# Patient Record
Sex: FEMALE | Race: WHITE | NOT HISPANIC OR LATINO | URBAN - METROPOLITAN AREA
[De-identification: names, ages, dates, MRNs, and addresses within clinical notes are randomized per-mention and may not be internally consistent; named-entity substitution may affect disease eponyms.]

---

## 2017-01-27 ENCOUNTER — OUTPATIENT (OUTPATIENT)
Dept: OUTPATIENT SERVICES | Facility: HOSPITAL | Age: 77
LOS: 1 days | Discharge: HOME | End: 2017-01-27

## 2017-03-06 ENCOUNTER — APPOINTMENT (OUTPATIENT)
Dept: HEMATOLOGY ONCOLOGY | Facility: CLINIC | Age: 77
End: 2017-03-06

## 2017-03-06 ENCOUNTER — OUTPATIENT (OUTPATIENT)
Dept: OUTPATIENT SERVICES | Facility: HOSPITAL | Age: 77
LOS: 1 days | Discharge: HOME | End: 2017-03-06

## 2017-03-06 VITALS
WEIGHT: 138 LBS | RESPIRATION RATE: 12 BRPM | HEART RATE: 59 BPM | TEMPERATURE: 98.3 F | SYSTOLIC BLOOD PRESSURE: 139 MMHG | DIASTOLIC BLOOD PRESSURE: 64 MMHG

## 2017-03-06 DIAGNOSIS — E04.2 NONTOXIC MULTINODULAR GOITER: ICD-10-CM

## 2017-03-06 DIAGNOSIS — Z87.891 PERSONAL HISTORY OF NICOTINE DEPENDENCE: ICD-10-CM

## 2017-03-06 DIAGNOSIS — Z87.39 PERSONAL HISTORY OF OTHER DISEASES OF THE MUSCULOSKELETAL SYSTEM AND CONNECTIVE TISSUE: ICD-10-CM

## 2017-03-06 LAB
BASOPHILS # BLD: 0.04 TH/MM3
BASOPHILS NFR BLD: 0.8 %
EOSINOPHIL # BLD: 0.17 TH/MM3
EOSINOPHIL NFR BLD: 3.6 %
ERYTHROCYTE [DISTWIDTH] IN BLOOD BY AUTOMATED COUNT: 13.4 %
GRANULOCYTES # BLD: 2.62 TH/MM3
GRANULOCYTES NFR BLD: 54.8 %
HCT VFR BLD AUTO: 34.8 %
HGB BLD-MCNC: 12 G/DL
IMM GRANULOCYTES # BLD: 0.04 TH/MM3
IMM GRANULOCYTES NFR BLD: 0.8 %
LYMPHOCYTES # BLD: 1.42 TH/MM3
LYMPHOCYTES NFR BLD: 29.7 %
MCH RBC QN AUTO: 31.6 PG
MCHC RBC AUTO-ENTMCNC: 34.5 G/DL
MCV RBC AUTO: 91.6 FL
MONOCYTES # BLD: 0.49 TH/MM3
MONOCYTES NFR BLD: 10.3 %
PLATELET # BLD: 195 TH/MM3
PMV BLD AUTO: 9.7 FL
RBC # BLD AUTO: 3.8 MIL/MM3
WBC # BLD: 4.78 TH/MM3

## 2017-06-27 DIAGNOSIS — D64.9 ANEMIA, UNSPECIFIED: ICD-10-CM

## 2017-06-27 DIAGNOSIS — D72.819 DECREASED WHITE BLOOD CELL COUNT, UNSPECIFIED: ICD-10-CM

## 2017-06-28 DIAGNOSIS — D48.5 NEOPLASM OF UNCERTAIN BEHAVIOR OF SKIN: ICD-10-CM

## 2017-10-03 ENCOUNTER — OUTPATIENT (OUTPATIENT)
Dept: OUTPATIENT SERVICES | Facility: HOSPITAL | Age: 77
LOS: 1 days | Discharge: HOME | End: 2017-10-03

## 2017-10-03 DIAGNOSIS — J84.10 PULMONARY FIBROSIS, UNSPECIFIED: ICD-10-CM

## 2017-10-03 DIAGNOSIS — R06.00 DYSPNEA, UNSPECIFIED: ICD-10-CM

## 2017-10-03 DIAGNOSIS — R51 HEADACHE: ICD-10-CM

## 2017-10-03 DIAGNOSIS — J98.4 OTHER DISORDERS OF LUNG: ICD-10-CM

## 2017-10-03 DIAGNOSIS — I48.91 UNSPECIFIED ATRIAL FIBRILLATION: ICD-10-CM

## 2017-11-28 ENCOUNTER — OUTPATIENT (OUTPATIENT)
Dept: OUTPATIENT SERVICES | Facility: HOSPITAL | Age: 77
LOS: 1 days | Discharge: HOME | End: 2017-11-28

## 2017-11-28 DIAGNOSIS — R51 HEADACHE: ICD-10-CM

## 2017-11-28 DIAGNOSIS — I48.91 UNSPECIFIED ATRIAL FIBRILLATION: ICD-10-CM

## 2017-11-30 DIAGNOSIS — Z87.310 PERSONAL HISTORY OF (HEALED) OSTEOPOROSIS FRACTURE: ICD-10-CM

## 2017-11-30 DIAGNOSIS — Z13.820 ENCOUNTER FOR SCREENING FOR OSTEOPOROSIS: ICD-10-CM

## 2017-11-30 DIAGNOSIS — M89.9 DISORDER OF BONE, UNSPECIFIED: ICD-10-CM

## 2017-11-30 DIAGNOSIS — Z82.62 FAMILY HISTORY OF OSTEOPOROSIS: ICD-10-CM

## 2017-11-30 DIAGNOSIS — Z78.0 ASYMPTOMATIC MENOPAUSAL STATE: ICD-10-CM

## 2018-03-12 ENCOUNTER — OUTPATIENT (OUTPATIENT)
Dept: OUTPATIENT SERVICES | Facility: HOSPITAL | Age: 78
LOS: 1 days | Discharge: HOME | End: 2018-03-12

## 2018-03-12 ENCOUNTER — APPOINTMENT (OUTPATIENT)
Dept: HEMATOLOGY ONCOLOGY | Facility: CLINIC | Age: 78
End: 2018-03-12

## 2018-03-12 ENCOUNTER — LABORATORY RESULT (OUTPATIENT)
Age: 78
End: 2018-03-12

## 2018-03-12 VITALS
SYSTOLIC BLOOD PRESSURE: 136 MMHG | WEIGHT: 130 LBS | HEART RATE: 67 BPM | DIASTOLIC BLOOD PRESSURE: 62 MMHG | HEIGHT: 62 IN | BODY MASS INDEX: 23.92 KG/M2 | RESPIRATION RATE: 14 BRPM | TEMPERATURE: 98.3 F

## 2018-03-12 LAB
HCT VFR BLD CALC: 36.2 %
HGB BLD-MCNC: 12.2 G/DL
MCHC RBC-ENTMCNC: 31 PG
MCHC RBC-ENTMCNC: 33.7 G/DL
MCV RBC AUTO: 92.1 FL
PLATELET # BLD AUTO: 196 K/UL
PMV BLD: 10.2 FL
RBC # BLD: 3.93 M/UL
RBC # FLD: 13.2 %
WBC # FLD AUTO: 5.01 K/UL

## 2018-03-12 RX ORDER — RIVAROXABAN 15 MG/1
15 TABLET, FILM COATED ORAL
Refills: 0 | Status: DISCONTINUED | COMMUNITY
End: 2018-03-12

## 2018-03-15 DIAGNOSIS — D72.819 DECREASED WHITE BLOOD CELL COUNT, UNSPECIFIED: ICD-10-CM

## 2018-10-10 ENCOUNTER — OUTPATIENT (OUTPATIENT)
Dept: OUTPATIENT SERVICES | Facility: HOSPITAL | Age: 78
LOS: 1 days | Discharge: HOME | End: 2018-10-10

## 2018-10-10 DIAGNOSIS — R91.8 OTHER NONSPECIFIC ABNORMAL FINDING OF LUNG FIELD: ICD-10-CM

## 2019-01-17 ENCOUNTER — TRANSCRIPTION ENCOUNTER (OUTPATIENT)
Age: 79
End: 2019-01-17

## 2019-01-22 ENCOUNTER — TRANSCRIPTION ENCOUNTER (OUTPATIENT)
Age: 79
End: 2019-01-22

## 2019-01-23 ENCOUNTER — EMERGENCY (EMERGENCY)
Facility: HOSPITAL | Age: 79
LOS: 0 days | Discharge: HOME | End: 2019-01-23
Attending: EMERGENCY MEDICINE

## 2019-01-23 VITALS
OXYGEN SATURATION: 98 % | SYSTOLIC BLOOD PRESSURE: 135 MMHG | RESPIRATION RATE: 18 BRPM | HEART RATE: 80 BPM | TEMPERATURE: 99 F | DIASTOLIC BLOOD PRESSURE: 65 MMHG

## 2019-01-23 VITALS
RESPIRATION RATE: 18 BRPM | DIASTOLIC BLOOD PRESSURE: 77 MMHG | HEART RATE: 80 BPM | OXYGEN SATURATION: 98 % | TEMPERATURE: 96 F | SYSTOLIC BLOOD PRESSURE: 163 MMHG

## 2019-01-23 DIAGNOSIS — Z02.9 ENCOUNTER FOR ADMINISTRATIVE EXAMINATIONS, UNSPECIFIED: ICD-10-CM

## 2019-01-23 LAB
ALBUMIN SERPL ELPH-MCNC: 3.8 G/DL — SIGNIFICANT CHANGE UP (ref 3.5–5.2)
ALP SERPL-CCNC: 81 U/L — SIGNIFICANT CHANGE UP (ref 30–115)
ALT FLD-CCNC: 18 U/L — SIGNIFICANT CHANGE UP (ref 0–41)
APPEARANCE UR: CLEAR — SIGNIFICANT CHANGE UP
AST SERPL-CCNC: 30 U/L — SIGNIFICANT CHANGE UP (ref 0–41)
BASOPHILS # BLD AUTO: 0.05 K/UL — SIGNIFICANT CHANGE UP (ref 0–0.2)
BASOPHILS NFR BLD AUTO: 1 % — SIGNIFICANT CHANGE UP (ref 0–1)
BILIRUB SERPL-MCNC: 0.3 MG/DL — SIGNIFICANT CHANGE UP (ref 0.2–1.2)
BILIRUB UR-MCNC: NEGATIVE — SIGNIFICANT CHANGE UP
BUN SERPL-MCNC: 7 MG/DL — LOW (ref 10–20)
CALCIUM SERPL-MCNC: 9 MG/DL — SIGNIFICANT CHANGE UP (ref 8.5–10.1)
CHLORIDE SERPL-SCNC: 93 MMOL/L — LOW (ref 98–110)
CO2 SERPL-SCNC: 25 MMOL/L — SIGNIFICANT CHANGE UP (ref 17–32)
COLOR SPEC: YELLOW — SIGNIFICANT CHANGE UP
CREAT SERPL-MCNC: 0.5 MG/DL — LOW (ref 0.7–1.5)
DIFF PNL FLD: ABNORMAL
EOSINOPHIL # BLD AUTO: 0.12 K/UL — SIGNIFICANT CHANGE UP (ref 0–0.7)
EOSINOPHIL NFR BLD AUTO: 2.3 % — SIGNIFICANT CHANGE UP (ref 0–8)
GLUCOSE SERPL-MCNC: 94 MG/DL — SIGNIFICANT CHANGE UP (ref 70–99)
GLUCOSE UR QL: NEGATIVE MG/DL — SIGNIFICANT CHANGE UP
HCT VFR BLD CALC: 33.7 % — LOW (ref 37–47)
HGB BLD-MCNC: 11.7 G/DL — LOW (ref 12–16)
IMM GRANULOCYTES NFR BLD AUTO: 0.6 % — HIGH (ref 0.1–0.3)
KETONES UR-MCNC: 15
LEUKOCYTE ESTERASE UR-ACNC: NEGATIVE — SIGNIFICANT CHANGE UP
LYMPHOCYTES # BLD AUTO: 1.25 K/UL — SIGNIFICANT CHANGE UP (ref 1.2–3.4)
LYMPHOCYTES # BLD AUTO: 24.3 % — SIGNIFICANT CHANGE UP (ref 20.5–51.1)
MCHC RBC-ENTMCNC: 31.2 PG — HIGH (ref 27–31)
MCHC RBC-ENTMCNC: 34.7 G/DL — SIGNIFICANT CHANGE UP (ref 32–37)
MCV RBC AUTO: 89.9 FL — SIGNIFICANT CHANGE UP (ref 81–99)
MONOCYTES # BLD AUTO: 0.48 K/UL — SIGNIFICANT CHANGE UP (ref 0.1–0.6)
MONOCYTES NFR BLD AUTO: 9.3 % — SIGNIFICANT CHANGE UP (ref 1.7–9.3)
NEUTROPHILS # BLD AUTO: 3.22 K/UL — SIGNIFICANT CHANGE UP (ref 1.4–6.5)
NEUTROPHILS NFR BLD AUTO: 62.5 % — SIGNIFICANT CHANGE UP (ref 42.2–75.2)
NITRITE UR-MCNC: NEGATIVE — SIGNIFICANT CHANGE UP
NRBC # BLD: 0 /100 WBCS — SIGNIFICANT CHANGE UP (ref 0–0)
PH UR: 7 — SIGNIFICANT CHANGE UP (ref 5–8)
PLATELET # BLD AUTO: 318 K/UL — SIGNIFICANT CHANGE UP (ref 130–400)
POTASSIUM SERPL-MCNC: 5.2 MMOL/L — HIGH (ref 3.5–5)
POTASSIUM SERPL-SCNC: 5.2 MMOL/L — HIGH (ref 3.5–5)
PROT SERPL-MCNC: 7 G/DL — SIGNIFICANT CHANGE UP (ref 6–8)
PROT UR-MCNC: NEGATIVE MG/DL — SIGNIFICANT CHANGE UP
RBC # BLD: 3.75 M/UL — LOW (ref 4.2–5.4)
RBC # FLD: 12.9 % — SIGNIFICANT CHANGE UP (ref 11.5–14.5)
RBC CASTS # UR COMP ASSIST: ABNORMAL /HPF
SODIUM SERPL-SCNC: 134 MMOL/L — LOW (ref 135–146)
SP GR SPEC: 1.01 — SIGNIFICANT CHANGE UP (ref 1.01–1.03)
UROBILINOGEN FLD QL: 0.2 MG/DL — SIGNIFICANT CHANGE UP (ref 0.2–0.2)
WBC # BLD: 5.15 K/UL — SIGNIFICANT CHANGE UP (ref 4.8–10.8)
WBC # FLD AUTO: 5.15 K/UL — SIGNIFICANT CHANGE UP (ref 4.8–10.8)

## 2019-01-23 RX ORDER — METOCLOPRAMIDE HCL 10 MG
10 TABLET ORAL ONCE
Qty: 0 | Refills: 0 | Status: COMPLETED | OUTPATIENT
Start: 2019-01-23 | End: 2019-01-23

## 2019-01-23 RX ORDER — ACETAMINOPHEN WITH CODEINE 300MG-30MG
975 TABLET ORAL ONCE
Qty: 0 | Refills: 0 | Status: DISCONTINUED | OUTPATIENT
Start: 2019-01-23 | End: 2019-01-23

## 2019-01-23 NOTE — ED PROVIDER NOTE - NS ED ROS FT
Review of Systems    Constitutional: (+) fever  Eyes/ENT: (-) change in vision, (-) sore throat, (-) ear pain  Cardiovascular: (-) chest pain, (-) palpitation   Respiratory: (-) cough, (-) shortness of breath  Gastrointestinal: (-) abdominal pain (-) vomiting  Musculoskeletal: (-) neck pain, (-) back pain, (-) joint pain  Integumentary: (-) rash, (-) edema  Neurological: (-) altered mental status  Heme/Lymph: (-) easy bruising (-) easy bleeding

## 2019-01-23 NOTE — ED ADULT NURSE NOTE - OBJECTIVE STATEMENT
PT C/O OF HEAD FULLNESS, COUGH AND TMAX 101. PT A&OX4. PT REUSES PAIN MEDS AND NAUSEA MEDS AT THIS TIME. PT IN NO ACUTE DISTRESS.

## 2019-01-23 NOTE — ED PROVIDER NOTE - OBJECTIVE STATEMENT
79 yo f w a h/o afib, htn p/w headache for 17 d in duration that was proceeded by URI with cough, runny nose and stuffiness, pt was evaluated by PMD and proscribed doxycycline for 10 d for rhinosinusitis currently on 6th d of therapy. Pt reports that headache is mild loclised to the frontotemporal region and non radiating; relieved with Tylenol 975 mg, no positional, with no aggravating factors. Additionally pt is c/o ongoing subjective tactile fevers. The headache is not associated with vision changes, focal deficits, ams, loc, numbness, cough, sob or cp.

## 2019-01-23 NOTE — ED PROVIDER NOTE - PHYSICAL EXAMINATION
Physical Exam    Vital Signs: I have reviewed the initial vital signs.  Constitutional: well-nourished, appears stated age, no acute distress  Eyes: PERRLA, EOM intact, RAPD absent, conjunctiva clear and symmetrical lids.  ENT: neck supple with no adenopathy, moist MM, no rhinorrhea, +ttp over the frontal sinuses b/ L>R. no ant adenopathy or tonsillar erythema or exudates.  Cardiovascular: +S1/S2, no murmurs, regular rate, regular rhythm, well-perfused extremities  Respiratory: unlabored respiratory effort, clear to auscultation bilaterally, speaks in full sentences  Gastrointestinal: soft, non-tender abdomen, no pulsatile mass  Neurologic: awake, alert, cranial nerves II-XII grossly intact, extremities’ motor and sensory functions grossly intact

## 2019-01-23 NOTE — ED PROVIDER NOTE - ATTENDING CONTRIBUTION TO CARE
79 yo f c/o right frontal ha pain "not a headache" per pt.  going on for over  weeks, assoc with fever, congestion.  dx with sinusitis outpt urgent clinicl and given nasal spray and abx. has several days left of the 10 day course. was told today her K was high in the outpt blood work she had. she is denying any cp, sob, paolps, dizzy, n, v.  no uti sx. mild cough.  pt in nad, tender frontal sunus. otherwise nml ent. neck sup, no mening. no brud, kern. ctab, rrr, ab soft, nt ,nd . no lad. no rash. non focal.   imaaging, labs, reassess.

## 2019-01-23 NOTE — ED PROVIDER NOTE - PROGRESS NOTE DETAILS
Pt reports feels much better ready for discharge. Neurologic: awake, alert, cranial nerves II-XII grossly intact, extremities’ motor and sensory functions grossly intact Patient to be discharged from ED. Any available test results were discussed with patient and/or family. Verbal instructions given, including instructions to return to ED immediately for any new, worsening, or concerning symptoms. Patient endorsed understanding. Written discharge instructions additionally given, including follow-up plan.

## 2019-01-23 NOTE — ED PROVIDER NOTE - NSFOLLOWUPINSTRUCTIONS_ED_ALL_ED_FT
Viral Respiratory Infection    A viral respiratory infection is an illness that affects parts of the body used for breathing, like the lungs, nose, and throat. It is caused by a germ called a virus. Symptoms can include runny nose, coughing, sneezing, fatigue, body aches, sore throat, fever, or headache. Over the counter medicine can be used to manage the symptoms but the infection itself goes away on its own.     SEEK IMMEDIATE MEDICAL CARE IF YOU HAVE THE FOLLOWING SYMPTOMS: shortness of breath, chest pain, fever over 10 days, lightheadedness/dizziness.      FOLLOW UP WITH PMD

## 2019-01-25 ENCOUNTER — INPATIENT (INPATIENT)
Facility: HOSPITAL | Age: 79
LOS: 4 days | Discharge: HOME | End: 2019-01-30
Attending: INTERNAL MEDICINE | Admitting: INTERNAL MEDICINE
Payer: MEDICARE

## 2019-01-25 VITALS
DIASTOLIC BLOOD PRESSURE: 68 MMHG | HEIGHT: 62 IN | TEMPERATURE: 99 F | SYSTOLIC BLOOD PRESSURE: 143 MMHG | WEIGHT: 134.92 LBS | RESPIRATION RATE: 18 BRPM | OXYGEN SATURATION: 98 % | HEART RATE: 84 BPM

## 2019-01-25 DIAGNOSIS — R51 HEADACHE: ICD-10-CM

## 2019-01-25 DIAGNOSIS — I48.91 UNSPECIFIED ATRIAL FIBRILLATION: ICD-10-CM

## 2019-01-25 DIAGNOSIS — I10 ESSENTIAL (PRIMARY) HYPERTENSION: ICD-10-CM

## 2019-01-25 DIAGNOSIS — R91.8 OTHER NONSPECIFIC ABNORMAL FINDING OF LUNG FIELD: ICD-10-CM

## 2019-01-25 LAB
ALBUMIN SERPL ELPH-MCNC: 3.8 G/DL — SIGNIFICANT CHANGE UP (ref 3.5–5.2)
ALP SERPL-CCNC: 78 U/L — SIGNIFICANT CHANGE UP (ref 30–115)
ALT FLD-CCNC: 18 U/L — SIGNIFICANT CHANGE UP (ref 0–41)
ANION GAP SERPL CALC-SCNC: 11 MMOL/L — SIGNIFICANT CHANGE UP (ref 7–14)
APPEARANCE UR: CLEAR — SIGNIFICANT CHANGE UP
APTT BLD: 30.5 SEC — SIGNIFICANT CHANGE UP (ref 27–39.2)
AST SERPL-CCNC: 32 U/L — SIGNIFICANT CHANGE UP (ref 0–41)
BACTERIA # UR AUTO: ABNORMAL
BASOPHILS # BLD AUTO: 0.04 K/UL — SIGNIFICANT CHANGE UP (ref 0–0.2)
BASOPHILS NFR BLD AUTO: 0.8 % — SIGNIFICANT CHANGE UP (ref 0–1)
BILIRUB SERPL-MCNC: 0.2 MG/DL — SIGNIFICANT CHANGE UP (ref 0.2–1.2)
BILIRUB UR-MCNC: NEGATIVE — SIGNIFICANT CHANGE UP
BUN SERPL-MCNC: 14 MG/DL — SIGNIFICANT CHANGE UP (ref 10–20)
CALCIUM SERPL-MCNC: 9.4 MG/DL — SIGNIFICANT CHANGE UP (ref 8.5–10.1)
CHLORIDE SERPL-SCNC: 93 MMOL/L — LOW (ref 98–110)
CO2 SERPL-SCNC: 29 MMOL/L — SIGNIFICANT CHANGE UP (ref 17–32)
COD CRY URNS QL: NEGATIVE — SIGNIFICANT CHANGE UP
COLOR SPEC: YELLOW — SIGNIFICANT CHANGE UP
CREAT SERPL-MCNC: 0.6 MG/DL — LOW (ref 0.7–1.5)
DIFF PNL FLD: ABNORMAL
EOSINOPHIL # BLD AUTO: 0.09 K/UL — SIGNIFICANT CHANGE UP (ref 0–0.7)
EOSINOPHIL NFR BLD AUTO: 1.8 % — SIGNIFICANT CHANGE UP (ref 0–8)
EPI CELLS # UR: ABNORMAL /HPF
ERYTHROCYTE [SEDIMENTATION RATE] IN BLOOD: 110 MM/HR — HIGH (ref 0–20)
GLUCOSE SERPL-MCNC: 102 MG/DL — HIGH (ref 70–99)
GLUCOSE UR QL: NEGATIVE MG/DL — SIGNIFICANT CHANGE UP
GRAN CASTS # UR COMP ASSIST: NEGATIVE — SIGNIFICANT CHANGE UP
HCT VFR BLD CALC: 33.6 % — LOW (ref 37–47)
HGB BLD-MCNC: 11.3 G/DL — LOW (ref 12–16)
HYALINE CASTS # UR AUTO: NEGATIVE — SIGNIFICANT CHANGE UP
IMM GRANULOCYTES NFR BLD AUTO: 0.4 % — HIGH (ref 0.1–0.3)
INR BLD: 1.3 RATIO — SIGNIFICANT CHANGE UP (ref 0.65–1.3)
KETONES UR-MCNC: NEGATIVE — SIGNIFICANT CHANGE UP
LACTATE SERPL-SCNC: 0.8 MMOL/L — SIGNIFICANT CHANGE UP (ref 0.5–2.2)
LEUKOCYTE ESTERASE UR-ACNC: NEGATIVE — SIGNIFICANT CHANGE UP
LYMPHOCYTES # BLD AUTO: 1.11 K/UL — LOW (ref 1.2–3.4)
LYMPHOCYTES # BLD AUTO: 22.4 % — SIGNIFICANT CHANGE UP (ref 20.5–51.1)
MCHC RBC-ENTMCNC: 30.1 PG — SIGNIFICANT CHANGE UP (ref 27–31)
MCHC RBC-ENTMCNC: 33.6 G/DL — SIGNIFICANT CHANGE UP (ref 32–37)
MCV RBC AUTO: 89.6 FL — SIGNIFICANT CHANGE UP (ref 81–99)
MONOCYTES # BLD AUTO: 0.59 K/UL — SIGNIFICANT CHANGE UP (ref 0.1–0.6)
MONOCYTES NFR BLD AUTO: 11.9 % — HIGH (ref 1.7–9.3)
NEUTROPHILS # BLD AUTO: 3.11 K/UL — SIGNIFICANT CHANGE UP (ref 1.4–6.5)
NEUTROPHILS NFR BLD AUTO: 62.7 % — SIGNIFICANT CHANGE UP (ref 42.2–75.2)
NITRITE UR-MCNC: NEGATIVE — SIGNIFICANT CHANGE UP
NRBC # BLD: 0 /100 WBCS — SIGNIFICANT CHANGE UP (ref 0–0)
PH UR: 7 — SIGNIFICANT CHANGE UP (ref 5–8)
PLATELET # BLD AUTO: 298 K/UL — SIGNIFICANT CHANGE UP (ref 130–400)
POTASSIUM SERPL-MCNC: 4.7 MMOL/L — SIGNIFICANT CHANGE UP (ref 3.5–5)
POTASSIUM SERPL-SCNC: 4.7 MMOL/L — SIGNIFICANT CHANGE UP (ref 3.5–5)
PROT SERPL-MCNC: 7.4 G/DL — SIGNIFICANT CHANGE UP (ref 6–8)
PROT UR-MCNC: NEGATIVE MG/DL — SIGNIFICANT CHANGE UP
PROTHROM AB SERPL-ACNC: 14 SEC — HIGH (ref 9.95–12.87)
RBC # BLD: 3.75 M/UL — LOW (ref 4.2–5.4)
RBC # FLD: 12.6 % — SIGNIFICANT CHANGE UP (ref 11.5–14.5)
RBC CASTS # UR COMP ASSIST: ABNORMAL /HPF
SODIUM SERPL-SCNC: 133 MMOL/L — LOW (ref 135–146)
SP GR SPEC: 1.01 — SIGNIFICANT CHANGE UP (ref 1.01–1.03)
TRI-PHOS CRY UR QL COMP ASSIST: NEGATIVE — SIGNIFICANT CHANGE UP
URATE CRY FLD QL MICRO: NEGATIVE — SIGNIFICANT CHANGE UP
UROBILINOGEN FLD QL: 0.2 MG/DL — SIGNIFICANT CHANGE UP (ref 0.2–0.2)
WBC # BLD: 4.96 K/UL — SIGNIFICANT CHANGE UP (ref 4.8–10.8)
WBC # FLD AUTO: 4.96 K/UL — SIGNIFICANT CHANGE UP (ref 4.8–10.8)
WBC UR QL: SIGNIFICANT CHANGE UP /HPF

## 2019-01-25 PROCEDURE — 99222 1ST HOSP IP/OBS MODERATE 55: CPT

## 2019-01-25 RX ORDER — ACETAMINOPHEN 500 MG
650 TABLET ORAL EVERY 6 HOURS
Qty: 0 | Refills: 0 | Status: DISCONTINUED | OUTPATIENT
Start: 2019-01-25 | End: 2019-01-29

## 2019-01-25 RX ORDER — SOTALOL HCL 120 MG
80 TABLET ORAL
Qty: 0 | Refills: 0 | Status: DISCONTINUED | OUTPATIENT
Start: 2019-01-25 | End: 2019-01-29

## 2019-01-25 RX ORDER — CHLORHEXIDINE GLUCONATE 213 G/1000ML
1 SOLUTION TOPICAL
Qty: 0 | Refills: 0 | Status: DISCONTINUED | OUTPATIENT
Start: 2019-01-25 | End: 2019-01-29

## 2019-01-25 RX ORDER — SODIUM CHLORIDE 9 MG/ML
1900 INJECTION, SOLUTION INTRAVENOUS ONCE
Qty: 0 | Refills: 0 | Status: COMPLETED | OUTPATIENT
Start: 2019-01-25 | End: 2019-01-25

## 2019-01-25 RX ADMIN — SODIUM CHLORIDE 1900 MILLILITER(S): 9 INJECTION, SOLUTION INTRAVENOUS at 16:01

## 2019-01-25 RX ADMIN — Medication 60 MILLIGRAM(S): at 21:16

## 2019-01-25 NOTE — H&P ADULT - NSHPLABSRESULTS_GEN_ALL_CORE
< from: Xray Chest 2 Views PA/Lat (19 @ 15:52) >      EXAM:  XR CHEST PA LAT 2V            PROCEDURE DATE:  2019              < end of copied text >    < from: Xray Chest 2 Views PA/Lat (19 @ 15:52) >      Impression:      Right upper lobe opacities, stable.. Right upper lobe nodular opacity   better seen on the current examination.       Findings were reported to Milan Everett on 2019 at 4:05 PM with   readback.          < end of copied text >                          11.3   4.96  )-----------( 298      ( 2019 16:00 )             33.6         133<L>  |  93<L>  |  14  ----------------------------<  102<H>  4.7   |  29  |  0.6<L>    Ca    9.4      2019 16:00    TPro  7.4  /  Alb  3.8  /  TBili  0.2  /  DBili  x   /  AST  32  /  ALT  18  /  AlkPhos  78            Urinalysis Basic - ( 2019 16:13 )    Color: Yellow / Appearance: Clear / S.010 / pH: x  Gluc: x / Ketone: Negative  / Bili: Negative / Urobili: 0.2 mg/dL   Blood: x / Protein: Negative mg/dL / Nitrite: Negative   Leuk Esterase: Negative / RBC: 6-10 /HPF / WBC 3-5 /HPF   Sq Epi: x / Non Sq Epi: Occasional /HPF / Bacteria: Few      PT/INR - ( 2019 16:00 )   PT: 14.00 sec;   INR: 1.30 ratio         PTT - ( 2019 16:00 )  PTT:30.5 sec  Lactate Trend   @ 16:00 Lactate:0.8         CAPILLARY BLOOD GLUCOSE < from: Xray Chest 2 Views PA/Lat (19 @ 15:52) >      EXAM:  XR CHEST PA LAT 2V            PROCEDURE DATE:  2019              < end of copied text >    < from: Xray Chest 2 Views PA/Lat (19 @ 15:52) >      Impression:      Right upper lobe opacities, stable.. Right upper lobe nodular opacity   better seen on the current examination.       Findings were reported to Milan Everett on 2019 at 4:05 PM with   readback.          < end of copied text >                          11.3   4.96  )-----------( 298      ( 2019 16:00 )             33.6         133<L>  |  93<L>  |  14  ----------------------------<  102<H>  4.7   |  29  |  0.6<L>    Ca    9.4      2019 16:00    TPro  7.4  /  Alb  3.8  /  TBili  0.2  /  DBili  x   /  AST  32  /  ALT  18  /  AlkPhos  78            Urinalysis Basic - ( 2019 16:13 )    Color: Yellow / Appearance: Clear / S.010 / pH: x  Gluc: x / Ketone: Negative  / Bili: Negative / Urobili: 0.2 mg/dL   Blood: x / Protein: Negative mg/dL / Nitrite: Negative   Leuk Esterase: Negative / RBC: 6-10 /HPF / WBC 3-5 /HPF   Sq Epi: x / Non Sq Epi: Occasional /HPF / Bacteria: Few      PT/INR - ( 2019 16:00 )   PT: 14.00 sec;   INR: 1.30 ratio         PTT - ( 2019 16:00 )  PTT:30.5 sec  Lactate Trend   @ 16:00 Lactate:0.8         CAPILLARY BLOOD GLUCOSE    ESR - 110

## 2019-01-25 NOTE — ED ADULT NURSE NOTE - NSIMPLEMENTINTERV_GEN_ALL_ED
Implemented All Universal Safety Interventions:  [place] to call system. Call bell, personal items and telephone within reach. Instruct patient to call for assistance. Room bathroom lighting operational. Non-slip footwear when patient is off stretcher. Physically safe environment: no spills, clutter or unnecessary equipment. Stretcher in lowest position, wheels locked, appropriate side rails in place.

## 2019-01-25 NOTE — ED ADULT TRIAGE NOTE - CHIEF COMPLAINT QUOTE
pt states she has had a fever for 19 days and is presently being treated for a sinus infection with antibiotics, has been seen in the ED and was called back because ofchest xray results

## 2019-01-25 NOTE — CONSULT NOTE ADULT - PROBLEM SELECTOR RECOMMENDATION 9
f/u with neurology  will f/u patient on the floor to r/o temporal arteritis f/u with neurology  will f/u patient on the floor to r/o temporal arteritis   no acute surgical intervention  case d/w attemding: will try to schedule case at [facility] on Monday or Tuesday  will keep you posted f/u with neurology  will f/u patient on the floor to r/o temporal arteritis     case d/w attemding: will try to schedule case at [facility] on Monday or Tuesday  will keep you posted

## 2019-01-25 NOTE — INPATIENT CERTIFICATION FOR MEDICARE PATIENTS - RISKS OF ADVERSE EVENTS
Concern for worsening infectious process/Concern for delay in diagnosis and treatment/Concern for neurologic deterioration

## 2019-01-25 NOTE — ED PROVIDER NOTE - NS ED ROS FT
Review of Systems    Constitutional: (+) fever  Eyes/ENT: (-) change in vision, (-) sore throat, (-) ear pain  Cardiovascular: (-) chest pain, (-) palpitation   Respiratory: (-) shortness of breath  Gastrointestinal: (-) abdominal pain (-) vomiting  Musculoskeletal: (-) neck pain, (-) back pain, (-) joint pain  Integumentary: (-) rash, (-) edema  Neurological: (-) altered mental status

## 2019-01-25 NOTE — ED PROVIDER NOTE - PROGRESS NOTE DETAILS
case d/w dr. mejía.  dx testing d/w him.  he requests we addd on ESR and he will perform quantiferon test on Monday in the office.  he will facilitate the patient's f/u with dr. clement. Pt discuessed with Dr Pretty Neurology recommends admission for temporal artery biopsy Case discussed with Dr Stone aware of R upper lobe opacity R/o TB and r/o temporal arteritis case d/w dr. mejía.  dx testing d/w him.  he requests we addd on ESR Case discussed with Dr Stone aware of R upper lobe opacity- will need pulmonary consult. Admitted for suspected temporal arteritis per neurology

## 2019-01-25 NOTE — ED PROVIDER NOTE - ATTENDING CONTRIBUTION TO CARE
pt with persisting low grade fever.  no cough.  + right sided facial pain.  sx present for over 1 week.  little or no change with po abx.  + sinus drip.  VS noted.  ttp over temporal artery, but also over frontal and maxillary sinus.  neck supple.  chest clear.  dx testing reviewed from this and previous visit.  opacity seen on CXR is subtle and is not c/w significant bacterial infection.  WBC, VS support notion this is not c/w sepsis.  diff dx d/w pt.  in my opinion, no acute medical or surgical emergency exists at this time and In my opinion, out patient treatment and follow up is appropriate. await ESR. Copies of all diagnostic studies were given to patient to aid in follow up.

## 2019-01-25 NOTE — CONSULT NOTE ADULT - ATTENDING COMMENTS
Left temporal headache with . Neurology requesting TA biopsy. Will plan for 01/29/19. Hold all anticoagulants.

## 2019-01-25 NOTE — ED PROVIDER NOTE - OBJECTIVE STATEMENT
79 yo f pmhx sig for afib and htn, currently being trx for sinusitis was seen in the ED 2 d ago for cough and left sided headache, on the CXR it was found pt had an opacity in the R upper lobe. Additionally pt was seen by PMD told to follow up in the ED to r/o temporal arteritis ESR test. Currently pt is c/o ongoing episodic fevers with T max of 100.1. Additionally pt is reporting a episodic L sided headache that is non radiating not associated with n/v, photophobia; relieved with Tylenol 975, no aggravating factors. Denies vision changes, LOC, CP, cough, SOB, night sweats and weight loss.    I have reviewed available current nursing and previous documentation of past medical, surgical, family, and/or social history.

## 2019-01-25 NOTE — ED PROVIDER NOTE - MEDICAL DECISION MAKING DETAILS
ESR elevated. Dr. Pretty from neurology recommends admission and starting patient on prednisone for possible giant cell arteritis. Patient will also need pulm consult in house for upper lobe opacity. Dr. Stone aware of the above and assumed the care of the patient.

## 2019-01-25 NOTE — H&P ADULT - HISTORY OF PRESENT ILLNESS
[age]yo female, recently started on oral doxycycline for sinusitis but subsequently found to have RUL opacity on CXR is referred to the ER due to episodic fevers and left sided headache in addition to above. Patient tells me she gets night sweats along with stiff neck present only in hte morning. Denies weight loss but she also has a non-productive cough 77yo female, recently started on oral doxycycline for sinusitis but subsequently found to have RUL opacity on CXR is referred to the ER due to episodic fevers and left sided headache in addition to above. Patient tells me she gets night sweats along with stiff neck present only in hte morning. Denies weight loss but she also has a non-productive cough. Fevers are controlled with Tylenol 77yo female, recently started on oral doxycycline for sinusitis but subsequently found to have RUL opacity on CXR is referred to the ER due to episodic fevers up to 101.1 for 19 days,  and left sided headache in addition to above. Patient tells me she gets night sweats along with stiff neck present only in the morning. Denies weight loss but she also has a non-productive cough. Fevers are controlled with Tylenol

## 2019-01-25 NOTE — ED PROVIDER NOTE - PHYSICAL EXAMINATION
Physical Exam    Vital Signs: I have reviewed the initial vital signs.  Constitutional: well-nourished, appears stated age, no acute distress  Eyes: PERRLA, and symmetrical lids.  ENT: Neck supple with no adenopathy, moist MM. +TTP over the fronto L temporal region and temporal artery.   Cardiovascular: regular rate, regular rhythm, well-perfused extremities  Respiratory: unlabored respiratory effort, clear to auscultation bilaterally  Gastrointestinal: soft, non-tender abdomen  Musculoskeletal: supple neck, no lower extremity edema, no neck stiffness  Integumentary: warm, dry, no rash  Neurologic: awake, alert, cranial nerves II-XII grossly intact, extremities’ motor and sensory functions grossly intact  Psychiatric: A&Ox3

## 2019-01-25 NOTE — H&P ADULT - PROBLEM SELECTOR PLAN 1
Concern for TA in view of elevated ESR (though she also has fevers). Continue prednisone started in the ER with consults to neurology and vascular surgery (WILL HOLD XARELTO PENDING ABOVE INPUT IN CASE BIOPSY IS NEEDED)

## 2019-01-25 NOTE — CONSULT NOTE ADULT - SUBJECTIVE AND OBJECTIVE BOX
· asked to evaluate This patient is a 78y Female complaining of medical evaluation.	  · HPI Objective Statement: 77 yo f pmhx sig for afib and htn, currently being trx for sinusitis was seen in the ED 2 d ago for cough and left sided headache, on the CXR it was found pt had an opacity in the R upper lobe. Additionally pt was seen by PMD told to follow up in the ED to r/o temporal arteritis ESR test. Currently pt is c/o ongoing episodic fevers with T max of 100.1. Additionally pt is reporting a episodic L sided headache that is non radiating not associated with n/v, photophobia; relieved with Tylenol 975, no aggravating factors. Denies vision changes, LOC, CP, cough, SOB, night sweats and weight loss.    This is a 78y patient admitted for  neurologic temporal pain on left side   called to evaluate pt     Pmhx: afib, htn Psurghx Allergy: amoxicillin (Unknown) aspirin (Other (Severe)) cephalexin (Unknown) Cipro (Unknown) naproxen (Unknown) penicillin V potassium (Unknown) sulfonamides (Unknown)  Meds: chlorhexidine 4% Liquid 1 Application(s) Topical <User Schedule>  SH:    Vital Signs Last 24 Hrs T(C): 37.4 (25 Jan 2019 20:07), Max: 37.4 (25 Jan 2019 20:07) T(F): 99.4 (25 Jan 2019 20:07), Max: 99.4 (25 Jan 2019 20:07) HR: 84 (25 Jan 2019 15:16) (84 - 84) BP: 143/68 (25 Jan 2019 15:16) (143/68 - 143/68) BP(mean): -- RR: 18 (25 Jan 2019 20:07) (18 - 18) SpO2: 98% (25 Jan 2019 15:16) (98% - 98%)  REVIEW OF SYSTEMS:  CONSTITUTIONAL: No weakness, fevers or chills EYES/ENT: No visual changes;  No vertigo or throat pain  NECK: No pain or stiffness RESPIRATORY: No cough, wheezing, hemoptysis; No shortness of breath CARDIOVASCULAR: No chest pain or palpitations GASTROINTESTINAL: No abdominal or epigastric pain. No nausea, vomiting, or hematemesis; No diarrhea or constipation. No melena or hematochezia. GENITOURINARY: No dysuria, frequency or hematuria NEUROLOGICAL: No numbness or weakness but left temporal pain non radiating x2 weeks  SKIN: No itching, rashes  PE: GEN: pt in no acute distress CV: [id number] Lungs: + air entry bilat abd: soft no rebound tendernesss  no guarding ext: no calf tenderness neuro: alert and oriented no focal deficit                         11.3  4.96  )-----------( 298      ( 25 Jan 2019 16:00 )            33.6    A/P : r/o temoral arteritis temporal artery BX

## 2019-01-25 NOTE — CHART NOTE - NSCHARTNOTEFT_GEN_A_CORE
I spoke to ER attending regarding patient who has 19 days of headache with temporal tenderness and ESR = 110.  I recommended starting patient on steroids (would recommend prednisone 60 mg/day plus GI prophylaxis) and consulting  vascular surgery for temporal artery biopsy.  She would need to be done through heparin window probably given her need for  anticoagulation. I spoke to ER attending regarding patient who has 19 days of headache with temporal tenderness and ESR = 110.  I recommended starting patient on steroids (would recommend prednisone 60 mg/day plus GI prophylaxis) and consulting  vascular surgery for temporal artery biopsy.  She would need to be done through heparin window probably given her need for  anticoagulation.  The biopsy should be schedule preferably within one week of the steroids. I spoke to ER attending regarding patient who has 19 days of headache with temporal tenderness and ESR = 110.  I recommended starting patient on steroids (would recommend prednisone 60 mg/day plus GI prophylaxis) and consulting  vascular surgery for temporal artery biopsy.  She would need to be done through heparin window probably given her need for  anticoagulation.  The biopsy should be schedule preferably within one week of the steroids.  Bilateral temporal artery biopsies with  reasonably long piece of artery on each side (at least 20 mm) since the yield is close to 3 x's greater than specimens < 20 mm.  Bilateral biopsy increases the yield as well since giant cell arteritis is a patchy disease.

## 2019-01-26 LAB
ALBUMIN SERPL ELPH-MCNC: 3.4 G/DL — LOW (ref 3.5–5.2)
ALP SERPL-CCNC: 71 U/L — SIGNIFICANT CHANGE UP (ref 30–115)
ALT FLD-CCNC: 14 U/L — SIGNIFICANT CHANGE UP (ref 0–41)
ANION GAP SERPL CALC-SCNC: 8 MMOL/L — SIGNIFICANT CHANGE UP (ref 7–14)
AST SERPL-CCNC: 13 U/L — SIGNIFICANT CHANGE UP (ref 0–41)
BILIRUB SERPL-MCNC: <0.2 MG/DL — SIGNIFICANT CHANGE UP (ref 0.2–1.2)
BUN SERPL-MCNC: 12 MG/DL — SIGNIFICANT CHANGE UP (ref 10–20)
CALCIUM SERPL-MCNC: 8.8 MG/DL — SIGNIFICANT CHANGE UP (ref 8.5–10.1)
CHLORIDE SERPL-SCNC: 98 MMOL/L — SIGNIFICANT CHANGE UP (ref 98–110)
CO2 SERPL-SCNC: 32 MMOL/L — SIGNIFICANT CHANGE UP (ref 17–32)
CREAT SERPL-MCNC: <0.5 MG/DL — LOW (ref 0.7–1.5)
CULTURE RESULTS: NO GROWTH — SIGNIFICANT CHANGE UP
GLUCOSE SERPL-MCNC: 132 MG/DL — HIGH (ref 70–99)
HCT VFR BLD CALC: 31.2 % — LOW (ref 37–47)
HGB BLD-MCNC: 10.4 G/DL — LOW (ref 12–16)
MCHC RBC-ENTMCNC: 30.1 PG — SIGNIFICANT CHANGE UP (ref 27–31)
MCHC RBC-ENTMCNC: 33.3 G/DL — SIGNIFICANT CHANGE UP (ref 32–37)
MCV RBC AUTO: 90.4 FL — SIGNIFICANT CHANGE UP (ref 81–99)
NRBC # BLD: 0 /100 WBCS — SIGNIFICANT CHANGE UP (ref 0–0)
PLATELET # BLD AUTO: 285 K/UL — SIGNIFICANT CHANGE UP (ref 130–400)
POTASSIUM SERPL-MCNC: 3.8 MMOL/L — SIGNIFICANT CHANGE UP (ref 3.5–5)
POTASSIUM SERPL-SCNC: 3.8 MMOL/L — SIGNIFICANT CHANGE UP (ref 3.5–5)
PROT SERPL-MCNC: 6.1 G/DL — SIGNIFICANT CHANGE UP (ref 6–8)
RBC # BLD: 3.45 M/UL — LOW (ref 4.2–5.4)
RBC # FLD: 12.9 % — SIGNIFICANT CHANGE UP (ref 11.5–14.5)
SODIUM SERPL-SCNC: 138 MMOL/L — SIGNIFICANT CHANGE UP (ref 135–146)
SPECIMEN SOURCE: SIGNIFICANT CHANGE UP
WBC # BLD: 4.33 K/UL — LOW (ref 4.8–10.8)
WBC # FLD AUTO: 4.33 K/UL — LOW (ref 4.8–10.8)

## 2019-01-26 RX ORDER — SODIUM CHLORIDE 9 MG/ML
3 INJECTION INTRAMUSCULAR; INTRAVENOUS; SUBCUTANEOUS DAILY
Qty: 0 | Refills: 0 | Status: DISCONTINUED | OUTPATIENT
Start: 2019-01-26 | End: 2019-01-29

## 2019-01-26 RX ORDER — HEPARIN SODIUM 5000 [USP'U]/ML
5000 INJECTION INTRAVENOUS; SUBCUTANEOUS EVERY 8 HOURS
Qty: 0 | Refills: 0 | Status: COMPLETED | OUTPATIENT
Start: 2019-01-26 | End: 2019-01-27

## 2019-01-26 RX ADMIN — HEPARIN SODIUM 5000 UNIT(S): 5000 INJECTION INTRAVENOUS; SUBCUTANEOUS at 21:02

## 2019-01-26 RX ADMIN — Medication 100 MILLIGRAM(S): at 09:30

## 2019-01-26 RX ADMIN — Medication 80 MILLIGRAM(S): at 09:30

## 2019-01-26 RX ADMIN — Medication 100 MILLIGRAM(S): at 21:02

## 2019-01-26 RX ADMIN — Medication 80 MILLIGRAM(S): at 21:03

## 2019-01-26 NOTE — CONSULT NOTE ADULT - SUBJECTIVE AND OBJECTIVE BOX
Neurology Consult    Patient is a 78y old  Female who presents with a chief complaint of left temporal pain x 2 weeks (2019 21:18)      HPI:  77yo female, recently started on oral doxycycline for sinusitis but subsequently found to have RUL opacity on CXR is referred to the ER due to episodic fevers up to 101.1 for 19 days,  and left sided headache in addition to above. Patient tells me she gets night sweats along with stiff neck present only in the morning. Denies weight loss but she also has a non-productive cough. Fevers are controlled with Tylenol (2019 20:47)    ***  Patient reports 19 days of left sided headache and temporal pain and  low grade fevers and night sweats.  She presented to ER last night and ESR was > 100.  Neurologic consult requested to assess for temporal arteritis.  She denies any symptoms on right side.  She reports being given steroids last night and that she feels   significantly better today and she doesn't want to take the steroids any more.  She denies any vision changes.  She denies any significant prior headache history.  She is currently in isolation room until quantiferon gold test back negative for TB.  She reports feeling dizzy for a few days prior to developing the head pain.    PAST MEDICAL & SURGICAL HISTORY:  HTN (hypertension)  Atrial fibrillation      FAMILY HISTORY:      Social History: (-) x 3    Allergies    amoxicillin (Unknown)  aspirin (Other (Severe))  cephalexin (Unknown)  Cipro (Unknown)  naproxen (Unknown)  penicillin V potassium (Unknown)  sulfonamides (Unknown)    Intolerances        MEDICATIONS  (STANDING):  chlorhexidine 4% Liquid 1 Application(s) Topical <User Schedule>  doxycycline hyclate Capsule 100 milliGRAM(s) Oral every 12 hours  predniSONE   Tablet 60 milliGRAM(s) Oral daily  sotalol 80 milliGRAM(s) Oral two times a day    MEDICATIONS  (PRN):  acetaminophen   Tablet .. 650 milliGRAM(s) Oral every 6 hours PRN Temp greater or equal to 38C (100.4F)      Review of systems:    Constitutional: No fever, weight loss or fatigue    Eyes: No eye pain or discharge  ENMT:  No difficulty hearing; No sinus or throat pain  Neck: No pain or stiffness  Respiratory: No cough, wheezing, chills or hemoptysis  Cardiovascular: No chest pain, palpitations, shortness of breath, dyspnea on exertion  Gastrointestinal: No abdominal pain, nausea, vomiting or hematemesis; No diarrhea or constipation.   Genitourinary: No dysuria, frequency, hematuria or incontinence  Neurological: As per HPI  Skin: No rashes or lesions   Endocrine: No heat or cold intolerance; No hair loss  Musculoskeletal: No joint pain or swelling  Psychiatric: No depression, anxiety, mood swings  Heme/Lymph: No easy bruising or bleeding gums    Vital Signs Last 24 Hrs  T(C): 35.8 (2019 05:30), Max: 37.4 (2019 20:07)  T(F): 96.5 (2019 05:30), Max: 99.4 (2019 20:07)  HR: 62 (2019 05:30) (62 - 84)  BP: 139/61 (2019 05:30) (139/61 - 169/70)  BP(mean): --  RR: 16 (2019 05:30) (16 - 18)  SpO2: 98% (2019 15:16) (98% - 98%)    Neurologic Examination:  General:  Appearance is consistent with chronologic age.   General: The patient is oriented to person, place, time and date.  Recent and remote memory intact.  Fund of knowledge is intact and normal.  Language with normal repetition, comprehension and naming.  Nondysarthric.    Cranial nerves: intact VA, VFF.  EOMI w/o nystagmus, skew or reported double vision.  PERRL.  No ptosis/weakness of eyelid closure.  Facial sensation is normal with normal bite.  No facial asymmetry.  Hearing grossly intact b/l.  Palate elevates midline.  Tongue midline.  + TTP mildly over left temporal artery.  right temporal artery is not tender.  Full ROM of neck.  Motor examination:   Normal tone, bulk and range of motion.  No tenderness, twitching, tremors or involuntary movements.  Formal Muscle Strength Testing: (MRC grade R/L) 5/5 UE; 5/5 LE.  No observable drift.  Sensory examination:   Intact to light touch and pain in all extremities.  Cerebellum:   FTN/HKS intact with normal CHARLES in all limbs.  No dysmetria or dysdiadokinesia.  Gait narrow based and normal.  Some tenderness and sensitivity in left face V1,2 distributions but more than that - extends back over nearly entire left scalp.  No sensory loss.    Labs:   CBC Full  -  ( 2019 07:54 )  WBC Count : 4.33 K/uL  Hemoglobin : 10.4 g/dL  Hematocrit : 31.2 %  Platelet Count - Automated : 285 K/uL  Mean Cell Volume : 90.4 fL  Mean Cell Hemoglobin : 30.1 pg  Mean Cell Hemoglobin Concentration : 33.3 g/dL  Auto Neutrophil # : x  Auto Lymphocyte # : x  Auto Monocyte # : x  Auto Eosinophil # : x  Auto Basophil # : x  Auto Neutrophil % : x  Auto Lymphocyte % : x  Auto Monocyte % : x  Auto Eosinophil % : x  Auto Basophil % : x        138  |  98  |  12  ----------------------------<  132<H>  3.8   |  32  |  <0.5<L>    Ca    8.8      2019 07:54    TPro  6.1  /  Alb  3.4<L>  /  TBili  <0.2  /  DBili  x   /  AST  13  /  ALT  14  /  AlkPhos  71      LIVER FUNCTIONS - ( 2019 07:54 )  Alb: 3.4 g/dL / Pro: 6.1 g/dL / ALK PHOS: 71 U/L / ALT: 14 U/L / AST: 13 U/L / GGT: x           PT/INR - ( 2019 16:00 )   PT: 14.00 sec;   INR: 1.30 ratio         PTT - ( 2019 16:00 )  PTT:30.5 sec  Urinalysis Basic - ( 2019 16:13 )    Color: Yellow / Appearance: Clear / S.010 / pH: x  Gluc: x / Ketone: Negative  / Bili: Negative / Urobili: 0.2 mg/dL   Blood: x / Protein: Negative mg/dL / Nitrite: Negative   Leuk Esterase: Negative / RBC: 6-10 /HPF / WBC 3-5 /HPF   Sq Epi: x / Non Sq Epi: Occasional /HPF / Bacteria: Few      < from: CT Head No Cont (19 @ 18:43) >    No CT evidence of acute intracranial pathology.    < end of copied text >      Assessment:  This is a 78y Female with h/o 19 days of left face and head sensitivity now with elevated ESR and temporal tenderness.  Considerations include temporal arteritis and she was experiencing other symptoms  which could be present in setting of Giant Cell Arteritis such as low grade fevers, sweats, general malaise.    Plan:   1.  Quantiferon gold to r/o TB.  2.  Steroids if above negative at 60 mg/day with GI prophylaxis.  3.  Vascular consult for bilateral temporal artery biopsies >= 20 mm segments to increase yield.  This should be performed  within a week of steroids beginning.    19 @ 13:24 Neurology Consult    Patient is a 78y old  Female who presents with a chief complaint of left temporal pain x 2 weeks (2019 21:18)      HPI:  79yo female, recently started on oral doxycycline for sinusitis but subsequently found to have RUL opacity on CXR is referred to the ER due to episodic fevers up to 101.1 for 19 days,  and left sided headache in addition to above. Patient tells me she gets night sweats along with stiff neck present only in the morning. Denies weight loss but she also has a non-productive cough. Fevers are controlled with Tylenol (2019 20:47)    ***  Patient reports 19 days of left sided headache and temporal pain and  low grade fevers and night sweats.  She presented to ER last night and ESR was > 100.  Neurologic consult requested to assess for temporal arteritis.  She denies any symptoms on right side.  She reports being given steroids last night and that she feels   significantly better today and she doesn't want to take the steroids any more.  She denies any vision changes.  She denies any significant prior headache history.  She is currently in isolation room until quantiferon gold test back negative for TB.  She reports feeling dizzy for a few days prior to developing the head pain.    PAST MEDICAL & SURGICAL HISTORY:  HTN (hypertension)  Atrial fibrillation      FAMILY HISTORY:      Social History: (-) x 3    Allergies    amoxicillin (Unknown)  aspirin (Other (Severe))  cephalexin (Unknown)  Cipro (Unknown)  naproxen (Unknown)  penicillin V potassium (Unknown)  sulfonamides (Unknown)    Intolerances        MEDICATIONS  (STANDING):  chlorhexidine 4% Liquid 1 Application(s) Topical <User Schedule>  doxycycline hyclate Capsule 100 milliGRAM(s) Oral every 12 hours  predniSONE   Tablet 60 milliGRAM(s) Oral daily  sotalol 80 milliGRAM(s) Oral two times a day    MEDICATIONS  (PRN):  acetaminophen   Tablet .. 650 milliGRAM(s) Oral every 6 hours PRN Temp greater or equal to 38C (100.4F)      Review of systems:    Constitutional: No fever, weight loss or fatigue    Eyes: No eye pain or discharge  ENMT:  No difficulty hearing; No sinus or throat pain  Neck: No pain or stiffness  Respiratory: No cough, wheezing, chills or hemoptysis  Cardiovascular: No chest pain, palpitations, shortness of breath, dyspnea on exertion  Gastrointestinal: No abdominal pain, nausea, vomiting or hematemesis; No diarrhea or constipation.   Genitourinary: No dysuria, frequency, hematuria or incontinence  Neurological: As per HPI  Skin: No rashes or lesions   Endocrine: No heat or cold intolerance; No hair loss  Musculoskeletal: No joint pain or swelling  Psychiatric: No depression, anxiety, mood swings  Heme/Lymph: No easy bruising or bleeding gums    Vital Signs Last 24 Hrs  T(C): 35.8 (2019 05:30), Max: 37.4 (2019 20:07)  T(F): 96.5 (2019 05:30), Max: 99.4 (2019 20:07)  HR: 62 (2019 05:30) (62 - 84)  BP: 139/61 (2019 05:30) (139/61 - 169/70)  BP(mean): --  RR: 16 (2019 05:30) (16 - 18)  SpO2: 98% (2019 15:16) (98% - 98%)    Neurologic Examination:  General:  Appearance is consistent with chronologic age.   General: The patient is oriented to person, place, time and date.  Recent and remote memory intact.  Fund of knowledge is intact and normal.  Language with normal repetition, comprehension and naming.  Nondysarthric.    Cranial nerves: intact VA, VFF.  EOMI w/o nystagmus, skew or reported double vision.  PERRL.  No ptosis/weakness of eyelid closure.  Facial sensation is normal with normal bite.  No facial asymmetry.  Hearing grossly intact b/l.  Palate elevates midline.  Tongue midline.  + TTP mildly over left temporal artery.  right temporal artery is not tender.  Full ROM of neck.  Motor examination:   Normal tone, bulk and range of motion.  No tenderness, twitching, tremors or involuntary movements.  Formal Muscle Strength Testing: (MRC grade R/L) 5/5 UE; 5/5 LE.  No observable drift.  Sensory examination:   Intact to light touch and pain in all extremities.  Cerebellum:   FTN/HKS intact with normal CHARLES in all limbs.  No dysmetria or dysdiadokinesia.  Gait narrow based and normal.  Some tenderness and sensitivity in left face V1,2 distributions but more than that - extends back over nearly entire left scalp.  No sensory loss.    Labs:   CBC Full  -  ( 2019 07:54 )  WBC Count : 4.33 K/uL  Hemoglobin : 10.4 g/dL  Hematocrit : 31.2 %  Platelet Count - Automated : 285 K/uL  Mean Cell Volume : 90.4 fL  Mean Cell Hemoglobin : 30.1 pg  Mean Cell Hemoglobin Concentration : 33.3 g/dL  Auto Neutrophil # : x  Auto Lymphocyte # : x  Auto Monocyte # : x  Auto Eosinophil # : x  Auto Basophil # : x  Auto Neutrophil % : x  Auto Lymphocyte % : x  Auto Monocyte % : x  Auto Eosinophil % : x  Auto Basophil % : x        138  |  98  |  12  ----------------------------<  132<H>  3.8   |  32  |  <0.5<L>    Ca    8.8      2019 07:54    TPro  6.1  /  Alb  3.4<L>  /  TBili  <0.2  /  DBili  x   /  AST  13  /  ALT  14  /  AlkPhos  71      LIVER FUNCTIONS - ( 2019 07:54 )  Alb: 3.4 g/dL / Pro: 6.1 g/dL / ALK PHOS: 71 U/L / ALT: 14 U/L / AST: 13 U/L / GGT: x           PT/INR - ( 2019 16:00 )   PT: 14.00 sec;   INR: 1.30 ratio         PTT - ( 2019 16:00 )  PTT:30.5 sec  Urinalysis Basic - ( 2019 16:13 )    Color: Yellow / Appearance: Clear / S.010 / pH: x  Gluc: x / Ketone: Negative  / Bili: Negative / Urobili: 0.2 mg/dL   Blood: x / Protein: Negative mg/dL / Nitrite: Negative   Leuk Esterase: Negative / RBC: 6-10 /HPF / WBC 3-5 /HPF   Sq Epi: x / Non Sq Epi: Occasional /HPF / Bacteria: Few      < from: CT Head No Cont (19 @ 18:43) >    No CT evidence of acute intracranial pathology.    < end of copied text >      Assessment:  This is a 78y Female with h/o 19 days of left face and head sensitivity now with elevated ESR and temporal tenderness.  Considerations include temporal arteritis and she was experiencing other symptoms  which could be present in setting of Giant Cell Arteritis such as low grade fevers, sweats, general malaise.    Plan:   1.  Quantiferon gold to r/o TB.  2.  Steroids if above negative at 60 mg/day with GI prophylaxis.  3.  Vascular consult for bilateral temporal artery biopsies >= 20 mm segments to increase yield.  This should be performed  within a week of steroids beginning.  4.  I discussed with patient about the condition of temporal arteritis and how it can lead to permanent blindness if left untreated.  I explained that steroids were integral in first line of therapy for this and she was expressing fear about being   on the steroids.  I explained how biopsy should be arranged within a week of starting the steroids to have best chance of detecting it.  She verbalized understanding the condition that she may have can cause permanent blindness if not  treated.      19 @ 13:24

## 2019-01-26 NOTE — CONSULT NOTE ADULT - ASSESSMENT
· asked to evaluate This patient is a 78y Female complaining of medical evaluation.	  · HPI Objective Statement: 79 yo f pmhx sig for afib and htn, currently being trx for sinusitis was seen in the ED 2 d ago for cough and left sided headache, on the CXR it was found pt had an opacity in the R upper lobe. Additionally pt was seen by PMD told to follow up in the ED to r/o temporal arteritis ESR test. Currently pt is c/o ongoing episodic fevers with T max of 100.1. Additionally pt is reporting a episodic L sided headache that is non radiating not associated with n/v, photophobia; relieved with Tylenol 975, no aggravating factors. Denies vision changes, LOC, CP, cough, SOB, night sweats and weight loss.   note pt on xarelto
IMPRESSION:  Although I think MTB low in differential diagnosis would still rule out given the scenario of calcified nodule, night sweats and fevers  Also has pain left temporal area which responded to steroids. R/O temporal erteritis    RECOMMENDATIONS  Quantiferon assay  Induced sputa for AFB  ESR. If elevate consider a temporal artery biopsy  No ABx

## 2019-01-26 NOTE — PROGRESS NOTE ADULT - SUBJECTIVE AND OBJECTIVE BOX
Chief Complaint:  Patient is a 78y old  Female who presents with a chief complaint of left temporal pain x 2 weeks (2019 21:18)      Interval Events:     Allergies:  amoxicillin (Unknown)  aspirin (Other (Severe))  cephalexin (Unknown)  Cipro (Unknown)  naproxen (Unknown)  penicillin V potassium (Unknown)  sulfonamides (Unknown)      Home Medications:    Hospital Medications:  acetaminophen   Tablet .. 650 milliGRAM(s) Oral every 6 hours PRN  chlorhexidine 4% Liquid 1 Application(s) Topical <User Schedule>  doxycycline hyclate Capsule 100 milliGRAM(s) Oral every 12 hours  predniSONE   Tablet 60 milliGRAM(s) Oral daily  sotalol 80 milliGRAM(s) Oral two times a day      PMHX/PSHX:  HTN (hypertension)  Atrial fibrillation      Family history:      ROS:   As mentioned below      PHYSICAL EXAM:   Vital Signs:  Vital Signs Last 24 Hrs  T(C): 35.8 (2019 05:30), Max: 37.4 (2019 20:07)  T(F): 96.5 (2019 05:30), Max: 99.4 (2019 20:07)  HR: 62 (2019 05:30) (62 - 84)  BP: 139/61 (2019 05:30) (139/61 - 169/70)  BP(mean): --  RR: 16 (2019 05:30) (16 - 18)  SpO2: 98% (2019 15:16) (98% - 98%)  Daily Height in cm: 157.48 (2019 22:29)    Daily     GENERAL:  NAD  HEENT:  NC/AT,  No Thyromegaly  CHEST:  CTA B/L  HEART:  S1, S2- No M, R, G  ABDOMEN:  Soft, NT, ND  EXTEREMITIES:  no cyanosis,clubbing or edema  SKIN:  NAD  NEURO:  Grossly Nr.    LABS:                        10.4   4.33  )-----------( 285      ( 2019 07:54 )             31.2         138  |  98  |  12  ----------------------------<  132<H>  3.8   |  32  |  <0.5<L>    Ca    8.8      2019 07:54    TPro  6.1  /  Alb  3.4<L>  /  TBili  <0.2  /  DBili  x   /  AST  13  /  ALT  14  /  AlkPhos  71      LIVER FUNCTIONS - ( 2019 07:54 )  Alb: 3.4 g/dL / Pro: 6.1 g/dL / ALK PHOS: 71 U/L / ALT: 14 U/L / AST: 13 U/L / GGT: x           PT/INR - ( 2019 16:00 )   PT: 14.00 sec;   INR: 1.30 ratio         PTT - ( 2019 16:00 )  PTT:30.5 sec  Urinalysis Basic - ( 2019 16:13 )    Color: Yellow / Appearance: Clear / S.010 / pH: x  Gluc: x / Ketone: Negative  / Bili: Negative / Urobili: 0.2 mg/dL   Blood: x / Protein: Negative mg/dL / Nitrite: Negative   Leuk Esterase: Negative / RBC: 6-10 /HPF / WBC 3-5 /HPF   Sq Epi: x / Non Sq Epi: Occasional /HPF / Bacteria: Few          Imaging:

## 2019-01-26 NOTE — CHART NOTE - NSCHARTNOTEFT_GEN_A_CORE
Case d/w Dr. Ansari ([facility]. fellow) earlier today and he asked if neurology evaluated patient and if neurology wants bilateral temporal biopsy this admission or if can be done as an outpatient because Dr. Jones is out of town so he would have to arrange for one of the other vascular surgeons to come to [facility] to perform the temporal biopsies at earliest on Tuesday or Wednesday (Jan. 29 or 30th).       Neurology noted reviewed from today and they recommend:   (Vascular consult for bilateral temporal artery biopsies >= 20 mm segments to increase yield.  This should be performed within a week of steroids beginning).    Patient was started on Prednisone on 1/25/2019 so will need to have the bilateral temporal biopsies this admission.      I returned call to Vascular at [facility] (spectra [phone]) and spoke to Dr. Delgado and informed him of Neurology recommendation of bilateral temporal biopsies within a week of starting steroids on Jan. 25, 2019.  He states he will discuss recommendations with vascular attendings.     Will follow.

## 2019-01-26 NOTE — CONSULT NOTE ADULT - SUBJECTIVE AND OBJECTIVE BOX
ISMAELGRACIELA  78y, Female  Allergy: amoxicillin (Unknown)  aspirin (Other (Severe))  cephalexin (Unknown)  Cipro (Unknown)  naproxen (Unknown)  penicillin V potassium (Unknown)  sulfonamides (Unknown)      HPI:  79yo female, recently started on oral doxycycline for sinusitis but subsequently found to have RUL opacity on CXR is referred to the ER due to episodic fevers up to 101.1 for 19 days,  and left sided headache in addition to above. Patient tells me she gets night sweats along with stiff neck present only in the morning. Denies weight loss but she also has a non-productive cough. Fevers are controlled with Tylenol (2019 20:47)    FAMILY HISTORY:    PAST MEDICAL & SURGICAL HISTORY:  HTN (hypertension)  Atrial fibrillation        VITALS:  T(F): 96.5, Max: 99.4 (19 @ 20:07)  HR: 62  BP: 139/61  RR: 16Vital Signs Last 24 Hrs  T(C): 35.8 (2019 05:30), Max: 37.4 (2019 20:07)  T(F): 96.5 (2019 05:30), Max: 99.4 (2019 20:07)  HR: 62 (2019 05:30) (62 - 84)  BP: 139/61 (2019 05:30) (139/61 - 169/70)  BP(mean): --  RR: 16 (2019 05:30) (16 - 18)  SpO2: 98% (2019 15:16) (98% - 98%)    TESTS & MEASUREMENTS:                        10.4   4.33  )-----------( 285      ( 2019 07:54 )             31.2         138  |  98  |  12  ----------------------------<  132<H>  3.8   |  32  |  <0.5<L>    Ca    8.8      2019 07:54    TPro  6.1  /  Alb  3.4<L>  /  TBili  <0.2  /  DBili  x   /  AST  13  /  ALT  14  /  AlkPhos  71      LIVER FUNCTIONS - ( 2019 07:54 )  Alb: 3.4 g/dL / Pro: 6.1 g/dL / ALK PHOS: 71 U/L / ALT: 14 U/L / AST: 13 U/L / GGT: x             Urinalysis Basic - ( 2019 16:13 )    Color: Yellow / Appearance: Clear / S.010 / pH: x  Gluc: x / Ketone: Negative  / Bili: Negative / Urobili: 0.2 mg/dL   Blood: x / Protein: Negative mg/dL / Nitrite: Negative   Leuk Esterase: Negative / RBC: 6-10 /HPF / WBC 3-5 /HPF   Sq Epi: x / Non Sq Epi: Occasional /HPF / Bacteria: Few          RADIOLOGY & ADDITIONAL TESTS:    ANTIBIOTICS:  doxycycline hyclate Capsule 100 milliGRAM(s) Oral every 12 hours

## 2019-01-26 NOTE — PROGRESS NOTE ADULT - ASSESSMENT
79yo female, recently started on oral doxycycline for sinusitis but subsequently found to have RUL opacity on CXR is referred to the ER due to episodic fevers up to 101.1 for 19 days,  and left sided headache in addition to above.    Problem/Plan - 1:  ·  Problem: Headache.  Plan: Concern for TA in view of elevated ESR Vascular eval- Temoral artery biopsy?     Problem/Plan - 2:  ·  Problem: Hypertension, unspecified type.  Plan: SOTALOL.      Problem/Plan - 3:  ·  Problem: Atrial fibrillation, unspecified type.  Plan: Sotalol, as noted above holding Xarelto pending decision on need for biopsy.      Problem/Plan - 4:  ·  Problem: Opacity of lung on imaging study.  Plan: Quantiferon assay as per infectious disease  Induced sputa for AFB    DVT, GI Prophylaxis    Pager No.  [phone]

## 2019-01-27 RX ORDER — HEPARIN SODIUM 5000 [USP'U]/ML
5000 INJECTION INTRAVENOUS; SUBCUTANEOUS EVERY 8 HOURS
Qty: 0 | Refills: 0 | Status: DISCONTINUED | OUTPATIENT
Start: 2019-01-27 | End: 2019-01-28

## 2019-01-27 RX ADMIN — Medication 80 MILLIGRAM(S): at 21:12

## 2019-01-27 RX ADMIN — Medication 650 MILLIGRAM(S): at 14:37

## 2019-01-27 RX ADMIN — Medication 80 MILLIGRAM(S): at 09:39

## 2019-01-27 RX ADMIN — HEPARIN SODIUM 5000 UNIT(S): 5000 INJECTION INTRAVENOUS; SUBCUTANEOUS at 05:53

## 2019-01-27 RX ADMIN — HEPARIN SODIUM 5000 UNIT(S): 5000 INJECTION INTRAVENOUS; SUBCUTANEOUS at 13:50

## 2019-01-27 RX ADMIN — HEPARIN SODIUM 5000 UNIT(S): 5000 INJECTION INTRAVENOUS; SUBCUTANEOUS at 21:55

## 2019-01-27 RX ADMIN — CHLORHEXIDINE GLUCONATE 1 APPLICATION(S): 213 SOLUTION TOPICAL at 05:32

## 2019-01-27 RX ADMIN — Medication 100 MILLIGRAM(S): at 09:39

## 2019-01-27 RX ADMIN — Medication 650 MILLIGRAM(S): at 14:38

## 2019-01-27 NOTE — CHART NOTE - NSCHARTNOTEFT_GEN_A_CORE
Re-called for Vascular consult today.  Vascular consult was completed on 1/25/19 and follow up note completed yesterday with further vascular recommendations.     Please see below Vascular recommendations:     Case d/w Dr. Ansari ([facility]. fellow) earlier today and he asked if neurology evaluated patient and if neurology wants bilateral temporal biopsy this admission or if can be done as an outpatient because Dr. Jones is out of town so he would have to arrange [place] one [place] the [place] at earliest on Tuesday or Wednesday (Jan. 29 or 30th).       Neurology noted reviewed from today and they recommend:   (Vascular consult for bilateral temporal artery biopsies >= 20 mm segments to increase yield.  This should be performed within a week of steroids beginning).    Patient was started on Prednisone on 1/25/2019 so will need to have the bilateral temporal biopsies this admission.      I spoke to Dr. Delgado (vascular resident) and also spoke with Dr. Ansari and informed him of Neurology recommendation of bilateral temporal biopsies within a week of starting steroids on Jan. 25, 2019.  He states he will discuss recommendations with vascular attendings.       Will follow. Re-called for Vascular consult today.  Vascular consult was completed on 1/25/19 and follow up note completed yesterday with further vascular recommendations.     Please see below Vascular recommendations:     Case d/w Dr. Ansari ([facility]. fellow) earlier today and he asked if neurology evaluated patient and if neurology wants bilateral temporal biopsy this admission or if can be done as an outpatient because Dr. Jones is out of town so he would have to arrange for one of the other vascular [place] the [place] biopsies at earliest on Tuesday or Wednesday (Jan. 29 or 30th).       Neurology noted reviewed from today and they recommend:   (Vascular consult for bilateral temporal artery biopsies >= 20 mm segments to increase yield.  This should be performed within a week of steroids beginning).    Patient was started on Prednisone on 1/25/2019 so will need to have the bilateral temporal biopsies this admission.      I spoke to Dr. Delgado (vascular resident) and also spoke with Dr. Ansari and informed him of Neurology recommendation of bilateral temporal biopsies within a week of starting steroids on Jan. 25, 2019.  He states he will discuss recommendations with vascular attendings.       Will follow.        Attending Attestation:    Will perform bilateral temporal artery biopsy on 01/29/19. D/w house staff

## 2019-01-27 NOTE — PROGRESS NOTE ADULT - SUBJECTIVE AND OBJECTIVE BOX
Chief Complaint:  Patient is a 78y old  Female who presents with a chief complaint of ? temporal arteritis (2019 13:24)      Interval Events:     Allergies:  amoxicillin (Unknown)  aspirin (Other (Severe))  cephalexin (Unknown)  Cipro (Unknown)  naproxen (Unknown)  penicillin V potassium (Unknown)  sulfonamides (Unknown)      Home Medications:    Hospital Medications:  acetaminophen   Tablet .. 650 milliGRAM(s) Oral every 6 hours PRN  chlorhexidine 4% Liquid 1 Application(s) Topical <User Schedule>  doxycycline hyclate Capsule 100 milliGRAM(s) Oral every 12 hours  heparin  Injectable 5000 Unit(s) SubCutaneous every 8 hours  predniSONE   Tablet 60 milliGRAM(s) Oral daily  sodium chloride 3%  Inhalation 3 milliLiter(s) Inhalation daily  sotalol 80 milliGRAM(s) Oral two times a day      PMHX/PSHX:  HTN (hypertension)  Atrial fibrillation      Family history:      ROS:   As mentioned below      PHYSICAL EXAM:   Vital Signs:  Vital Signs Last 24 Hrs  T(C): 36.3 (2019 05:38), Max: 36.7 (2019 20:48)  T(F): 97.3 (2019 05:38), Max: 98 (2019 20:48)  HR: 66 (2019 05:38) (63 - 80)  BP: 131/59 (2019 05:38) (121/56 - 131/59)  BP(mean): --  RR: 16 (2019 05:38) (16 - 17)  SpO2: --  Daily     Daily     GENERAL:  NAD  HEENT:  NC/AT,  No Thyromegaly  CHEST:  CTA B/L  HEART:  S1, S2- No M, R, G  ABDOMEN:  Soft, NT, ND  EXTEREMITIES:  no cyanosis,clubbing or edema  SKIN:  NAD  NEURO:  Grossly Nr.    LABS:                        10.4   4.33  )-----------( 285      ( 2019 07:54 )             31.2         138  |  98  |  12  ----------------------------<  132<H>  3.8   |  32  |  <0.5<L>    Ca    8.8      2019 07:54    TPro  6.1  /  Alb  3.4<L>  /  TBili  <0.2  /  DBili  x   /  AST  13  /  ALT  14  /  AlkPhos  71      LIVER FUNCTIONS - ( 2019 07:54 )  Alb: 3.4 g/dL / Pro: 6.1 g/dL / ALK PHOS: 71 U/L / ALT: 14 U/L / AST: 13 U/L / GGT: x           PT/INR - ( 2019 16:00 )   PT: 14.00 sec;   INR: 1.30 ratio         PTT - ( 2019 16:00 )  PTT:30.5 sec  Urinalysis Basic - ( 2019 16:13 )    Color: Yellow / Appearance: Clear / S.010 / pH: x  Gluc: x / Ketone: Negative  / Bili: Negative / Urobili: 0.2 mg/dL   Blood: x / Protein: Negative mg/dL / Nitrite: Negative   Leuk Esterase: Negative / RBC: 6-10 /HPF / WBC 3-5 /HPF   Sq Epi: x / Non Sq Epi: Occasional /HPF / Bacteria: Few          Imaging:

## 2019-01-27 NOTE — PROGRESS NOTE ADULT - ASSESSMENT
77yo female, recently started on oral doxycycline for sinusitis but subsequently found to have RUL opacity on CXR is referred to the ER due to episodic fevers up to 101.1 for 19 days,  and left sided headache in addition to above.    Problem/Plan - 1:  ·  Problem: Headache.  Plan: Concern for TA in view of elevated ESR Vascular eval- Temoral artery biopsy?   Reviewed Neurology recommendations and Input- called vascular eval for Temopral ar biopsy- Xarelto to be held - heparin Sq- Also will hold Prednsione till ro TB   Problem/Plan - 2:  ·  Problem: Hypertension, unspecified type.  Plan: SOTALOL.    Problem/Plan - 3:  ·  Problem: Atrial fibrillation, unspecified type.  Plan: Sotalol, as noted above holding Xarelto pending decision on need for biopsy.    Problem/Plan - 4:  ·  Problem: Opacity of lung on imaging study.  Plan: Quantiferon assay as per infectious disease -Previopus CT SCans showed same calcified granuloma  Induced sputa for AFB  DVT, GI Prophylaxis  Dispo: Acute- Patient co again of severe headache today- Call ailyn,- Awaiting Rajendra Arce Prednisone  Pager No.  [phone]

## 2019-01-27 NOTE — CONSULT NOTE ADULT - SUBJECTIVE AND OBJECTIVE BOX
GRACIELA GUEVARA  78y  Female  Patient seen and examined. Chart reviewed and events noted.  HPI:  77yo female, recently started on oral doxycycline for sinusitis but subsequently found to have RUL opacity on CXR is referred to the ER due to episodic fevers up to 101.1 for 19 days,  and left sided headache in addition to above. Patient tells me she gets night sweats along with stiff neck present only in the morning. Denies weight loss but she also has a non-productive cough. Fevers are controlled with Tylenol (2019 20:47)    Important info:  This is OLD FINDING goes back 5 years. See CT CHEST 10/2018.  chronic peribronchial wall thickening and areas of mucoid impaction bilaterally, most prominent in the right upper   lobe. Tree-in-bud nodularity and areas of scarring/bronchiectasis are overall unchanged. Chronic calcified granulomas at the right upper lobe.     The patient has fever and left sided temporal artery pain and tenderness. Has had low grade fever 99 for 19 days but once started on Minocycline her temps barry to 101. This is not suggestive of MTB. NO history of TB or exposure.     PAST MEDICAL & SURGICAL HISTORY:  HTN (hypertension)  Atrial fibrillation    Allergies  amoxicillin (Unknown)  aspirin (Other (Severe))  cephalexin (Unknown)  Cipro (Unknown)  naproxen (Unknown)  penicillin V potassium (Unknown)  sulfonamides (Unknown)    FAMILY HISTORY: N/C    SOCIAL HISTORY  Smoking History: non smoker  Alcohol: denied  Drugs: denied  Occupation: retired  MEDICATIONS  (STANDING):  chlorhexidine 4% Liquid 1 Application(s) Topical <User Schedule>  doxycycline hyclate Capsule 100 milliGRAM(s) Oral every 12 hours  heparin  Injectable 5000 Unit(s) SubCutaneous every 8 hours  sodium chloride 3%  Inhalation 3 milliLiter(s) Inhalation daily  sotalol 80 milliGRAM(s) Oral two times a day    MEDICATIONS  (PRN):  acetaminophen   Tablet .. 650 milliGRAM(s) Oral every 6 hours PRN Temp greater or equal to 38C (100.4F)    REVIEW OF SYSTEMS:  CONSTITUTIONAL: low grde fevers no chills.   HEENT: No visual disturbance or sore throat. Headache with temporal tenderness & pain.  NECK: No pain or stiffness  RESPIRATORY: No SOB/NORWOOD or wheeze. Dry trickle cough.  CARDIOVASCULAR: No chest pain or palpitations  GASTROINTESTINAL:  No nausea, vomiting, or diarrhea. No abdominal pain.  GENITOURINARY: No dysuria, frequency or hematuria  NEUROLOGICAL: No numbness or headache  EXTREMITIES: No edema  No calf pain or tenderness    Vital Signs Last 24 Hrs  T(F): 97.3 (2019 05:38), Max: 98 (2019 20:48)  HR: 66 (2019 05:38) (63 - 80)  BP: 131/59 (2019 05:38) (121/56 - 131/59)  RR: 16 (2019 05:38) (16 - 17)  SpO2: 97% on RA    PHYSICAL EXAM:  Constitutional: A A O x 3 NAD Freely speaking.  HEAD: PERRLA, No JVD, Atraumatic, Normocephalic. Tenderness over temporal artery   Neck: No neck pain  Respiratory: Clear bilaterally   Cardiovascular: regular rate and rhythm  Gastrointestinal: Soft NT +BS  Extremities: No E/C/C No calf pain or tenderness. Movement in all four extremities  Skin: No lesions    LABS:                        10.4   4.33  )-----------( 285      ( 2019 07:54 )             31.2         138  |  98  |  12  ----------------------------<  132<H>  3.8   |  32  |  <0.5<L>    Ca    8.8      2019 07:54  TPro  6.1  /  Alb  3.4<L>  /  TBili  <0.2  /  DBili  x   /  AST  13  /  ALT  14  /  AlkPhos  71    PT/INR - ( 2019 16:00 )   PT: 14.00 sec;   INR: 1.30 ratio    PTT - ( 2019 16:00 )  PTT:30.5 sec  Urinalysis Basic - ( 2019 16:13 )  Color: Yellow / Appearance: Clear / S.010 / pH: x  Gluc: x / Ketone: Negative  / Bili: Negative / Urobili: 0.2 mg/dL   Blood: x / Protein: Negative mg/dL / Nitrite: Negative   Leuk Esterase: Negative / RBC: 6-10 /HPF / WBC 3-5 /HPF   Sq Epi: x / Non Sq Epi: Occasional /HPF / Bacteria: Few    Sed rate: 110    TB Gold - ?    RADIOLOGY:  Chest X-Ray: RUL chronic changes    Chest CT:  PROCEDURE DATE:  10/10/2018      Comparison: CT chest most recent dated 10/3/2017 and most remotely dated 2015    Findings:     Tubes/Lines: None.    Lungs, Pleura, and Airways: Central tracheobronchial tree is patent.   Re-demonstrated is persistent chronic peribronchial wall thickening and   areas of mucoid impaction bilaterally, most prominent in the right upper   lobe. Tree-in-bud nodularity and areas of scarring/bronchiectasis are   overall unchanged. Chronic calcified granulomas at the right upper lobe.   No new suspicious masses or nodules.    Mediastinum/Lymph Nodes: No enlarged axillary, hilar or axillary   lymphadenopathy. Subcentimeter left thyroid lobe calcified nodule.    Heart/Great Vessels: Heart is normal in size. No pericardial effusion.   The great vessels are normal in caliber. Atherosclerotic calcifications   of the thoracic aorta and coronary arteries.    Abdomen: Limited evaluation of the upper abdomen is unremarkable.    Bones and soft tissues: Left breast calcifications, stable. No acute   osseous abnormalities.    IMPRESSION:  Since October 3, 2017;     1. Overall stable examination with areas of peribronchial thickening,   mucoid impaction and tree-in-bud nodularity. Findings consistent with   small airway disease, inflammatory or infectious (including RAMON)   etiology.     2. Chronic scarring and bronchiectasis, as above, unchanged.     3. No new suspicious masses or nodules.      CHRISTOPHER LUCIANO M.D., RESIDENT RADIOLOGIST  This document has been electronically signed.  ELLIOT LANDAU M.D., ATTENDING RADIOLOGIST  This document has been electronically signed. Oct 11 2018 10:23AM    CT Head: negative     ASSESSMENT:  Headache - likely temporal arteritis   Cough - nasal drip and sinusitis   Chronic scarring and bronchiectasis  Chronic calcified granulomas at the right upper lobe  Chronic peribronchial wall thickening and areas of mucoid impaction bilaterally, most prominent in the right upper lobe.   UNLIKELY MTB      PLAN:  Needs a biopsy ASAP  TB Gold test preferred and hold sputum AFB as patient has non productive cough  Nasal saline wash  Flonase nasal spray  GI / DVT prophylaxis   Out patient follow up  THANK YOU

## 2019-01-28 LAB
ALLERGY+IMMUNOLOGY DIAG STUDY NOTE: SIGNIFICANT CHANGE UP
ANION GAP SERPL CALC-SCNC: 12 MMOL/L — SIGNIFICANT CHANGE UP (ref 7–14)
APTT BLD: 31.2 SEC — SIGNIFICANT CHANGE UP (ref 27–39.2)
BUN SERPL-MCNC: 10 MG/DL — SIGNIFICANT CHANGE UP (ref 10–20)
CALCIUM SERPL-MCNC: 8.8 MG/DL — SIGNIFICANT CHANGE UP (ref 8.5–10.1)
CHLORIDE SERPL-SCNC: 93 MMOL/L — LOW (ref 98–110)
CO2 SERPL-SCNC: 28 MMOL/L — SIGNIFICANT CHANGE UP (ref 17–32)
CREAT SERPL-MCNC: 0.6 MG/DL — LOW (ref 0.7–1.5)
GLUCOSE SERPL-MCNC: 110 MG/DL — HIGH (ref 70–99)
HCT VFR BLD CALC: 33 % — LOW (ref 37–47)
HGB BLD-MCNC: 10.8 G/DL — LOW (ref 12–16)
INR BLD: 1.13 RATIO — SIGNIFICANT CHANGE UP (ref 0.65–1.3)
MCHC RBC-ENTMCNC: 29.8 PG — SIGNIFICANT CHANGE UP (ref 27–31)
MCHC RBC-ENTMCNC: 32.7 G/DL — SIGNIFICANT CHANGE UP (ref 32–37)
MCV RBC AUTO: 91.2 FL — SIGNIFICANT CHANGE UP (ref 81–99)
NRBC # BLD: 0 /100 WBCS — SIGNIFICANT CHANGE UP (ref 0–0)
PLATELET # BLD AUTO: 310 K/UL — SIGNIFICANT CHANGE UP (ref 130–400)
POTASSIUM SERPL-MCNC: 4.3 MMOL/L — SIGNIFICANT CHANGE UP (ref 3.5–5)
POTASSIUM SERPL-SCNC: 4.3 MMOL/L — SIGNIFICANT CHANGE UP (ref 3.5–5)
PROTHROM AB SERPL-ACNC: 12.2 SEC — SIGNIFICANT CHANGE UP (ref 9.95–12.87)
RBC # BLD: 3.62 M/UL — LOW (ref 4.2–5.4)
RBC # FLD: 13.1 % — SIGNIFICANT CHANGE UP (ref 11.5–14.5)
SODIUM SERPL-SCNC: 133 MMOL/L — LOW (ref 135–146)
TYPE + AB SCN PNL BLD: SIGNIFICANT CHANGE UP
WBC # BLD: 5.81 K/UL — SIGNIFICANT CHANGE UP (ref 4.8–10.8)
WBC # FLD AUTO: 5.81 K/UL — SIGNIFICANT CHANGE UP (ref 4.8–10.8)

## 2019-01-28 PROCEDURE — 99232 SBSQ HOSP IP/OBS MODERATE 35: CPT

## 2019-01-28 RX ORDER — HEPARIN SODIUM 5000 [USP'U]/ML
5000 INJECTION INTRAVENOUS; SUBCUTANEOUS ONCE
Qty: 0 | Refills: 0 | Status: COMPLETED | OUTPATIENT
Start: 2019-01-28 | End: 2019-01-28

## 2019-01-28 RX ADMIN — HEPARIN SODIUM 5000 UNIT(S): 5000 INJECTION INTRAVENOUS; SUBCUTANEOUS at 13:02

## 2019-01-28 RX ADMIN — Medication 80 MILLIGRAM(S): at 21:26

## 2019-01-28 RX ADMIN — Medication 60 MILLIGRAM(S): at 13:01

## 2019-01-28 RX ADMIN — Medication 80 MILLIGRAM(S): at 09:29

## 2019-01-28 RX ADMIN — HEPARIN SODIUM 5000 UNIT(S): 5000 INJECTION INTRAVENOUS; SUBCUTANEOUS at 05:25

## 2019-01-28 RX ADMIN — CHLORHEXIDINE GLUCONATE 1 APPLICATION(S): 213 SOLUTION TOPICAL at 05:36

## 2019-01-28 NOTE — PROGRESS NOTE ADULT - SUBJECTIVE AND OBJECTIVE BOX
S; Pt still with left sided head and eye pain. Denies visual disturbance  O; Vital Signs Last 24 Hrs  T(C): 36.8 (28 Jan 2019 09:27), Max: 38.3 (27 Jan 2019 13:53)  T(F): 98.2 (28 Jan 2019 09:27), Max: 101 (27 Jan 2019 13:53)  HR: 82 (28 Jan 2019 09:27) (72 - 82)  BP: 132/62 (28 Jan 2019 09:27) (115/72 - 137/63)  BP(mean): --  RR: 18 (28 Jan 2019 05:41) (17 - 18)  SpO2: 99% (28 Jan 2019 09:27) (94% - 99%)      Culture - Urine (collected 25 Jan 2019 16:13)  Source: .Urine Clean Catch (Midstream)  Final Report (26 Jan 2019 21:49):    No growth    Culture - Blood (collected 25 Jan 2019 15:25)  Source: .Blood Blood-Peripheral  Preliminary Report (27 Jan 2019 17:00):    No growth to date.    Culture - Blood (collected 25 Jan 2019 15:25)  Source: .Blood Blood-Peripheral  Preliminary Report (27 Jan 2019 17:00):    No growth to date. S; Pt still with left sided head and eye pain. Denies visual disturbance  O; Vital Signs Last 24 Hrs  T(C): 36.8 (28 Jan 2019 09:27), Max: 38.3 (27 Jan 2019 13:53)  T(F): 98.2 (28 Jan 2019 09:27), Max: 101 (27 Jan 2019 13:53)  HR: 82 (28 Jan 2019 09:27) (72 - 82)  BP: 132/62 (28 Jan 2019 09:27) (115/72 - 137/63)  BP(mean): --  RR: 18 (28 Jan 2019 05:41) (17 - 18)  SpO2: 99% (28 Jan 2019 09:27) (94% - 99%)      Labs:  PENDING TODAY      Culture - Urine (collected 25 Jan 2019 16:13)  Source: .Urine Clean Catch (Midstream)  Final Report (26 Jan 2019 21:49):    No growth    Culture - Blood (collected 25 Jan 2019 15:25)  Source: .Blood Blood-Peripheral  Preliminary Report (27 Jan 2019 17:00):    No growth to date.    Culture - Blood (collected 25 Jan 2019 15:25)  Source: .Blood Blood-Peripheral  Preliminary Report (27 Jan 2019 17:00):    No growth to date.    Sedimentation Rate, Erythrocyte (01.25.19 @ 16:13)    Sedimentation Rate, Erythrocyte: 110 mm/Hr

## 2019-01-28 NOTE — PROGRESS NOTE ADULT - SUBJECTIVE AND OBJECTIVE BOX
Patient is a 78y old  Female who presents with a chief complaint of ? temporal arteritis (26 Jan 2019 13:24)      INTERVAL HPI/OVERNIGHT EVENTS:    MEDICATIONS  (STANDING):  chlorhexidine 4% Liquid 1 Application(s) Topical <User Schedule>  predniSONE   Tablet 60 milliGRAM(s) Oral daily  sodium chloride 3%  Inhalation 3 milliLiter(s) Inhalation daily  sotalol 80 milliGRAM(s) Oral two times a day    MEDICATIONS  (PRN):  acetaminophen   Tablet .. 650 milliGRAM(s) Oral every 6 hours PRN Temp greater or equal to 38C (100.4F)      Allergies    amoxicillin (Unknown)  aspirin (Other (Severe))  cephalexin (Unknown)  Cipro (Unknown)  naproxen (Unknown)  penicillin V potassium (Unknown)  sulfonamides (Unknown)    Intolerances        REVIEW OF SYSTEMS:  CONSTITUTIONAL: No fever, weight loss, or fatigue  EYES: No eye pain, visual disturbances, or discharge  ENMT:  No difficulty hearing, tinnitus, vertigo; No sinus or throat pain  NECK: No pain or stiffness  BREASTS: No pain, masses, or nipple discharge  RESPIRATORY: No cough, wheezing, chills or hemoptysis; No shortness of breath  CARDIOVASCULAR: No chest pain, palpitations, dizziness, or leg swelling  GASTROINTESTINAL: No abdominal or epigastric pain. No nausea, vomiting, or hematemesis; No diarrhea or constipation. No melena or hematochezia.  GENITOURINARY: No dysuria, frequency, hematuria, or incontinence  NEUROLOGICAL: No headaches, memory loss, loss of strength, numbness, or tremors  SKIN: No itching, burning, rashes, or lesions   LYMPH NODES: No enlarged glands  ENDOCRINE: No heat or cold intolerance; No hair loss  MUSCULOSKELETAL: No joint pain or swelling; No muscle, back, or extremity pain  PSYCHIATRIC: No depression, anxiety, mood swings, or difficulty sleeping  HEME/LYMPH: No easy bruising, or bleeding gums  ALLERGY AND IMMUNOLOGIC: No hives or eczema    Vital Signs Last 24 Hrs  T(C): 36.9 (28 Jan 2019 13:38), Max: 37.1 (28 Jan 2019 05:41)  T(F): 98.5 (28 Jan 2019 13:38), Max: 98.8 (28 Jan 2019 05:41)  HR: 74 (28 Jan 2019 13:38) (74 - 82)  BP: 158/71 (28 Jan 2019 13:38) (132/62 - 158/71)  BP(mean): --  RR: 18 (28 Jan 2019 05:41) (18 - 18)  SpO2: 99% (28 Jan 2019 09:27) (94% - 99%)    PHYSICAL EXAM:  GENERAL: NAD, well-groomed, well-developed  HEAD:  Atraumatic, Normocephalic  EYES: EOMI, PERRLA, conjunctiva and sclera clear  ENMT: No tonsillar erythema, exudates, or enlargement; Moist mucous membranes, Good dentition, No lesions  NECK: Supple, No JVD, Normal thyroid  NERVOUS SYSTEM:  Alert & Oriented X3, Good concentration; Motor Strength 5/5 B/L upper and lower extremities; DTRs 2+ intact and symmetric  CHEST/LUNG: Clear to percussion bilaterally; No rales, rhonchi, wheezing, or rubs  HEART: Regular rate and rhythm; No murmurs, rubs, or gallops  ABDOMEN: Soft, Nontender, Nondistended; Bowel sounds present  EXTREMITIES:  2+ Peripheral Pulses, No clubbing, cyanosis, or edema  LYMPH: No lymphadenopathy noted  SKIN: No rashes or lesions        LABS:                        10.8   5.81  )-----------( 310      ( 28 Jan 2019 15:42 )             33.0         01-28    133<L>  |  93<L>  |  10  ----------------------------<  110<H>  4.3   |  28  |  0.6<L>    Ca    8.8      28 Jan 2019 15:42      PT/INR - ( 28 Jan 2019 15:42 )   PT: 12.20 sec;   INR: 1.13 ratio    PTT - ( 28 Jan 2019 15:42 )  PTT:31.2 sec            RADIOLOGY & ADDITIONAL TESTS: Patient is seen and examined at the bed side, is afebrile. She is doing little better, but still has headache.         REVIEW OF SYSTEMS: All other review systems are negative        Vital Signs Last 24 Hrs  T(C): 36.9 (28 Jan 2019 13:38), Max: 37.1 (28 Jan 2019 05:41)  T(F): 98.5 (28 Jan 2019 13:38), Max: 98.8 (28 Jan 2019 05:41)  HR: 74 (28 Jan 2019 13:38) (74 - 82)  BP: 158/71 (28 Jan 2019 13:38) (132/62 - 158/71)  BP(mean): --  RR: 18 (28 Jan 2019 05:41) (18 - 18)  SpO2: 99% (28 Jan 2019 09:27) (94% - 99%)        PHYSICAL EXAM:  GENERAL: Not in distress  CHEST/LUNG: Air entry  bilaterally  HEART: s1 and s2 present   ABDOMEN: Nontender, Nondistended  EXTREMITIES: No pedal edema  CNS: Awake, alert  and oriented X 3        MEDICATIONS  (STANDING):  chlorhexidine 4% Liquid 1 Application(s) Topical <User Schedule>  predniSONE   Tablet 60 milliGRAM(s) Oral daily  sodium chloride 3%  Inhalation 3 milliLiter(s) Inhalation daily  sotalol 80 milliGRAM(s) Oral two times a day    MEDICATIONS  (PRN):  acetaminophen   Tablet .. 650 milliGRAM(s) Oral every 6 hours PRN Temp greater or equal to 38C (100.4F)      Allergies  amoxicillin (Unknown)  aspirin (Other (Severe))  cephalexin (Unknown)  Cipro (Unknown)  naproxen (Unknown)  penicillin V potassium (Unknown)  sulfonamides (Unknown)            LABS:                        10.8   5.81  )-----------( 310      ( 28 Jan 2019 15:42 )             33.0         01-28    133<L>  |  93<L>  |  10  ----------------------------<  110<H>  4.3   |  28  |  0.6<L>    Ca    8.8      28 Jan 2019 15:42      PT/INR - ( 28 Jan 2019 15:42 )   PT: 12.20 sec;   INR: 1.13 ratio    PTT - ( 28 Jan 2019 15:42 )  PTT:31.2 sec        RADIOLOGY & ADDITIONAL TESTS:    < from: Xray Chest 2 Views PA/Lat (01.25.19 @ 15:52) >  Right upper lobe opacities, stable.. Right upper lobe nodular opacity   better seen on the current examination.       Findings were reported to Milan Everett on 1/25/2019 at 4:05 PM with   readback.      < end of copied text >      < from: Xray Chest 2 Views PA/Lat (01.23.19 @ 20:38) >    Right upper lobe opacities      < end of copied text >

## 2019-01-28 NOTE — PROGRESS NOTE ADULT - ASSESSMENT
IMPRESSION:  Although I think MTB low in differential diagnosis would still rule out given the scenario of calcified nodule, night sweats and fevers  Also has pain left temporal area which responded to steroids. R/O temporal erteritis    RECOMMENDATIONS  1.  Follow up Quantiferon assay  2. Obtain Induced sputum  for AFB stain and culture X 3  3. Consider a temporal artery biopsy since ESR is elevate   4. Monitor oFF antibiotics IMPRESSION:  # MTB less likely but  in differential diagnosis, hence would like to rule out in the setting  of calcified nodule, night sweats and fevers  # R/O temporal Arteritis     would recommend:     1.  Follow up Quantiferon test   2. Obtain Induced sputum  for AFB stain and culture X 3  3. Consider a temporal artery biopsy since ESR is elevate   4. Monitor oFF antibiotics      d/w Patient in  a great details    -will follow up

## 2019-01-28 NOTE — PROGRESS NOTE ADULT - ASSESSMENT
79yo female, recently started on oral doxycycline for sinusitis but subsequently found to have RUL opacity on CXR is referred to the ER due to episodic fevers up to 101.1 for 19 days,  and left sided headache in addition to above.    Problem/Plan - 1:  ·  Problem: Headache.  Plan: Concern for TA in view of elevated ESR Vascular eval- Temoral artery biopsy?   Reviewed Neurology recommendations and Input- called vascular eval for Temopral ar biopsy- Xarelto to be held - heparin Sq- Also will hold Prednsione till ro TB   Problem/Plan - 2:  ·  Problem: Hypertension, unspecified type.  Plan: SOTALOL.    Problem/Plan - 3:  ·  Problem: Atrial fibrillation, unspecified type.  Plan: Sotalol, as noted above holding Xarelto pending decision on need for biopsy.    Problem/Plan - 4:  ·  Problem: Opacity of lung on imaging study.  Plan: Quantiferon assay as per infectious disease -Previopus CT SCans showed same calcified granuloma  Induced sputa for AFB  DVT, GI Prophylaxis  Dispo: Acute- Patient co again of severe headache today- Call ailyn,- Awaiting Rajendra Arce Prednisone  Pager No.  [phone] 77yo female, recently started on oral doxycycline for sinusitis but subsequently found to have RUL opacity on CXR is referred to the ER due to episodic fevers up to 101.1 for 19 days, and left sided headache in addition to above.    Problem/Plan - 1:  ·  Problem: Headache.  Plan: Concern for TA in view of elevated ESR Vascular eval- Temoral artery biopsy scheduled for tomorrow 7:30am  -start prednisone today   Problem/Plan - 2:  ·  Problem: Hypertension, unspecified type.  Plan: SOTALOL.    Problem/Plan - 3:  ·  Problem: Atrial fibrillation, unspecified type.  Plan: Sotalol, as noted above holding Xarelto pending decision on need for biopsy.    Problem/Plan - 4:  ·  Problem: Opacity of lung on imaging study.  Plan: Quantiferon assay as per infectious disease Previous CT scan showed same calcified granuloma as per Dr. Durga kaur  DVT, GI Prophylaxis

## 2019-01-28 NOTE — PROGRESS NOTE ADULT - SUBJECTIVE AND OBJECTIVE BOX
Chief Complaint:  Patient is a 78y old  Female who presents with a chief complaint of fever and headache for 19 days (2019 13:24)      Interval Events:   Pt still complain of left sided headache    Allergies:  amoxicillin (Unknown)  aspirin (Other (Severe))  cephalexin (Unknown)  Cipro (Unknown)  naproxen (Unknown)  penicillin V potassium (Unknown)  sulfonamides (Unknown)    MEDICATIONS  (STANDING):  chlorhexidine 4% Liquid 1 Application(s) Topical <User Schedule>  heparin  Injectable 5000 Unit(s) SubCutaneous once  predniSONE   Tablet 60 milliGRAM(s) Oral daily  sodium chloride 3%  Inhalation 3 milliLiter(s) Inhalation daily  sotalol 80 milliGRAM(s) Oral two times a day    MEDICATIONS  (PRN):  acetaminophen   Tablet .. 650 milliGRAM(s) Oral every 6 hours PRN Temp greater or equal to 38C (100.4F)      PMHX/PSHX:    HTN (hypertension)  Atrial fibrillation    ROS:   As mentioned below      PHYSICAL EXAM:   Vital Signs Last 24 Hrs  T(C): 36.8 (2019 09:27), Max: 38.3 (2019 13:53)  T(F): 98.2 (2019 09:27), Max: 101 (2019 13:53)  HR: 82 (2019 09:27) (72 - 82)  BP: 132/62 (2019 09:27) (115/72 - 137/63)  BP(mean): --  RR: 18 (2019 05:41) (17 - 18)  SpO2: 99% (2019 09:27) (94% - 99%) on room air    GENERAL:  NAD  HEENT:  NC/AT,  No Thyromegaly  CHEST:  CTA B/L  HEART:  S1, S2- No M, R, G  ABDOMEN:  Soft, NT, ND  EXTEREMITIES:  no cyanosis,clubbing or edema  SKIN:  NAD  NEURO:  Grossly Nr.    LABS:                        10.4   4.33  )-----------( 285      ( 2019 07:54 )             31.2         138  |  98  |  12  ----------------------------<  132<H>  3.8   |  32  |  <0.5<L>    Ca    8.8      2019 07:54    TPro  6.1  /  Alb  3.4<L>  /  TBili  <0.2  /  DBili  x   /  AST  13  /  ALT  14  /  AlkPhos  71      LIVER FUNCTIONS - ( 2019 07:54 )  Alb: 3.4 g/dL / Pro: 6.1 g/dL / ALK PHOS: 71 U/L / ALT: 14 U/L / AST: 13 U/L / GGT: x           PT/INR - ( 2019 16:00 )   PT: 14.00 sec;   INR: 1.30 ratio         PTT - ( 2019 16:00 )  PTT:30.5 sec  Urinalysis Basic - ( 2019 16:13 )    Color: Yellow / Appearance: Clear / S.010 / pH: x  Gluc: x / Ketone: Negative  / Bili: Negative / Urobili: 0.2 mg/dL   Blood: x / Protein: Negative mg/dL / Nitrite: Negative   Leuk Esterase: Negative / RBC: 6-10 /HPF / WBC 3-5 /HPF   Sq Epi: x / Non Sq Epi: Occasional /HPF / Bacteria: Few          Imaging: Chief Complaint:  Patient is a 78y old  Female who presents with a chief complaint of fever and headache for 19 days (2019 13:24)      Interval Events:   Pt still complain of left sided headache    Allergies:  amoxicillin (Unknown)  aspirin (Other (Severe))  cephalexin (Unknown)  Cipro (Unknown)  naproxen (Unknown)  penicillin V potassium (Unknown)  sulfonamides (Unknown)    MEDICATIONS  (STANDING):  chlorhexidine 4% Liquid 1 Application(s) Topical <User Schedule>  heparin  Injectable 5000 Unit(s) SubCutaneous once  predniSONE   Tablet 60 milliGRAM(s) Oral daily  sodium chloride 3%  Inhalation 3 milliLiter(s) Inhalation daily  sotalol 80 milliGRAM(s) Oral two times a day    MEDICATIONS  (PRN):  acetaminophen   Tablet .. 650 milliGRAM(s) Oral every 6 hours PRN Temp greater or equal to 38C (100.4F)      PMHX/PSHX:    HTN (hypertension)  Atrial fibrillation    ROS:   As mentioned below      PHYSICAL EXAM:   Vital Signs Last 24 Hrs  T(C): 36.8 (2019 09:27), Max: 38.3 (2019 13:53)  T(F): 98.2 (2019 09:27), Max: 101 (2019 13:53)  HR: 82 (2019 09:27) (72 - 82)  BP: 132/62 (2019 09:27) (115/72 - 137/63)  BP(mean): --  RR: 18 (2019 05:41) (17 - 18)  SpO2: 99% (2019 09:27) (94% - 99%) on room air    GENERAL:  NAD  HEENT:  NC/AT,  No Thyromegaly  CHEST:  CTA B/L  HEART:  S1, S2- No M, R, G  ABDOMEN:  Soft, NT, ND  EXTEREMITIES:  no cyanosis,clubbing or edema  SKIN:  NAD  NEURO:  Grossly Nr.    LABS: NO NEW LABS                        10.4   4.33  )-----------( 285      ( 2019 07:54 )             31.2         138  |  98  |  12  ----------------------------<  132<H>  3.8   |  32  |  <0.5<L>    Ca    8.8      2019 07:54    TPro  6.1  /  Alb  3.4<L>  /  TBili  <0.2  /  DBili  x   /  AST  13  /  ALT  14  /  AlkPhos  71      LIVER FUNCTIONS - ( 2019 07:54 )  Alb: 3.4 g/dL / Pro: 6.1 g/dL / ALK PHOS: 71 U/L / ALT: 14 U/L / AST: 13 U/L / GGT: x           PT/INR - ( 2019 16:00 )   PT: 14.00 sec;   INR: 1.30 ratio         PTT - ( 2019 16:00 )  PTT:30.5 sec  Urinalysis Basic - ( 2019 16:13 )    Color: Yellow / Appearance: Clear / S.010 / pH: x  Gluc: x / Ketone: Negative  / Bili: Negative / Urobili: 0.2 mg/dL   Blood: x / Protein: Negative mg/dL / Nitrite: Negative   Leuk Esterase: Negative / RBC: 6-10 /HPF / WBC 3-5 /HPF   Sq Epi: x / Non Sq Epi: Occasional /HPF / Bacteria: Few

## 2019-01-29 ENCOUNTER — APPOINTMENT (OUTPATIENT)
Dept: VASCULAR SURGERY | Facility: HOSPITAL | Age: 79
End: 2019-01-29
Payer: MEDICARE

## 2019-01-29 ENCOUNTER — RESULT REVIEW (OUTPATIENT)
Age: 79
End: 2019-01-29

## 2019-01-29 DIAGNOSIS — R91.1 SOLITARY PULMONARY NODULE: ICD-10-CM

## 2019-01-29 LAB
GAMMA INTERFERON BACKGROUND BLD IA-ACNC: 0.02 IU/ML — SIGNIFICANT CHANGE UP
M TB IFN-G BLD-IMP: NEGATIVE — SIGNIFICANT CHANGE UP
M TB IFN-G CD4+ BCKGRND COR BLD-ACNC: 0 IU/ML — SIGNIFICANT CHANGE UP
M TB IFN-G CD4+CD8+ BCKGRND COR BLD-ACNC: 0 IU/ML — SIGNIFICANT CHANGE UP
QUANT TB PLUS MITOGEN MINUS NIL: 0.6 IU/ML — SIGNIFICANT CHANGE UP

## 2019-01-29 PROCEDURE — 37609 LIGATION/BX TEMPORAL ARTERY: CPT | Mod: LT

## 2019-01-29 RX ORDER — CHLORHEXIDINE GLUCONATE 213 G/1000ML
1 SOLUTION TOPICAL
Qty: 0 | Refills: 0 | Status: DISCONTINUED | OUTPATIENT
Start: 2019-01-29 | End: 2019-01-30

## 2019-01-29 RX ORDER — SOTALOL HCL 120 MG
80 TABLET ORAL EVERY 12 HOURS
Qty: 0 | Refills: 0 | Status: DISCONTINUED | OUTPATIENT
Start: 2019-01-30 | End: 2019-01-30

## 2019-01-29 RX ORDER — SOTALOL HCL 120 MG
80 TABLET ORAL ONCE
Qty: 0 | Refills: 0 | Status: COMPLETED | OUTPATIENT
Start: 2019-01-29 | End: 2019-01-29

## 2019-01-29 RX ORDER — SOTALOL HCL 120 MG
80 TABLET ORAL
Qty: 0 | Refills: 0 | Status: DISCONTINUED | OUTPATIENT
Start: 2019-01-29 | End: 2019-01-29

## 2019-01-29 RX ORDER — ACETAMINOPHEN 500 MG
650 TABLET ORAL EVERY 6 HOURS
Qty: 0 | Refills: 0 | Status: DISCONTINUED | OUTPATIENT
Start: 2019-01-29 | End: 2019-01-30

## 2019-01-29 RX ORDER — SODIUM CHLORIDE 9 MG/ML
1000 INJECTION, SOLUTION INTRAVENOUS
Qty: 0 | Refills: 0 | Status: DISCONTINUED | OUTPATIENT
Start: 2019-01-29 | End: 2019-01-29

## 2019-01-29 RX ORDER — SODIUM CHLORIDE 9 MG/ML
3 INJECTION INTRAMUSCULAR; INTRAVENOUS; SUBCUTANEOUS DAILY
Qty: 0 | Refills: 0 | Status: DISCONTINUED | OUTPATIENT
Start: 2019-01-29 | End: 2019-01-29

## 2019-01-29 RX ORDER — HEPARIN SODIUM 5000 [USP'U]/ML
5000 INJECTION INTRAVENOUS; SUBCUTANEOUS EVERY 8 HOURS
Qty: 0 | Refills: 0 | Status: DISCONTINUED | OUTPATIENT
Start: 2019-01-29 | End: 2019-01-29

## 2019-01-29 RX ORDER — HEPARIN SODIUM 5000 [USP'U]/ML
5000 INJECTION INTRAVENOUS; SUBCUTANEOUS EVERY 8 HOURS
Qty: 0 | Refills: 0 | Status: DISCONTINUED | OUTPATIENT
Start: 2019-01-30 | End: 2019-01-30

## 2019-01-29 RX ADMIN — Medication 650 MILLIGRAM(S): at 22:00

## 2019-01-29 RX ADMIN — Medication 80 MILLIGRAM(S): at 13:07

## 2019-01-29 RX ADMIN — Medication 650 MILLIGRAM(S): at 21:42

## 2019-01-29 RX ADMIN — Medication 60 MILLIGRAM(S): at 05:23

## 2019-01-29 NOTE — PROGRESS NOTE ADULT - SUBJECTIVE AND OBJECTIVE BOX
Since patient is very anxious about shaving the hair in temple region and only symptomatic on left side, with temple tenderness, We will perform left temporal artery biopsy only. D/w patient who is in agreement.

## 2019-01-29 NOTE — BRIEF OPERATIVE NOTE - PROCEDURE
<<-----Click on this checkbox to enter Procedure Biopsy of left temporal artery  01/29/2019    Active  AVIVIANO

## 2019-01-29 NOTE — CHART NOTE - NSCHARTNOTEFT_GEN_A_CORE
PACU ANESTHESIA ADMISSION NOTE      Procedure:   Post op diagnosis:  Temporal arteritis      ____  Intubated  TV:______       Rate: ______      FiO2: ______    _x___  Patent Airway    _x___  Full return of protective reflexes    _x___  Full recovery from anesthesia / back to baseline status    Vitals:  BP:           155/77      HR:  73  RR:  20  Temp:  97.7  O2Sat:  100    Mental Status:  _x___ Awake   _____ Alert   _____ Drowsy   _____ Sedated    Nausea/Vomiting:  _x___  NO       ______Yes,   See Post - Op Orders         Pain Scale (0-10):  __0___    Treatment: _x___ None    ____ See Post - Op/PCA Orders    Post - Operative Fluids:   __x__ Oral   ____ See Post - Op Orders    Plan: Discharge:   _x___Home       _____Floor     _____Critical Care    _____  Other:_________________    Comments:  Intraoperative course uneventful.  No anesthesia issues or complications noted.  Discharge when criteria met.

## 2019-01-29 NOTE — PROGRESS NOTE ADULT - SUBJECTIVE AND OBJECTIVE BOX
Chief Complaint:  Patient is a 78y old Female who presents with a chief complaint of fever and headache for 19 days (2019 13:24)      Interval Events:   s/p TA biopsy this morning  Feels well    Allergies:  amoxicillin (Unknown)  aspirin (Other (Severe))  cephalexin (Unknown)  Cipro (Unknown)  naproxen (Unknown)  penicillin V potassium (Unknown)  sulfonamides (Unknown)      MEDICATIONS  (STANDING):  chlorhexidine 4% Liquid 1 Application(s) Topical <User Schedule>  predniSONE   Tablet 60 milliGRAM(s) Oral daily  sotalol 80 milliGRAM(s) Oral two times a day    MEDICATIONS  (PRN):  acetaminophen   Tablet .. 650 milliGRAM(s) Oral every 6 hours PRN Temp greater or equal to 38C (100.4F)        PMHX/PSHX:    HTN (hypertension)  Atrial fibrillation    ROS:   As mentioned below      PHYSICAL EXAM:   Vital Signs Last 24 Hrs  T(C): 35.9 (2019 10:00), Max: 36.9 (2019 13:38)  T(F): 96.6 (2019 10:00), Max: 98.5 (2019 13:38)  HR: 59 (2019 10:00) (58 - 76)  BP: 136/70 (2019 10:00) (136/70 - 158/71)  BP(mean): --  RR: 15 (2019 09:46) (12 - 20)  SpO2: 100% (2019 09:46) (100% - 100%) on room air    GENERAL:  NAD  HEENT:  NC/AT,  No Thyromegaly  CHEST:  CTA B/L  HEART:  S1, S2- No M, R, G  ABDOMEN:  Soft, NT, ND  EXTEREMITIES:  no cyanosis,clubbing or edema  SKIN:  NAD, dressing to Left temple  NEURO:  Grossly Nr.    LABS: NO NEW LABS                        10.4   4.33  )-----------( 285      ( 2019 07:54 )             31.2         138  |  98  |  12  ----------------------------<  132<H>  3.8   |  32  |  <0.5<L>    Ca    8.8      2019 07:54    TPro  6.1  /  Alb  3.4<L>  /  TBili  <0.2  /  DBili  x   /  AST  13  /  ALT  14  /  AlkPhos  71      LIVER FUNCTIONS - ( 2019 07:54 )  Alb: 3.4 g/dL / Pro: 6.1 g/dL / ALK PHOS: 71 U/L / ALT: 14 U/L / AST: 13 U/L / GGT: x           PT/INR - ( 2019 16:00 )   PT: 14.00 sec;   INR: 1.30 ratio         PTT - ( 2019 16:00 )  PTT:30.5 sec  Urinalysis Basic - ( 2019 16:13 )    Color: Yellow / Appearance: Clear / S.010 / pH: x  Gluc: x / Ketone: Negative  / Bili: Negative / Urobili: 0.2 mg/dL   Blood: x / Protein: Negative mg/dL / Nitrite: Negative   Leuk Esterase: Negative / RBC: 6-10 /HPF / WBC 3-5 /HPF   Sq Epi: x / Non Sq Epi: Occasional /HPF / Bacteria: Few

## 2019-01-29 NOTE — PROGRESS NOTE ADULT - PROBLEM SELECTOR PLAN 1
See Pulmonary notes  NOT NEW    NO abx AND NO other work up     if Quantiferon is positive - then INH & Vit B 6 for 9 months  DC isolation     recall if needed

## 2019-01-29 NOTE — CHART NOTE - NSCHARTNOTEFT_GEN_A_CORE
Case D/W Dr. Jones: pt s/p left temporal artery biopsy today.     - may restart xarelto in 2 days (on 1/31/19)   - may restart prophylactic SQ heparin tomorrow 1/30/19   - may remove dressing and take shower/wash hair in 2 days (1/31/19)   - follow up in office in 2 weeks

## 2019-01-29 NOTE — PROGRESS NOTE ADULT - SUBJECTIVE AND OBJECTIVE BOX
infectious diseases progress note:  GRACIELA GUEVARA is a 78yFemale patient    HEADACHE    Opacity of lung on imaging study  Atrial fibrillation, unspecified type  Hypertension, unspecified type  Headache      ROS:  not relevant   Allergies    amoxicillin (Unknown)  aspirin (Other (Severe))  cephalexin (Unknown)  Cipro (Unknown)  naproxen (Unknown)  penicillin V potassium (Unknown)  sulfonamides (Unknown)    Intolerances        ANTIBIOTICS/RELEVANT:  antimicrobials    immunologic:    OTHER:  acetaminophen   Tablet .. 650 milliGRAM(s) Oral every 6 hours PRN  chlorhexidine 4% Liquid 1 Application(s) Topical <User Schedule>  predniSONE   Tablet 60 milliGRAM(s) Oral daily  sodium chloride 3%  Inhalation 3 milliLiter(s) Inhalation daily  sotalol 80 milliGRAM(s) Oral two times a day      Objective:  T(F): 97.5 (01-29-19 @ 06:34), Max: 98.5 (01-28-19 @ 13:38)  HR: 68 (01-29-19 @ 06:34) (68 - 82)  BP: 142/78 (01-29-19 @ 06:34) (132/62 - 158/71)  RR: 18 (01-29-19 @ 06:34) (16 - 18)  SpO2: 99% (01-28-19 @ 09:27) (99% - 99%)    PHYSICAL EXAM:  Constitutional:Well-developed, well nourished  Eyes:LIZA, EOMI  Ear/Nose/Throat: no oral lesion, no sinus tenderness on percussion	  Neck:no JVD, no lymphadenopathy, supple  Respiratory: CTA linda  Cardiovascular: S1S2 RRR, no murmurs  Gastrointestinal:soft, (+) BS, no HSM  Extremities:no phlebitis        LABS:                        10.8   5.81  )-----------( 310      ( 28 Jan 2019 15:42 )             33.0     01-28    133<L>  |  93<L>  |  10  ----------------------------<  110<H>  4.3   |  28  |  0.6<L>    Ca    8.8      28 Jan 2019 15:42      PT/INR - ( 28 Jan 2019 15:42 )   PT: 12.20 sec;   INR: 1.13 ratio         PTT - ( 28 Jan 2019 15:42 )  PTT:31.2 sec        MICROBIOLOGY:    Culture - Urine (collected 01-25-19 @ 16:13)  Source: .Urine Clean Catch (Midstream)  Final Report (01-26-19 @ 21:49):    No growth    Culture - Blood (collected 01-25-19 @ 15:25)  Source: .Blood Blood-Peripheral  Preliminary Report (01-27-19 @ 17:00):    No growth to date.    Culture - Blood (collected 01-25-19 @ 15:25)  Source: .Blood Blood-Peripheral  Preliminary Report (01-27-19 @ 17:00):    No growth to date.        Culture Results:   No growth (01-25 @ 16:13)  Culture Results:   No growth to date. (01-25 @ 15:25)  Culture Results:   No growth to date. (01-25 @ 15:25)        RADIOLOGY & ADDITIONAL STUDIES:

## 2019-01-29 NOTE — PROGRESS NOTE ADULT - ASSESSMENT
77yo female, recently started on oral doxycycline for sinusitis but subsequently found to have RUL opacity on CXR is referred to the ER due to episodic fevers up to 101.1 for 19 days, and left sided headache in addition to above.    Problem/Plan - 1:  ·  Problem: Headache.  Plan: Concern for TA in view of elevated ESR Vascular eval- Temoral artery biopsy done this morning-started prednisone yesterday   Problem/Plan - 2:  ·  Problem: Hypertension, unspecified type.  Plan: SOTALOL.    Problem/Plan - 3:  ·  Problem: Atrial fibrillation, unspecified type.  Plan: Sotalol, as noted above holding Xarelto for two days   Problem/Plan - 4:  ·  Problem: Opacity of lung on imaging study.  Plan: Quantiferon assay as per infectious disease Previous CT scan showed same calcified granuloma as per Dr. Durga kaur.  Appreciated ID input.  Await quantiferon result.  DVT, GI Prophylaxis

## 2019-01-30 ENCOUNTER — TRANSCRIPTION ENCOUNTER (OUTPATIENT)
Age: 79
End: 2019-01-30

## 2019-01-30 ENCOUNTER — INBOUND DOCUMENT (OUTPATIENT)
Age: 79
End: 2019-01-30

## 2019-01-30 VITALS
HEART RATE: 70 BPM | TEMPERATURE: 96 F | DIASTOLIC BLOOD PRESSURE: 67 MMHG | SYSTOLIC BLOOD PRESSURE: 152 MMHG | RESPIRATION RATE: 16 BRPM

## 2019-01-30 RX ORDER — RIVAROXABAN 15 MG-20MG
1 KIT ORAL
Qty: 0 | Refills: 0 | COMMUNITY

## 2019-01-30 RX ORDER — PANTOPRAZOLE SODIUM 20 MG/1
1 TABLET, DELAYED RELEASE ORAL
Qty: 30 | Refills: 0
Start: 2019-01-30

## 2019-01-30 RX ORDER — ACETAMINOPHEN 500 MG
2 TABLET ORAL
Qty: 0 | Refills: 0 | DISCHARGE
Start: 2019-01-30

## 2019-01-30 RX ADMIN — CHLORHEXIDINE GLUCONATE 1 APPLICATION(S): 213 SOLUTION TOPICAL at 05:32

## 2019-01-30 RX ADMIN — Medication 80 MILLIGRAM(S): at 05:33

## 2019-01-30 RX ADMIN — Medication 60 MILLIGRAM(S): at 05:32

## 2019-01-30 NOTE — DISCHARGE NOTE ADULT - PROVIDER TOKENS
TOKEN:'[id number]',TOKEN:'[id number]:'[id number]',FREE:[LAST:[opthalmology],PHONE:[(   )    -],FAX:[(   )    -]]

## 2019-01-30 NOTE — DISCHARGE NOTE ADULT - CARE PLAN
Principal Discharge DX:	Temporal arteritis  Goal:	manage symptoms  Assessment and plan of treatment:	follow up biopsy results  follow up with rheumatology/neurology  take medication as prescribed  Secondary Diagnosis:	Atrial fibrillation, unspecified type  Assessment and plan of treatment:	take mediation as prescribed  Secondary Diagnosis:	Hypertension, unspecified type  Assessment and plan of treatment:	take medication as prescribed  Secondary Diagnosis:	Opacity of lung on imaging study  Assessment and plan of treatment:	follow up with Dr. Calixto Principal Discharge DX:	Temporal arteritis  Goal:	manage symptoms  Assessment and plan of treatment:	follow up biopsy results  follow up with rheumatology/neurology/opthalmology  take medication as prescribed - you need to take a long prednisone taper starting with prednisone 60mg once a day for 2 weeks followed by a tapering dose.  Please follow up with the physicians listed above for  further prescriptions.  Secondary Diagnosis:	Atrial fibrillation, unspecified type  Assessment and plan of treatment:	take mediation as prescribed- sotalol, xarelto  Secondary Diagnosis:	Opacity of lung on imaging study  Goal:	chronic granuloma  Assessment and plan of treatment:	follow up with Dr. Calixto

## 2019-01-30 NOTE — DISCHARGE NOTE ADULT - SECONDARY DIAGNOSIS.
Atrial fibrillation, unspecified type Hypertension, unspecified type Opacity of lung on imaging study

## 2019-01-30 NOTE — DISCHARGE NOTE ADULT - HOSPITAL COURSE
77yo female, recently started on oral doxycycline for sinusitis but subsequently found to have RUL opacity on CXR is referred to the ER due to episodic fevers up to 101.1 for 19 days, and left sided headache in addition to above.      Problem/Plan - 1:  ·  Problem: Headache.  Plan: Concern for TA in view of elevated ESR Vascular eval- Temoral artery biopsy done this admission- biopsy results pending-started prednisone.  Will refer for rheuma/neuro/optho follow up.     Problem/Plan - 2:  ·  Problem: Hypertension, unspecified type.  Plan: SOTALOL.      Problem/Plan - 3:  ·  Problem: Atrial fibrillation, unspecified type.  Plan: Sotalol.  Restart Xarelto tomorrow      Problem/Plan - 4:  ·  Problem: Opacity of lung on imaging study.  Plan: Quantiferon assay as per infectious disease is negative.  Previous CT scan showed same calcified granuloma as per Dr. Durga kaur.  Appreciated ID input.     d/c home today  d/c planning took over 65 min

## 2019-01-30 NOTE — PROGRESS NOTE ADULT - ASSESSMENT
79yo female, recently started on oral doxycycline for sinusitis but subsequently found to have RUL opacity on CXR is referred to the ER due to episodic fevers up to 101.1 for 19 days, and left sided headache in addition to above.    Problem/Plan - 1:  ·  Problem: Headache.  Plan: Concern for TA in view of elevated ESR Vascular eval appreciated - s/p Temoral artery biopsy.  Results pending.  started prednisone.  Outpt neuro, opho, rheum and vascular follow up.   Problem/Plan - 2:  ·  Problem: Hypertension, unspecified type.  Plan: SOTALOL.    Problem/Plan - 3:  ·  Problem: Atrial fibrillation, unspecified type.  Plan: Sotalol,  holding Xarelto until tomorrow evening   Problem/Plan - 4:  ·  Problem: Opacity of lung on imaging study.  Plan: Quantiferon assay as per infectious disease is negative.  Previous CT scan showed same calcified granuloma as per Dr. Durga kaur.  Appreciated ID input.    DVT, GI Prophylaxis  d/c home today  spoke to pt in detail  all questions answered

## 2019-01-30 NOTE — DISCHARGE NOTE ADULT - PATIENT PORTAL LINK FT
You can access the [facility], offered by [facility], by registering with the following website: [email or web address]

## 2019-01-30 NOTE — PROGRESS NOTE ADULT - SUBJECTIVE AND OBJECTIVE BOX
Chief Complaint:  Patient is a 78y old Female who presents with a chief complaint of fever and headache for 19 days (2019 13:24)      Interval Events:   s/p TA biopsy  Feels well  complains of insomnia  asking to go home    Allergies:  amoxicillin (Unknown)  aspirin (Other (Severe))  cephalexin (Unknown)  Cipro (Unknown)  naproxen (Unknown)  penicillin V potassium (Unknown)  sulfonamides (Unknown)      MEDICATIONS  (STANDING):  chlorhexidine 4% Liquid 1 Application(s) Topical <User Schedule>  heparin  Injectable 5000 Unit(s) SubCutaneous every 8 hours  predniSONE   Tablet 60 milliGRAM(s) Oral daily  sotalol 80 milliGRAM(s) Oral every 12 hours    MEDICATIONS  (PRN):  acetaminophen   Tablet .. 650 milliGRAM(s) Oral every 6 hours PRN Temp greater or equal to 38C (100.4F)          PMHX/PSHX:    HTN (hypertension)  Atrial fibrillation    ROS:   As mentioned below      PHYSICAL EXAM:   Vital Signs Last 24 Hrs  T(C): 35.8 (2019 05:53), Max: 36.4 (2019 14:00)  T(F): 96.5 (2019 05:53), Max: 97.6 (2019 19:27)  HR: 70 (2019 05:53) (67 - 86)  BP: 152/67 (2019 05:53) (126/58 - 152/67)  BP(mean): --  RR: 16 (2019 05:53) (16 - 16)  SpO2: --    GENERAL:  NAD  HEENT:  NC/AT,  No Thyromegaly  CHEST:  CTA B/L  HEART:  S1, S2- No M, R, G  ABDOMEN:  Soft, NT, ND  EXTEREMITIES:  no cyanosis,clubbing or edema  SKIN:  NAD, dressing to Left temple  NEURO:  Grossly Nr.    LABS:     QUANTIFERON NEGATIVE                        10.4   4.33  )-----------( 285      ( 2019 07:54 )             31.2         138  |  98  |  12  ----------------------------<  132<H>  3.8   |  32  |  <0.5<L>    Ca    8.8      2019 07:54    TPro  6.1  /  Alb  3.4<L>  /  TBili  <0.2  /  DBili  x   /  AST  13  /  ALT  14  /  AlkPhos  71      LIVER FUNCTIONS - ( 2019 07:54 )  Alb: 3.4 g/dL / Pro: 6.1 g/dL / ALK PHOS: 71 U/L / ALT: 14 U/L / AST: 13 U/L / GGT: x           PT/INR - ( 2019 16:00 )   PT: 14.00 sec;   INR: 1.30 ratio         PTT - ( 2019 16:00 )  PTT:30.5 sec  Urinalysis Basic - ( 2019 16:13 )    Color: Yellow / Appearance: Clear / S.010 / pH: x  Gluc: x / Ketone: Negative  / Bili: Negative / Urobili: 0.2 mg/dL   Blood: x / Protein: Negative mg/dL / Nitrite: Negative   Leuk Esterase: Negative / RBC: 6-10 /HPF / WBC 3-5 /HPF   Sq Epi: x / Non Sq Epi: Occasional /HPF / Bacteria: Few

## 2019-01-30 NOTE — DISCHARGE NOTE ADULT - MEDICATION SUMMARY - MEDICATIONS TO TAKE
I will START or STAY ON the medications listed below when I get home from the hospital:    predniSONE 20 mg oral tablet  -- 3 tab(s) by mouth once a day  -- Indication: For Temporal arteritis    acetaminophen 325 mg oral tablet  -- 2 tab(s) by mouth every 6 hours, As needed, Temp greater or equal to 38C (100.4F)  -- Indication: For Temporal arteritis    sotalol 80 mg oral tablet  -- 1 tab(s) by mouth 2 times a day  -- Indication: For Atrial fibrillation, unspecified type    Xarelto 20 mg oral tablet  -- 1 tab(s) by mouth once a day (in the evening)  start on 1/31/19  -- Indication: For Atrial fibrillation, unspecified type    Prolia 60 mg/mL subcutaneous solution  -- subcutaneous yearly  -- Indication: For Home medication    Protonix 40 mg oral delayed release tablet  -- 1 tab(s) by mouth once a day   -- It is very important that you take or use this exactly as directed.  Do not skip doses or discontinue unless directed by your doctor.  Obtain medical advice before taking any non-prescription drugs as some may affect the action of this medication.  Swallow whole.  Do not crush.    -- Indication: For protects stomach while on steroids

## 2019-01-30 NOTE — DISCHARGE NOTE ADULT - CARE PROVIDERS DIRECT ADDRESSES
[email or web address]_Unknown

## 2019-01-30 NOTE — DISCHARGE NOTE ADULT - CARE PROVIDER_API CALL
Himanshu Tellez)  Neurology  [address], [address]  [place] [address]  Phone: [phone]  Fax: [phone]    Graciela Costello)  Rheumatology  [address], [address]  [place] [address]  Phone: [phone]  Fax: [phone]    Vincent Jones)  Surgery; Vascular Surgery  [address] [place] [address], [address]  [place] [address]  Phone: [phone]  Fax: [phone]    opthalmology,   Phone: (   )    -  Fax: (   )    -

## 2019-01-30 NOTE — DISCHARGE NOTE ADULT - PLAN OF CARE
manage symptoms follow up biopsy results  follow up with rheumatology/neurology  take medication as prescribed take mediation as prescribed take medication as prescribed follow up with Dr. Calixto follow up biopsy results  follow up with rheumatology/neurology/opthalmology  take medication as prescribed - you need to take a long prednisone taper starting with prednisone 60mg once a day for 2 weeks followed by a tapering dose.  Please follow up with the physicians listed above for  further prescriptions. take mediation as prescribed- sotalol, xarelto chronic granuloma

## 2019-01-30 NOTE — DISCHARGE NOTE ADULT - MEDICATION SUMMARY - MEDICATIONS TO STOP TAKING
I will STOP taking the medications listed below when I get home from the hospital:    doxycycline monohydrate 100 mg oral capsule  -- 1 cap(s) by mouth 2 times a day

## 2019-01-30 NOTE — PROGRESS NOTE ADULT - SUBJECTIVE AND OBJECTIVE BOX
Neurology Follow up note    Name  GRACIELA GUEVARA    HPI:  79yo female, recently started on oral doxycycline for sinusitis but subsequently found to have RUL opacity on CXR is referred to the ER due to episodic fevers up to 101.1 for 19 days,  and left sided headache in addition to above. Patient tells me she gets night sweats along with stiff neck present only in the morning. Denies weight loss but she also has a non-productive cough. Fevers are controlled with Tylenol (25 Jan 2019 20:47)      Interval History:    patient is doing well d/p TA BX  PAIN IS IMPROVED      Vital Signs Last 24 Hrs  T(C): 35.8 (30 Jan 2019 05:53), Max: 36.6 (29 Jan 2019 09:46)  T(F): 96.5 (30 Jan 2019 05:53), Max: 97.8 (29 Jan 2019 09:46)  HR: 70 (30 Jan 2019 05:53) (58 - 86)  BP: 152/67 (30 Jan 2019 05:53) (126/58 - 156/70)  BP(mean): --  RR: 16 (30 Jan 2019 05:53) (12 - 20)  SpO2: 100% (29 Jan 2019 09:46) (100% - 100%)    Neurological Exam:   Mental status: Awake, alert and oriented x3.  Recent and remote memory intact.  Naming, repetition and comprehension intact.  Attention/concentration intact.  No dysarthria, no aphasia.  Fund of knowledge appropriate.    Cranial nerves: Pupils equally round and reactive to light, visual fields full, no nystagmus, extraocular muscles intact, V1 through V3 intact bilaterally and symmetric, face symmetric, hearing intact to finger rub, palate elevation symmetric, tongue was midline.  Motor:  MRC grading 5/5 b/l UE/LE.   strength 5/5.  Normal tone and bulk.  No abnormal movements.    Sensation: Intact to light touch, proprioception, and pinprick.   Coordination: No dysmetria on finger-to-nose and heel-to-shin.  No dysdiadokinesia.  Reflexes: 2+ in bilateral UE/LE, downgoing toes bilaterally. (-) Novak.      Medications  acetaminophen   Tablet .. 650 milliGRAM(s) Oral every 6 hours PRN  chlorhexidine 4% Liquid 1 Application(s) Topical <User Schedule>  heparin  Injectable 5000 Unit(s) SubCutaneous every 8 hours  predniSONE   Tablet 60 milliGRAM(s) Oral daily  sotalol 80 milliGRAM(s) Oral every 12 hours      Lab  01-28    133<L>  |  93<L>  |  10  ----------------------------<  110<H>  4.3   |  28  |  0.6<L>    Ca    8.8      28 Jan 2019 15:42                            10.8   5.81  )-----------( 310      ( 28 Jan 2019 15:42 )             33.0               Radiology      Assessment:    Plan: TEMPORAL ARTERITIS  S/P BX  CONT PRED 60 MG QD AND F/U WITH DR MAHER IN 2 WEEKS  PLEASE CALL WITH ANY QUESTIONS

## 2019-01-31 LAB
CULTURE RESULTS: SIGNIFICANT CHANGE UP
CULTURE RESULTS: SIGNIFICANT CHANGE UP
SPECIMEN SOURCE: SIGNIFICANT CHANGE UP
SPECIMEN SOURCE: SIGNIFICANT CHANGE UP

## 2019-01-31 RX ORDER — RIVAROXABAN 15 MG-20MG
1 KIT ORAL
Qty: 0 | Refills: 0 | DISCHARGE
Start: 2019-01-31

## 2019-02-01 LAB — SURGICAL PATHOLOGY STUDY: SIGNIFICANT CHANGE UP

## 2019-02-07 DIAGNOSIS — Z87.891 PERSONAL HISTORY OF NICOTINE DEPENDENCE: ICD-10-CM

## 2019-02-07 DIAGNOSIS — J32.9 CHRONIC SINUSITIS, UNSPECIFIED: ICD-10-CM

## 2019-02-07 DIAGNOSIS — Z71.6 TOBACCO ABUSE COUNSELING: ICD-10-CM

## 2019-02-07 DIAGNOSIS — M31.6 OTHER GIANT CELL ARTERITIS: ICD-10-CM

## 2019-02-07 DIAGNOSIS — Z79.01 LONG TERM (CURRENT) USE OF ANTICOAGULANTS: ICD-10-CM

## 2019-02-07 DIAGNOSIS — I48.91 UNSPECIFIED ATRIAL FIBRILLATION: ICD-10-CM

## 2019-02-07 DIAGNOSIS — I10 ESSENTIAL (PRIMARY) HYPERTENSION: ICD-10-CM

## 2019-02-13 ENCOUNTER — APPOINTMENT (OUTPATIENT)
Dept: VASCULAR SURGERY | Facility: CLINIC | Age: 79
End: 2019-02-13
Payer: MEDICARE

## 2019-02-13 VITALS
OXYGEN SATURATION: 98 % | WEIGHT: 132 LBS | HEIGHT: 62 IN | DIASTOLIC BLOOD PRESSURE: 60 MMHG | SYSTOLIC BLOOD PRESSURE: 120 MMHG | HEART RATE: 72 BPM | BODY MASS INDEX: 24.29 KG/M2

## 2019-02-13 PROCEDURE — 99212 OFFICE O/P EST SF 10 MIN: CPT

## 2019-02-13 RX ORDER — HALOBETASOL PROPIONATE 0.5 MG/G
0.05 CREAM TOPICAL
Qty: 50 | Refills: 0 | Status: COMPLETED | COMMUNITY
Start: 2017-11-29 | End: 2019-02-13

## 2019-02-13 NOTE — CONSULT LETTER
[Dear  ___] : Dear  [unfilled], [Consult Letter:] : I had the pleasure of evaluating your patient, [unfilled]. [Please see my note below.] : Please see my note below. [FreeTextEntry2] : Dear Dr. Theresa Mejia\par Dear Dr. Nabeel Lyles\par Dear Dr. Tung Calixto

## 2019-02-13 NOTE — HISTORY OF PRESENT ILLNESS
[FreeTextEntry1] : 78 years old female s/p left temporal artery biopsy on 01/29/19 for left temporal headache and elevated ESR and the results came back positive. She is on 60 mg prednisone and is being followed by Rheumatology. No complaints regarding biopsy. The incision is healed well, no pain or drainage.

## 2019-02-13 NOTE — REASON FOR VISIT
[Post Op: _________] : a [unfilled] post op visit [FreeTextEntry1] : s/p left temporal artery biopsy 01/29/19

## 2019-02-13 NOTE — ASSESSMENT
[FreeTextEntry1] : 78 years old female s/p left temporal artery biopsy on 01/29/19 for left temporal headache and elevated ESR and the results came back positive for temporal arteritis. She is on 60 mg prednisone and is being followed by Rheumatology. No complaints regarding biopsy. The incision is healed well, no pain or drainage, doing well and happy with the care. She will continue f/u with Rheumatology and will f/u on as needed basis with me.

## 2019-03-18 ENCOUNTER — OUTPATIENT (OUTPATIENT)
Dept: OUTPATIENT SERVICES | Facility: HOSPITAL | Age: 79
LOS: 1 days | Discharge: HOME | End: 2019-03-18

## 2019-03-18 ENCOUNTER — APPOINTMENT (OUTPATIENT)
Dept: HEMATOLOGY ONCOLOGY | Facility: CLINIC | Age: 79
End: 2019-03-18

## 2019-03-18 VITALS
HEART RATE: 67 BPM | DIASTOLIC BLOOD PRESSURE: 62 MMHG | HEIGHT: 62 IN | RESPIRATION RATE: 14 BRPM | SYSTOLIC BLOOD PRESSURE: 135 MMHG | BODY MASS INDEX: 25.03 KG/M2 | WEIGHT: 136 LBS | TEMPERATURE: 96.9 F

## 2019-03-18 DIAGNOSIS — D72.819 DECREASED WHITE BLOOD CELL COUNT, UNSPECIFIED: ICD-10-CM

## 2019-03-19 NOTE — ASSESSMENT
[FreeTextEntry1] : 78 year old WF with history of mild leukopenia, chronic and asymptomatic. Found to have temporal arteritis, now on prednisone. \par \par PLAN:\par Temporal Arteritis:\par - Continue with prednisone and Actemra. \par - WIll follow with Dr Mejia- Rheumatology. \par - Headache, blurry vision resolved still has some jaw claudication. \par \par Mild Leukopenia:\par - Chronic, unchanged on most recent CBC.\par \par Normocytic Anemia:\par - Likely secondary to Temporal Arteritis.\par - Will monitor and advise repeating CBC in 3 months. \par \par \par The patient was seen and examined by Dr Pérez, who agreed with the above assessment and plan. \par \par

## 2019-03-19 NOTE — CONSULT LETTER
[Courtesy Letter:] : I had the pleasure of seeing your patient, [unfilled], in my office today. [Please see my note below.] : Please see my note below. [Sincerely,] : Sincerely, [Dear  ___] : Dear ~FELIPE, [Consult Closing:] : Thank you very much for allowing me to participate in the care of this patient.  If you have any questions, please do not hesitate to contact me. [FreeTextEntry2] : Dr. AUSTEN Santana [FreeTextEntry3] : Kerry Pérez MD

## 2019-03-19 NOTE — HISTORY OF PRESENT ILLNESS
[de-identified] : The patient is coming for her regularly scheduled yearly followup for her mild leukopenia.  She denies any symptoms today. Denies fever, chills or rigors. Denies Abdominal pain, Nausea or vomiting. She is on Prolia for osteoporosis.  She is up to date with screenings including mammogram and colonoscopy.  At the present time, she is denying any night sweats, cough, or shortness of breath.  Appetite seems to be stable.  No problems with urine or bowel movements.\par  [de-identified] : 3/18/19\par Patient was having fevers 99-101F with scalp pain and tenderness on the right side; was treated for sinus infection. Had CT Head which was negative, had a CXR which had right upper lung scarring, which was chronic. The patient also had blurring of vision which is now resolved after the diagnosis of her condition made and started treatment. \par Admitted to the hospital 1/25/19, she was diagnosed with temporal arteritis. She has been on prednisone 60 mg, which will be lowered to 40 mg tomorrow. \par Starting on 3/9 she is on Actemra- every two weeks- had one dose already.\par  --> 120 --> 75 --> 32 --> 69 -->28.\par Patient is having issues with insomnia since starting the prednisone. However, her headache is resolved.\par Complains of fatigue and difficulty sleeping. \par Otherwise, denying any infectious episodes.

## 2019-03-25 DIAGNOSIS — D72.819 DECREASED WHITE BLOOD CELL COUNT, UNSPECIFIED: ICD-10-CM

## 2019-05-06 ENCOUNTER — TRANSCRIPTION ENCOUNTER (OUTPATIENT)
Age: 79
End: 2019-05-06

## 2019-06-13 ENCOUNTER — APPOINTMENT (OUTPATIENT)
Dept: CARDIOLOGY | Facility: CLINIC | Age: 79
End: 2019-06-13
Payer: MEDICARE

## 2019-06-13 PROCEDURE — 99214 OFFICE O/P EST MOD 30 MIN: CPT

## 2019-06-13 PROCEDURE — 93000 ELECTROCARDIOGRAM COMPLETE: CPT

## 2019-06-14 ENCOUNTER — APPOINTMENT (OUTPATIENT)
Dept: CARDIOLOGY | Facility: CLINIC | Age: 79
End: 2019-06-14

## 2019-06-21 ENCOUNTER — APPOINTMENT (OUTPATIENT)
Dept: CARDIOLOGY | Facility: CLINIC | Age: 79
End: 2019-06-21
Payer: MEDICARE

## 2019-06-21 PROCEDURE — 93978 VASCULAR STUDY: CPT

## 2019-10-05 NOTE — PHYSICAL EXAM
Patient here with mom for flu/menactra immunizations. VIS forms given and reviewed. No questions voiced . Vaccines given.  Patient and mom remained in exam room post injections. Tolerated injections.   Mom and patient discharged and escorted to waiting room.   [de-identified] : Left temporal scar healed well, no erythema or drainage

## 2019-10-15 ENCOUNTER — OUTPATIENT (OUTPATIENT)
Dept: OUTPATIENT SERVICES | Facility: HOSPITAL | Age: 79
LOS: 1 days | Discharge: HOME | End: 2019-10-15
Payer: MEDICARE

## 2019-10-15 DIAGNOSIS — R91.8 OTHER NONSPECIFIC ABNORMAL FINDING OF LUNG FIELD: ICD-10-CM

## 2019-10-15 PROCEDURE — 71250 CT THORAX DX C-: CPT | Mod: 26

## 2019-12-03 ENCOUNTER — OUTPATIENT (OUTPATIENT)
Dept: OUTPATIENT SERVICES | Facility: HOSPITAL | Age: 79
LOS: 1 days | Discharge: HOME | End: 2019-12-03

## 2019-12-04 DIAGNOSIS — Z09 ENCOUNTER FOR FOLLOW-UP EXAMINATION AFTER COMPLETED TREATMENT FOR CONDITIONS OTHER THAN MALIGNANT NEOPLASM: ICD-10-CM

## 2019-12-04 DIAGNOSIS — M81.0 AGE-RELATED OSTEOPOROSIS WITHOUT CURRENT PATHOLOGICAL FRACTURE: ICD-10-CM

## 2019-12-04 DIAGNOSIS — Z87.310 PERSONAL HISTORY OF (HEALED) OSTEOPOROSIS FRACTURE: ICD-10-CM

## 2019-12-04 DIAGNOSIS — Z78.0 ASYMPTOMATIC MENOPAUSAL STATE: ICD-10-CM

## 2019-12-04 DIAGNOSIS — Z82.62 FAMILY HISTORY OF OSTEOPOROSIS: ICD-10-CM

## 2019-12-09 ENCOUNTER — APPOINTMENT (OUTPATIENT)
Dept: CARDIOLOGY | Facility: CLINIC | Age: 79
End: 2019-12-09
Payer: MEDICARE

## 2019-12-09 PROCEDURE — 93306 TTE W/DOPPLER COMPLETE: CPT

## 2019-12-19 ENCOUNTER — APPOINTMENT (OUTPATIENT)
Dept: CARDIOLOGY | Facility: CLINIC | Age: 79
End: 2019-12-19
Payer: MEDICARE

## 2019-12-19 PROCEDURE — 93000 ELECTROCARDIOGRAM COMPLETE: CPT

## 2019-12-19 PROCEDURE — 99214 OFFICE O/P EST MOD 30 MIN: CPT

## 2020-06-01 ENCOUNTER — LABORATORY RESULT (OUTPATIENT)
Age: 80
End: 2020-06-01

## 2020-06-01 ENCOUNTER — APPOINTMENT (OUTPATIENT)
Dept: HEMATOLOGY ONCOLOGY | Facility: CLINIC | Age: 80
End: 2020-06-01
Payer: MEDICARE

## 2020-06-01 ENCOUNTER — OUTPATIENT (OUTPATIENT)
Dept: OUTPATIENT SERVICES | Facility: HOSPITAL | Age: 80
LOS: 1 days | Discharge: HOME | End: 2020-06-01

## 2020-06-01 VITALS
SYSTOLIC BLOOD PRESSURE: 134 MMHG | RESPIRATION RATE: 14 BRPM | WEIGHT: 138 LBS | HEART RATE: 67 BPM | HEIGHT: 62 IN | DIASTOLIC BLOOD PRESSURE: 63 MMHG | BODY MASS INDEX: 25.4 KG/M2 | TEMPERATURE: 97.8 F

## 2020-06-01 DIAGNOSIS — Z63.5 DISRUPTION OF FAMILY BY SEPARATION AND DIVORCE: ICD-10-CM

## 2020-06-01 LAB
HCT VFR BLD CALC: 35.3 %
HGB BLD-MCNC: 11.5 G/DL
MCHC RBC-ENTMCNC: 30.8 PG
MCHC RBC-ENTMCNC: 32.6 G/DL
MCV RBC AUTO: 94.6 FL
PLATELET # BLD AUTO: 240 K/UL
PMV BLD: 8.9 FL
RBC # BLD: 3.73 M/UL
RBC # FLD: 12.5 %
WBC # FLD AUTO: 4.46 K/UL

## 2020-06-01 PROCEDURE — 99213 OFFICE O/P EST LOW 20 MIN: CPT

## 2020-06-01 SDOH — SOCIAL STABILITY - SOCIAL INSECURITY: DISRUPTION OF FAMILY BY SEPARATION AND DIVORCE: Z63.5

## 2020-06-02 PROBLEM — Z63.5 DIVORCED: Status: ACTIVE | Noted: 2020-06-02

## 2020-06-02 LAB
ALBUMIN SERPL ELPH-MCNC: 3.9 G/DL
ALP BLD-CCNC: 64 U/L
ALT SERPL-CCNC: 12 U/L
ANION GAP SERPL CALC-SCNC: 11 MMOL/L
AST SERPL-CCNC: 17 U/L
BILIRUB SERPL-MCNC: <0.2 MG/DL
BUN SERPL-MCNC: 16 MG/DL
CALCIUM SERPL-MCNC: 9 MG/DL
CHLORIDE SERPL-SCNC: 97 MMOL/L
CO2 SERPL-SCNC: 27 MMOL/L
CREAT SERPL-MCNC: 0.6 MG/DL
GLUCOSE SERPL-MCNC: 85 MG/DL
LDH SERPL-CCNC: 136
POTASSIUM SERPL-SCNC: 4.2 MMOL/L
PROT SERPL-MCNC: 6.3 G/DL
SODIUM SERPL-SCNC: 135 MMOL/L

## 2020-06-02 NOTE — PHYSICAL EXAM
[Fully active, able to carry on all pre-disease performance without restriction] : Status 0 - Fully active, able to carry on all pre-disease performance without restriction [Normal] : affect appropriate [de-identified] : Arthritic changes noted.

## 2020-06-02 NOTE — HISTORY OF PRESENT ILLNESS
[Disease:__________________________] : Disease: [unfilled] [de-identified] : The patient is coming for her regularly scheduled yearly follow up for her chronic, mild leukopenia of many years.\par The patient is denying any new particular complaints. She has done quite well with the medications for her temporal arteritis and is off the medications.\par No new fever or night sweats. The appetite is stable. No headache or dizziness. No problems with urine or bowel movements. No infectious episodes.\par She states that she is up to date with her age appropriate screenings.\par She is living alone.

## 2020-06-02 NOTE — ASSESSMENT
[FreeTextEntry1] : 1. Mild leukopenia of many years, clinically asymptomatic.\par 2. Temporal arteritis: controlled.\par \par We will proceed with obtaining a CBC, CMP, LDH.\par If they're all within acceptable limits, she will be seen again in a year for follow up and as needed.\par \par All questions answered.

## 2020-06-03 DIAGNOSIS — D72.819 DECREASED WHITE BLOOD CELL COUNT, UNSPECIFIED: ICD-10-CM

## 2020-06-25 ENCOUNTER — APPOINTMENT (OUTPATIENT)
Dept: CARDIOLOGY | Facility: CLINIC | Age: 80
End: 2020-06-25
Payer: MEDICARE

## 2020-06-25 PROCEDURE — 99214 OFFICE O/P EST MOD 30 MIN: CPT

## 2020-06-25 PROCEDURE — 93000 ELECTROCARDIOGRAM COMPLETE: CPT

## 2020-10-28 ENCOUNTER — NON-APPOINTMENT (OUTPATIENT)
Age: 80
End: 2020-10-28

## 2020-10-28 DIAGNOSIS — Z86.79 PERSONAL HISTORY OF OTHER DISEASES OF THE CIRCULATORY SYSTEM: ICD-10-CM

## 2020-10-30 ENCOUNTER — OUTPATIENT (OUTPATIENT)
Dept: OUTPATIENT SERVICES | Facility: HOSPITAL | Age: 80
LOS: 1 days | Discharge: HOME | End: 2020-10-30
Payer: MEDICARE

## 2020-10-30 DIAGNOSIS — R91.1 SOLITARY PULMONARY NODULE: ICD-10-CM

## 2020-10-30 PROCEDURE — 71250 CT THORAX DX C-: CPT | Mod: 26

## 2020-12-02 ENCOUNTER — RX RENEWAL (OUTPATIENT)
Age: 80
End: 2020-12-02

## 2021-01-08 ENCOUNTER — APPOINTMENT (OUTPATIENT)
Dept: CARDIOLOGY | Facility: CLINIC | Age: 81
End: 2021-01-08
Payer: MEDICARE

## 2021-01-08 PROCEDURE — 93306 TTE W/DOPPLER COMPLETE: CPT

## 2021-01-14 ENCOUNTER — APPOINTMENT (OUTPATIENT)
Dept: CARDIOLOGY | Facility: CLINIC | Age: 81
End: 2021-01-14
Payer: MEDICARE

## 2021-01-14 VITALS
BODY MASS INDEX: 25.03 KG/M2 | WEIGHT: 136 LBS | DIASTOLIC BLOOD PRESSURE: 68 MMHG | HEART RATE: 59 BPM | SYSTOLIC BLOOD PRESSURE: 116 MMHG | HEIGHT: 62 IN | TEMPERATURE: 96.7 F

## 2021-01-14 PROCEDURE — 99214 OFFICE O/P EST MOD 30 MIN: CPT

## 2021-01-14 PROCEDURE — 93000 ELECTROCARDIOGRAM COMPLETE: CPT

## 2021-03-11 ENCOUNTER — APPOINTMENT (OUTPATIENT)
Dept: OBGYN | Facility: CLINIC | Age: 81
End: 2021-03-11

## 2021-06-07 ENCOUNTER — LABORATORY RESULT (OUTPATIENT)
Age: 81
End: 2021-06-07

## 2021-06-07 ENCOUNTER — APPOINTMENT (OUTPATIENT)
Dept: HEMATOLOGY ONCOLOGY | Facility: CLINIC | Age: 81
End: 2021-06-07
Payer: MEDICARE

## 2021-06-07 ENCOUNTER — OUTPATIENT (OUTPATIENT)
Dept: OUTPATIENT SERVICES | Facility: HOSPITAL | Age: 81
LOS: 1 days | Discharge: HOME | End: 2021-06-07

## 2021-06-07 VITALS
DIASTOLIC BLOOD PRESSURE: 59 MMHG | BODY MASS INDEX: 25.03 KG/M2 | TEMPERATURE: 97.3 F | HEART RATE: 65 BPM | SYSTOLIC BLOOD PRESSURE: 119 MMHG | HEIGHT: 62 IN | WEIGHT: 136 LBS | RESPIRATION RATE: 18 BRPM

## 2021-06-07 PROCEDURE — 99213 OFFICE O/P EST LOW 20 MIN: CPT

## 2021-06-08 LAB
HCT VFR BLD CALC: 36.6 %
HGB BLD-MCNC: 12.4 G/DL
MCHC RBC-ENTMCNC: 31.6 PG
MCHC RBC-ENTMCNC: 33.9 G/DL
MCV RBC AUTO: 93.4 FL
PLATELET # BLD AUTO: 162 K/UL
PMV BLD: 10.2 FL
RBC # BLD: 3.92 M/UL
RBC # FLD: 12.9 %
WBC # FLD AUTO: 4.55 K/UL

## 2021-06-08 NOTE — PHYSICAL EXAM
[Fully active, able to carry on all pre-disease performance without restriction] : Status 0 - Fully active, able to carry on all pre-disease performance without restriction [Normal] : affect appropriate [de-identified] : Some arthritic changes as before

## 2021-06-08 NOTE — HISTORY OF PRESENT ILLNESS
[Disease:__________________________] : Disease: [unfilled] [de-identified] : The patient is coming for her yearly follow up upon the recommendation of her primary care physician for her leukopenia.\par Since her last visit about a year ago, she was diagnosed with temporal arteritis and being treated accordingly. She is on Actemra, Prolia and Sotolol in addition to Xarelto.\par She gives history of some bruisability, which is not surprising since she is on anticoagulation, She is denying any repeated infections. She has no SOB. She is up to date with all her screenings except that she is due for colonoscopy. She has seasonal allergies which give her itchy throat.\par Further questioning reveals that occasionally she has back pain due to her disk disease.

## 2021-06-08 NOTE — ASSESSMENT
[FreeTextEntry1] : Mild leukopenia, chronic for many years, clinically asymptomatic.\par \par We will repeat the CBC. If stable, the patient may follow up in a year and as needed by referral of her managing, primary care physician.\par The patient will keep all her appointments with all her other physicians and also not to forger proceeding with her colonoscopy.\par The patient was otherwise reassured from hematological standpoint.\par \par All questions answered.\par \par F/U in a year and as needed.

## 2021-06-08 NOTE — CONSULT LETTER
[Dear  ___] : Dear ~FELIPE, [Courtesy Letter:] : I had the pleasure of seeing your patient, [unfilled], in my office today. [Please see my note below.] : Please see my note below. [Consult Closing:] : Thank you very much for allowing me to participate in the care of this patient.  If you have any questions, please do not hesitate to contact me. [Sincerely,] : Sincerely, [FreeTextEntry2] : Dr. Shorty Santana [FreeTextEntry3] : Dr. ETIENNE Pérez

## 2021-06-08 NOTE — REVIEW OF SYSTEMS
[Fatigue] : fatigue [Insomnia] : insomnia [Easy Bruising] : a tendency for easy bruising [Negative] : Integumentary

## 2021-06-14 DIAGNOSIS — D72.819 DECREASED WHITE BLOOD CELL COUNT, UNSPECIFIED: ICD-10-CM

## 2021-07-12 ENCOUNTER — APPOINTMENT (OUTPATIENT)
Dept: HEMATOLOGY ONCOLOGY | Facility: CLINIC | Age: 81
End: 2021-07-12

## 2021-07-15 ENCOUNTER — RESULT CHARGE (OUTPATIENT)
Age: 81
End: 2021-07-15

## 2021-07-15 ENCOUNTER — APPOINTMENT (OUTPATIENT)
Dept: CARDIOLOGY | Facility: CLINIC | Age: 81
End: 2021-07-15
Payer: MEDICARE

## 2021-07-15 VITALS
HEART RATE: 59 BPM | WEIGHT: 137 LBS | BODY MASS INDEX: 25.21 KG/M2 | SYSTOLIC BLOOD PRESSURE: 110 MMHG | TEMPERATURE: 98.1 F | DIASTOLIC BLOOD PRESSURE: 64 MMHG | HEIGHT: 62 IN

## 2021-07-15 PROCEDURE — 93000 ELECTROCARDIOGRAM COMPLETE: CPT

## 2021-07-15 PROCEDURE — 99213 OFFICE O/P EST LOW 20 MIN: CPT

## 2021-07-15 RX ORDER — PREDNISONE 10 MG/1
10 TABLET ORAL
Refills: 0 | Status: COMPLETED | COMMUNITY
End: 2021-07-15

## 2021-07-15 RX ORDER — PANTOPRAZOLE 40 MG/1
40 TABLET, DELAYED RELEASE ORAL
Refills: 0 | Status: COMPLETED | COMMUNITY
End: 2021-07-15

## 2021-07-30 ENCOUNTER — RX RENEWAL (OUTPATIENT)
Age: 81
End: 2021-07-30

## 2021-10-18 ENCOUNTER — APPOINTMENT (OUTPATIENT)
Dept: OBGYN | Facility: CLINIC | Age: 81
End: 2021-10-18

## 2021-10-18 ENCOUNTER — RESULT CHARGE (OUTPATIENT)
Age: 81
End: 2021-10-18

## 2021-10-18 VITALS
BODY MASS INDEX: 25.21 KG/M2 | SYSTOLIC BLOOD PRESSURE: 139 MMHG | TEMPERATURE: 97.9 F | WEIGHT: 137 LBS | HEART RATE: 61 BPM | HEIGHT: 62 IN | DIASTOLIC BLOOD PRESSURE: 74 MMHG

## 2021-10-18 DIAGNOSIS — Z78.9 OTHER SPECIFIED HEALTH STATUS: ICD-10-CM

## 2021-10-18 DIAGNOSIS — Z78.0 ASYMPTOMATIC MENOPAUSAL STATE: ICD-10-CM

## 2021-10-18 DIAGNOSIS — M31.6 OTHER GIANT CELL ARTERITIS: ICD-10-CM

## 2021-10-18 DIAGNOSIS — Z82.49 FAMILY HISTORY OF ISCHEMIC HEART DISEASE AND OTHER DISEASES OF THE CIRCULATORY SYSTEM: ICD-10-CM

## 2021-10-18 DIAGNOSIS — Z82.62 FAMILY HISTORY OF OSTEOPOROSIS: ICD-10-CM

## 2021-10-18 LAB
BILIRUB UR QL STRIP: NORMAL
CLARITY UR: CLEAR
COLLECTION METHOD: NORMAL
GLUCOSE UR-MCNC: NORMAL
HCG UR QL: 0.2 EU/DL
HGB UR QL STRIP.AUTO: ABNORMAL
KETONES UR-MCNC: NORMAL
LEUKOCYTE ESTERASE UR QL STRIP: NORMAL
NITRITE UR QL STRIP: NORMAL
PH UR STRIP: 5
PROT UR STRIP-MCNC: NORMAL
SP GR UR STRIP: 1

## 2021-10-18 PROCEDURE — 81003 URINALYSIS AUTO W/O SCOPE: CPT | Mod: QW

## 2021-10-18 PROCEDURE — G0101: CPT

## 2021-10-20 LAB — BACTERIA UR CULT: NORMAL

## 2021-11-02 ENCOUNTER — OUTPATIENT (OUTPATIENT)
Dept: OUTPATIENT SERVICES | Facility: HOSPITAL | Age: 81
LOS: 1 days | Discharge: HOME | End: 2021-11-02
Payer: MEDICARE

## 2021-11-02 DIAGNOSIS — R91.1 SOLITARY PULMONARY NODULE: ICD-10-CM

## 2021-11-02 PROCEDURE — 71250 CT THORAX DX C-: CPT | Mod: 26,MH

## 2021-11-26 ENCOUNTER — RX RENEWAL (OUTPATIENT)
Age: 81
End: 2021-11-26

## 2021-12-14 ENCOUNTER — OUTPATIENT (OUTPATIENT)
Dept: OUTPATIENT SERVICES | Facility: HOSPITAL | Age: 81
LOS: 1 days | Discharge: HOME | End: 2021-12-14

## 2021-12-15 DIAGNOSIS — Z78.0 ASYMPTOMATIC MENOPAUSAL STATE: ICD-10-CM

## 2021-12-15 DIAGNOSIS — Z82.62 FAMILY HISTORY OF OSTEOPOROSIS: ICD-10-CM

## 2021-12-15 DIAGNOSIS — M81.0 AGE-RELATED OSTEOPOROSIS WITHOUT CURRENT PATHOLOGICAL FRACTURE: ICD-10-CM

## 2021-12-15 DIAGNOSIS — Z09 ENCOUNTER FOR FOLLOW-UP EXAMINATION AFTER COMPLETED TREATMENT FOR CONDITIONS OTHER THAN MALIGNANT NEOPLASM: ICD-10-CM

## 2021-12-15 DIAGNOSIS — Z87.310 PERSONAL HISTORY OF (HEALED) OSTEOPOROSIS FRACTURE: ICD-10-CM

## 2022-01-14 ENCOUNTER — APPOINTMENT (OUTPATIENT)
Dept: CARDIOLOGY | Facility: CLINIC | Age: 82
End: 2022-01-14
Payer: MEDICARE

## 2022-01-14 PROCEDURE — 93306 TTE W/DOPPLER COMPLETE: CPT

## 2022-01-26 ENCOUNTER — APPOINTMENT (OUTPATIENT)
Dept: CARDIOLOGY | Facility: CLINIC | Age: 82
End: 2022-01-26
Payer: MEDICARE

## 2022-01-26 VITALS
DIASTOLIC BLOOD PRESSURE: 80 MMHG | HEIGHT: 62 IN | HEART RATE: 60 BPM | WEIGHT: 132 LBS | BODY MASS INDEX: 24.29 KG/M2 | RESPIRATION RATE: 18 BRPM | OXYGEN SATURATION: 98 % | SYSTOLIC BLOOD PRESSURE: 142 MMHG

## 2022-01-26 DIAGNOSIS — Z00.00 ENCOUNTER FOR GENERAL ADULT MEDICAL EXAMINATION W/OUT ABNORMAL FINDINGS: ICD-10-CM

## 2022-01-26 PROCEDURE — 93000 ELECTROCARDIOGRAM COMPLETE: CPT

## 2022-01-26 PROCEDURE — 99213 OFFICE O/P EST LOW 20 MIN: CPT

## 2022-01-26 RX ORDER — DENOSUMAB 60 MG/ML
60 INJECTION SUBCUTANEOUS
Qty: 1 | Refills: 1 | Status: ACTIVE | COMMUNITY

## 2022-01-26 RX ORDER — TOCILIZUMAB 20 MG/ML
INJECTION, SOLUTION, CONCENTRATE INTRAVENOUS
Refills: 0 | Status: ACTIVE | COMMUNITY

## 2022-02-24 NOTE — ED ADULT NURSE NOTE - CAS EDP DISCH TYPE
Home Cyclophosphamide Pregnancy And Lactation Text: This medication is Pregnancy Category D and it isn't considered safe during pregnancy. This medication is excreted in breast milk.

## 2022-04-20 ENCOUNTER — APPOINTMENT (OUTPATIENT)
Dept: CARDIOLOGY | Facility: CLINIC | Age: 82
End: 2022-04-20
Payer: MEDICARE

## 2022-04-20 VITALS
BODY MASS INDEX: 25.06 KG/M2 | HEIGHT: 62 IN | DIASTOLIC BLOOD PRESSURE: 74 MMHG | WEIGHT: 136.2 LBS | SYSTOLIC BLOOD PRESSURE: 118 MMHG

## 2022-04-20 PROCEDURE — 99213 OFFICE O/P EST LOW 20 MIN: CPT

## 2022-04-20 PROCEDURE — 93000 ELECTROCARDIOGRAM COMPLETE: CPT

## 2022-05-27 ENCOUNTER — OUTPATIENT (OUTPATIENT)
Dept: OUTPATIENT SERVICES | Facility: HOSPITAL | Age: 82
LOS: 1 days | Discharge: HOME | End: 2022-05-27
Payer: MEDICARE

## 2022-05-27 DIAGNOSIS — R91.1 SOLITARY PULMONARY NODULE: ICD-10-CM

## 2022-05-27 PROCEDURE — 71250 CT THORAX DX C-: CPT | Mod: 26,MH

## 2022-07-13 NOTE — INPATIENT CERTIFICATION FOR MEDICARE PATIENTS - PHYSICIAN CONCUR
[facility]   Operative Note    Pre-operative diagnosis: 1. Left foot Lisfranc fracture dislocation  2. Left second metatarsal base fracture  3. Left third metatarsal base fracture  4. Left cuboid fracture   Post-operative diagnosis Same   Procedure: 1.  Open reduction internal fixation of left Lisfranc injury  2.  Left second tarsometatarsal joint arthrodesis  3.  Left third tarsometatarsal joint arthrodesis   Surgeon(s): Surgeon(s) and Role:     * Aramis Chacon MD - Primary     * Shu Diamond PA-C - Assisting, the PA was present for the entirety the case was essential for patient positioning soft tissue traction wound closure splint placement and patient transport.   Estimated blood loss: 10 mL    Specimens: * No specimens in log *   Findings:  Lisfranc fracture dislocation with comminuted fractures of the second and third tarsometatarsal joint bases     Indications: This a 44-year-old female who was seen in my clinic and was diagnosed with a left Lisfranc injury.  There was significant dislocation of the midfoot with comminution at the base of the second and third tarsometatarsal joints.  I had a long discussion with Simran regarding conservative versus surgical management.  Based on her age and function I am recommending surgical management.  Of note the patient is pregnant and we did consult with her OB/GYN doctor who recommended having the surgery performed during the second trimester.  We did arrange for her to have her surgery at the hospital where they could do fetal monitoring.  We did discuss the risks of surgery.  Her most substantial risks include wound healing problems, infection, nonunion, malunion, hardware symptoms, neurovascular injury, blood clots, blood loss, and anesthesia related complications.  After discussing these details she elected undergo the above-mentioned procedures.    Description of procedure:   The patient was met in the preoperative area and  the operative site, the left foot, signed by myself.  Preoperative antibiotics were given.  Patient was then brought back to the operating table was placed in supine position.  All bony prominences were padded at this time.  She then underwent general anesthesia.  She was then prepped and draped in normal sterile fashion.  A timeout was then called ensuring we are operating the correct site and the correct patient.  All staff concerns were addressed this time.  Following the timeout the left lower extremity was elevated and an Esmarch was used to exsanguinate the left lower extremity and a supramalleolar tourniquet was placed.    An incision was made over the dorsal aspect of the foot dissection was carried down through the skin and subcutaneous tissue.  Hemostasis was achieved.  We then retracted the extensor tendons and obtained access to the second and third tarsometatarsal joints.  There is substantial comminution at the base of the second tarsometatarsal joint and third tarsometatarsal joint.  There was lateral translation of all of those metatarsal bases.  We used a Hinchman distractor to distract the joint and we removed all cartilage.  We prepped all joints using curettes and osteotomes.  We then fenestrated any subchondral bone that was present.  We then reduced the Lisfranc joint and placed a 3.5 millimeter screw from the medial cuneiform into the base of the second metatarsal.  This translated the second metatarsal base and reduced it appropriately.  We then placed a dorsal locking plate from [place] 28 on the second TMT joint.  We compressed while drilling and through the plate.  We then placed a third tarsometatarsal joint plate and confirmed our position via fluoroscopy.  We once again compressed through the plate and well drilling.  We had excellent compression.  Prior to compressing the joint we did place some crushed cancellous bone graft within the void.  We then brought in C arm and took final  x-rays demonstrating appropriate position of the second third tarsometatarsal joints.  There is also reduction of the Lisfranc interval.  We then thoroughly irrigated out the wound took down the tourniquet and obtained hemostasis.  We then closed in layered fashion using 3-0 Monocryl 3-0 nylon.  Local anesthetic was placed.  Sterile bandages were placed on the patient she was placed into a postoperative splint.  She was transferred off of the operating table and brought back to postanesthesia care unit where she woke without difficulty.    All counts were correct at the end of the case.    Postoperative plan: The patient will be nonweightbearing on the left lower extremity for the next 6 weeks.  She will be seen in my clinic in 2 weeks for suture removal.  At that time she will be placed into a short cam walker boot.       I concur with the Admission Order and I certify that services are provided in accordance with Section 42 CFR § 412.3

## 2022-07-22 PROBLEM — Z82.49 FAMILY HISTORY OF HYPERTENSION: Status: ACTIVE | Noted: 2022-07-22

## 2022-07-22 PROBLEM — Z78.9 EXERCISES OCCASIONALLY: Status: ACTIVE | Noted: 2022-07-22

## 2022-07-22 PROBLEM — Z78.0 MENOPAUSE: Status: ACTIVE | Noted: 2022-07-22

## 2022-07-22 PROBLEM — M31.6 TEMPORAL ARTERITIS: Status: ACTIVE | Noted: 2019-03-18

## 2022-07-22 PROBLEM — Z82.62 FAMILY HISTORY OF OSTEOPOROSIS: Status: ACTIVE | Noted: 2022-07-22

## 2022-07-22 RX ORDER — MULTIVITAMIN
TABLET ORAL
Refills: 0 | Status: ACTIVE | COMMUNITY

## 2022-07-22 RX ORDER — CHROMIUM 200 MCG
TABLET ORAL
Refills: 0 | Status: ACTIVE | COMMUNITY

## 2022-07-22 RX ORDER — CALCIUM CARBONATE 600 MG
TABLET ORAL
Refills: 0 | Status: ACTIVE | COMMUNITY

## 2022-07-22 RX ORDER — ASCORBIC ACID 500 MG
TABLET ORAL
Refills: 0 | Status: ACTIVE | COMMUNITY

## 2022-07-22 NOTE — HISTORY OF PRESENT ILLNESS
[Patient reported mammogram was normal] : Patient reported mammogram was normal [Patient reported colonoscopy was normal] : Patient reported colonoscopy was normal [Menarche Age: ____] : age at menarche was [unfilled] [Patient reported PAP Smear was normal] : Patient reported PAP Smear was normal [postmenopausal] : postmenopausal [N] : Patient is not sexually active [Previously active] : previously active [Men] : men [No] : No [Patient reported bone density results were abnormal] : Patient reported bone density results were abnormal [TextBox_4] : 11-year-old para 2-0-0-2 in menopause came for annual GYN exam.  Patient denies postmenopausal bleeding, pelvic pain, discharge, dysuria or other GYN complaints.  Patient states she does have a history of abnormal Pap with HPV and a LEEP almost 30 years ago.  She states after LEEP her Paps have always been normal.  She denies history of fibroids or ovarian cysts.  Patient is not currently sexually active.\par \par U dip: Microscopic hematuria\par  [Mammogramdate] : 4-2021 [TextBox_19] : Will give referral [BoneDensityDate] : 2019 [TextBox_37] : Osteoporosis/osteopenia-will give referral [ColonoscopyDate] : 2015 [PGHxTotal] : 2 [[facility] : 2 [PGHxPremature] : 0 [PGHxAbortions] : 0 [[facility]xLiving] : 2 [FreeTextEntry1] : 2-

## 2022-07-22 NOTE — PHYSICAL EXAM
[Appropriately responsive] : appropriately responsive [Alert] : alert [No Acute Distress] : no acute distress [No Lymphadenopathy] : no lymphadenopathy [Regular Rate Rhythm] : regular rate rhythm [Soft] : soft [Non-tender] : non-tender [Non-distended] : non-distended [No Mass] : no mass [Oriented x3] : oriented x3 [Examination Of The Breasts] : a normal appearance [No Discharge] : no discharge [No Masses] : no breast masses were palpable [Vulvar Atrophy] : vulvar atrophy [No Lesions] : no lesions  [Labia Majora] : normal [Labia Minora] : normal [Normal] : normal [Atrophy] : atrophy [No Bleeding] : There was no active vaginal bleeding [Tenderness] : nontender [Enlarged ___ wks] : not enlarged [Mass ___ cm] : no uterine mass was palpated [Uterine Adnexae] : normal [FreeTextEntry5] : No lesions noted [FreeTextEntry6] : No pain elicited on bimanual exam

## 2022-07-22 NOTE — COUNSELING
[Nutrition/ Exercise/ Weight Management] : nutrition, exercise, weight management [Breast Self Exam] : breast self exam [Bladder Hygiene] : bladder hygiene [Medication Management] : medication management

## 2022-07-26 ENCOUNTER — NON-APPOINTMENT (OUTPATIENT)
Age: 82
End: 2022-07-26

## 2022-07-27 ENCOUNTER — TRANSCRIPTION ENCOUNTER (OUTPATIENT)
Age: 82
End: 2022-07-27

## 2022-08-04 ENCOUNTER — RX RENEWAL (OUTPATIENT)
Age: 82
End: 2022-08-04

## 2022-08-04 RX ORDER — RIVAROXABAN 20 MG/1
20 TABLET, FILM COATED ORAL
Qty: 90 | Refills: 3 | Status: ACTIVE | COMMUNITY
Start: 2017-05-16 | End: 1900-01-01

## 2022-08-22 ENCOUNTER — NON-APPOINTMENT (OUTPATIENT)
Age: 82
End: 2022-08-22

## 2022-09-01 ENCOUNTER — APPOINTMENT (OUTPATIENT)
Dept: HEMATOLOGY ONCOLOGY | Facility: CLINIC | Age: 82
End: 2022-09-01

## 2022-10-04 ENCOUNTER — APPOINTMENT (OUTPATIENT)
Dept: CARDIOLOGY | Facility: CLINIC | Age: 82
End: 2022-10-04

## 2022-10-04 PROCEDURE — 93880 EXTRACRANIAL BILAT STUDY: CPT

## 2022-10-09 ENCOUNTER — NON-APPOINTMENT (OUTPATIENT)
Age: 82
End: 2022-10-09

## 2022-10-11 ENCOUNTER — APPOINTMENT (OUTPATIENT)
Dept: CARDIOLOGY | Facility: CLINIC | Age: 82
End: 2022-10-11

## 2022-10-24 ENCOUNTER — APPOINTMENT (OUTPATIENT)
Dept: CARDIOLOGY | Facility: CLINIC | Age: 82
End: 2022-10-24

## 2022-10-24 VITALS
HEIGHT: 62 IN | HEART RATE: 57 BPM | WEIGHT: 139 LBS | SYSTOLIC BLOOD PRESSURE: 152 MMHG | TEMPERATURE: 97.1 F | DIASTOLIC BLOOD PRESSURE: 76 MMHG | BODY MASS INDEX: 25.58 KG/M2

## 2022-10-24 DIAGNOSIS — I65.23 OCCLUSION AND STENOSIS OF BILATERAL CAROTID ARTERIES: ICD-10-CM

## 2022-10-24 DIAGNOSIS — I35.1 NONRHEUMATIC AORTIC (VALVE) INSUFFICIENCY: ICD-10-CM

## 2022-10-24 DIAGNOSIS — Z86.79 PERSONAL HISTORY OF OTHER DISEASES OF THE CIRCULATORY SYSTEM: ICD-10-CM

## 2022-10-24 PROCEDURE — 99215 OFFICE O/P EST HI 40 MIN: CPT

## 2022-10-24 PROCEDURE — 93000 ELECTROCARDIOGRAM COMPLETE: CPT

## 2022-10-24 RX ORDER — HYDROCORTISONE 25 MG/G
2.5 CREAM TOPICAL
Qty: 28 | Refills: 0 | Status: ACTIVE | COMMUNITY
Start: 2022-10-05

## 2022-10-24 RX ORDER — TOCILIZUMAB 180 MG/ML
162 INJECTION, SOLUTION SUBCUTANEOUS
Qty: 3 | Refills: 0 | Status: ACTIVE | COMMUNITY
Start: 2022-09-26

## 2022-10-24 NOTE — REVIEW OF SYSTEMS
[Fever] : no fever [Headache] : no headache [Chills] : no chills [Blurry Vision] : no blurred vision [Earache] : no earache [SOB] : no shortness of breath [Dyspnea on exertion] : not dyspnea during exertion [Chest Discomfort] : no chest discomfort [Lower Ext Edema] : no extremity edema [Leg Claudication] : no intermittent leg claudication [Palpitations] : no palpitations [Orthopnea] : no orthopnea [PND] : no PND [Syncope] : no syncope [Cough] : no cough [Abdominal Pain] : no abdominal pain [Dysuria] : no dysuria [Joint Pain] : no joint pain [Rash] : no rash [Dizziness] : no dizziness

## 2022-10-24 NOTE — REASON FOR VISIT
[Symptom and Test Evaluation] : symptom and test evaluation [CV Risk Factors and Non-Cardiac Disease] : CV risk factors and non-cardiac disease [FreeTextEntry1] : Ms. 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 She feels well and has no complaints today. She has no SOB, [place], chest pain or palpitations. She has good exercise tolerance and has no limitations. \par \par BP has been borderline, occasionally in the 130-150s.\par

## 2022-10-24 NOTE — ASSESSMENT
[FreeTextEntry1] : Ms. 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 Order TTE to be performed in January\par -BP has been borderline. Patient would like to continue to watch it before starting an anti-hypertensive. However I advised her that if her BP is consistently >140/90, would start a low dose anti-hypertensive (lisinopril give her MR)\par -ALthough patient has HLD and mild carotid stenosis, she wishes to defer initiation of statin\par -Continue xarelto and sotalol for AF\par -Encouraged improved lifestyle modifications with these recommendations below:\par -Plant-based and Mediterranean diets, along with increased fruit, nut, vegetable, legume, and lean vegetable or animal protein (preferably fish) consumption\par -Engage in at least 150 minutes per week of accumulated moderate-intensity aerobic physical activity or 75 minutes per week of vigorous-intensity aerobic physical activity\par \par Jose Calvillo MD\par Non-Invasive Cardiology\par [facility]\par [facility]\par [address] [place] [address] [address]\par Office: [phone]\par

## 2022-10-24 NOTE — PHYSICAL EXAM
[Well Developed] : well developed [Well Nourished] : well nourished [No Acute Distress] : no acute distress [Normal Conjunctiva] : normal conjunctiva [Normal Venous Pressure] : normal venous pressure [No Carotid Bruit] : no carotid bruit [Normal S1, S2] : normal S1, S2 [No Murmur] : no murmur [No Rub] : no rub [No Gallop] : no gallop [Clear Lung Fields] : clear lung fields [Good Air Entry] : good air entry [No Respiratory Distress] : no respiratory distress  [Soft] : abdomen soft [Normal Gait] : normal gait [No Edema] : no edema [No Rash] : no rash [No Skin Lesions] : no skin lesions [Moves all extremities] : moves all extremities [No Focal Deficits] : no focal deficits [Normal Speech] : normal speech [Alert and Oriented] : alert and oriented

## 2022-10-27 ENCOUNTER — OUTPATIENT (OUTPATIENT)
Dept: OUTPATIENT SERVICES | Facility: HOSPITAL | Age: 82
LOS: 1 days | End: 2022-10-27

## 2022-10-27 ENCOUNTER — APPOINTMENT (OUTPATIENT)
Dept: HEMATOLOGY ONCOLOGY | Facility: CLINIC | Age: 82
End: 2022-10-27

## 2022-10-27 VITALS
DIASTOLIC BLOOD PRESSURE: 80 MMHG | TEMPERATURE: 97.2 F | OXYGEN SATURATION: 98 % | RESPIRATION RATE: 16 BRPM | HEART RATE: 72 BPM | BODY MASS INDEX: 26.81 KG/M2 | HEIGHT: 61 IN | WEIGHT: 142 LBS | SYSTOLIC BLOOD PRESSURE: 140 MMHG

## 2022-10-27 LAB
BASOPHILS # BLD AUTO: 0.05 K/UL
BASOPHILS NFR BLD AUTO: 1.1 %
EOSINOPHIL # BLD AUTO: 0.22 K/UL
EOSINOPHIL NFR BLD AUTO: 4.9 %
HCT VFR BLD CALC: 37.9 %
HGB BLD-MCNC: 12.7 G/DL
IMM GRANULOCYTES NFR BLD AUTO: 0.2 %
LYMPHOCYTES # BLD AUTO: 1.27 K/UL
LYMPHOCYTES NFR BLD AUTO: 28.4 %
MAN DIFF?: NORMAL
MCHC RBC-ENTMCNC: 32.2 PG
MCHC RBC-ENTMCNC: 33.5 G/DL
MCV RBC AUTO: 95.9 FL
MONOCYTES # BLD AUTO: 0.5 K/UL
MONOCYTES NFR BLD AUTO: 11.2 %
NEUTROPHILS # BLD AUTO: 2.42 K/UL
NEUTROPHILS NFR BLD AUTO: 54.2 %
PLATELET # BLD AUTO: 147 K/UL
RBC # BLD: 3.95 M/UL
RBC # FLD: 12.8 %
WBC # FLD AUTO: 4.47 K/UL

## 2022-10-27 PROCEDURE — 99213 OFFICE O/P EST LOW 20 MIN: CPT

## 2022-10-28 NOTE — PHYSICAL EXAM
[Fully active, able to carry on all pre-disease performance without restriction] : Status 0 - Fully active, able to carry on all pre-disease performance without restriction [Normal] : affect appropriate [de-identified] : Some arthritic changes as before

## 2022-10-28 NOTE — HISTORY OF PRESENT ILLNESS
[Disease:__________________________] : Disease: [unfilled] [de-identified] : The patient is coming for her yearly follow up upon the recommendation of her primary care physician for her leukopenia.\par SHe feels well and has no complaints.\par She had Covid 19 in July but took Paxlovid and recovered well. Has not had any other infection.\par She is up to date with all her screenings

## 2022-10-28 NOTE — ASSESSMENT
[FreeTextEntry1] : Mild leukopenia, chronic for many years, clinically asymptomatic.\par \par CBC showed a stable WBC count of 4.47. The patient may follow up in a year and as needed by referral of her managing, primary care physician, or herself if recurrent infections.\par The patient will keep all her appointments with all her other physicians and also not to forger proceeding with her colonoscopy.\par The patient was otherwise reassured from hematological standpoint.\par \par All questions answered.\par \par F/U in a year and as needed.

## 2023-01-03 DIAGNOSIS — D72.819 DECREASED WHITE BLOOD CELL COUNT, UNSPECIFIED: ICD-10-CM

## 2023-01-17 ENCOUNTER — APPOINTMENT (OUTPATIENT)
Dept: CARDIOLOGY | Facility: CLINIC | Age: 83
End: 2023-01-17

## 2023-01-26 ENCOUNTER — APPOINTMENT (OUTPATIENT)
Dept: CARDIOLOGY | Facility: CLINIC | Age: 83
End: 2023-01-26

## 2023-02-26 NOTE — H&P ADULT - PROBLEM SELECTOR PLAN 4
S/p AVR   for now will continue patient's oral doxycycline pending pulmonary and ID input (Will isolate on very small chance this is TB - since patient on steroid, will not place PPD)

## 2023-03-29 ENCOUNTER — APPOINTMENT (OUTPATIENT)
Dept: ORTHOPEDIC SURGERY | Facility: CLINIC | Age: 83
End: 2023-03-29
Payer: MEDICARE

## 2023-03-29 VITALS — HEIGHT: 61 IN | WEIGHT: 12 LBS | BODY MASS INDEX: 2.26 KG/M2

## 2023-03-29 DIAGNOSIS — M25.511 PAIN IN RIGHT SHOULDER: ICD-10-CM

## 2023-03-29 PROCEDURE — 99203 OFFICE O/P NEW LOW 30 MIN: CPT

## 2023-03-29 PROCEDURE — 73030 X-RAY EXAM OF SHOULDER: CPT | Mod: RT

## 2023-03-29 NOTE — ASSESSMENT
[FreeTextEntry1] : We went over options.  We agreed to physical therapy.  Heat is appropriate, Tylenol she is on blood thinner.  She will follow-up in the office Dr. Ramirez if the pain worsens or continues.\par \par This patient was seen under the supervision of Dr. Ramirez.\par

## 2023-03-29 NOTE — IMAGING
[de-identified] : On examination of the right shoulder no swelling, no ecchymosis, no erythema.  Skin is intact.  No tenderness over the clavicle, no tenderness over the AC joint.  She has some tenderness to the proximal humerus diffusely.  She has restriction noted actively and passively through forward flexion and abduction to less than 90 degrees, passively I can range her to approximately 90 degrees although she has pain and guarding.  She appears to have a negative drop arm.  Difficult to resist against rotator cuff strength testing secondary to her pain.  Positive Burgess.  Good range of motion of the elbow and wrist.\par \par X-ray right shoulder in the office today no obvious fracture or dislocation, A/C joint degenerative changes seen

## 2023-03-29 NOTE — HISTORY OF PRESENT ILLNESS
[de-identified] : 82-year-old female comes in today for evaluation of her right shoulder pain that began on March 26, 2023.  She cannot recall any specific injury or trauma.  Notes she has history of rotator cuff issues treated many years ago.  She is right-hand dominant.  She has history of a leaky heart valve, she takes Xarelto.  She has been using Tylenol and doing ice and heat with very minimal relief.  She states that she saw Dr. Ramirez in the past for her shoulder.

## 2023-05-02 ENCOUNTER — NON-APPOINTMENT (OUTPATIENT)
Age: 83
End: 2023-05-02

## 2023-05-31 DIAGNOSIS — Z12.31 ENCOUNTER FOR SCREENING MAMMOGRAM FOR MALIGNANT NEOPLASM OF BREAST: ICD-10-CM

## 2023-06-13 ENCOUNTER — APPOINTMENT (OUTPATIENT)
Dept: ORTHOPEDIC SURGERY | Facility: CLINIC | Age: 83
End: 2023-06-13

## 2023-07-25 ENCOUNTER — NON-APPOINTMENT (OUTPATIENT)
Age: 83
End: 2023-07-25

## 2023-08-09 ENCOUNTER — OUTPATIENT (OUTPATIENT)
Dept: INPATIENT UNIT | Facility: HOSPITAL | Age: 83
LOS: 1 days | Discharge: HOME | End: 2023-08-09
Payer: MEDICARE

## 2023-08-09 DIAGNOSIS — I48.91 UNSPECIFIED ATRIAL FIBRILLATION: ICD-10-CM

## 2023-08-09 PROCEDURE — 93325 DOPPLER ECHO COLOR FLOW MAPG: CPT

## 2023-08-09 PROCEDURE — 93005 ELECTROCARDIOGRAM TRACING: CPT | Mod: XU

## 2023-08-09 PROCEDURE — 93320 DOPPLER ECHO COMPLETE: CPT

## 2023-08-09 PROCEDURE — 93312 ECHO TRANSESOPHAGEAL: CPT

## 2023-08-09 PROCEDURE — 93010 ELECTROCARDIOGRAM REPORT: CPT

## 2023-08-09 PROCEDURE — 92960 CARDIOVERSION ELECTRIC EXT: CPT

## 2023-08-09 RX ORDER — RIVAROXABAN 15 MG-20MG
20 KIT ORAL
Refills: 0 | Status: DISCONTINUED | OUTPATIENT
Start: 2023-08-09 | End: 2023-08-09

## 2023-08-09 RX ORDER — SOTALOL HCL 120 MG
80 TABLET ORAL EVERY 12 HOURS
Refills: 0 | Status: DISCONTINUED | OUTPATIENT
Start: 2023-08-09 | End: 2023-08-09

## 2023-08-09 NOTE — CHART NOTE - NSCHARTNOTEFT_GEN_A_CORE
PRE-OP DIAGNOSIS:  - A fib     POST-OP DIAGNOSIS:  - NSR after 200J of direct current   - Moderate to severe MR  - Moderate TR  - Trace PI   - Mild AI   - Normal LV systolic function     PROCEDURE: Transesophageal echocardiogram    Primary Physician: Dr. Pedraza   Assistant: Dr. Aguillon    ANESTHESIA TYPE:   [ x ] Conscious Sedation    CONDITION:  [  ] Good    ESTIMATED BLOOD LOSS: None    COMPLICATIONS: None    FINDINGS:    After risks and benefits of procedures were explained, informed consent was obtained and placed in chart. Refer to Anesthesia note for sedation details.  The MIGUEL A probe was passed into the esophagus without difficulty.  Transesophageal images were obtained.  The MIGUEL A probe was removed without difficulty and examined.  There was no evidence for bleeding.  The patient tolerated the procedure well without any immediate MIGUEL A-related complications.      Preliminary Findings:  LA: Moderately enlarged   OFELIA: Left atrial appendage was clear of clot and smoke.  LV: LVEF was estimated at 55%.   MV: Moderate to severe MR, no evidence of MS.   AV: Trileaflet. Thickened. Mild AI, no evidence of AS.   RA: Mildly elevated.   RV: Normal function.   TV: Moderate TR.   PV: Trace PI.   IAS: no PFO. No R-> L shunt by color doppler.   Aorta: There was mild, non-mobile atheroma seen in the thoracic aorta.     Patient successfully converted to sinus rhythm with two synchronized  200J of direct current cardioversion.    PLAN OF CARE:  - Discharge home today.   - Outpatient follow up with cardiology for the MR. PRE-OP DIAGNOSIS:  - A fib     POST-OP DIAGNOSIS:  - NSR after 200J of direct current   - Moderate to severe MR  - Moderate TR  - Trace PI   - Mild AI   - Normal LV systolic function     PROCEDURE: Transesophageal echocardiogram    Primary Physician: Dr. Pedraza   Assistant: Dr. Aguillon    ANESTHESIA TYPE:   [ x ] Conscious Sedation    CONDITION:  [  ] Good    ESTIMATED BLOOD LOSS: None    COMPLICATIONS: None    FINDINGS:    After risks and benefits of procedures were explained, informed consent was obtained and placed in chart. Refer to Anesthesia note for sedation details.  The MIGUEL A probe was passed into the esophagus without difficulty.  Transesophageal images were obtained.  The MIGUEL A probe was removed without difficulty and examined.  There was no evidence for bleeding.  The patient tolerated the procedure well without any immediate MIGUEL A-related complications.      Preliminary Findings:  LA: Moderately enlarged   OFELIA: Left atrial appendage was clear of clot and smoke.  LV: LVEF was estimated at 55%.   MV: Moderate to severe MR with 2 jets centrally directed, no evidence of MS.   AV: Trileaflet. Thickened. Mild AI, no evidence of AS.   RA: Mildly elevated.   RV: Normal function.   TV: Moderate TR.   PV: Trace PI.   IAS: no PFO. No R-> L shunt by color doppler.   Aorta: There was mild, non-mobile atheroma seen in the thoracic aorta.     Patient successfully converted to sinus rhythm with two synchronized  200J of direct current cardioversion.    PLAN OF CARE:  - Discharge home today.   - Outpatient follow up with cardiology for the MR. 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 Pedraza   Assistant: Dr. Aguillon    ANESTHESIA TYPE:   [ x ] Conscious Sedation    CONDITION:  [  ] Good    ESTIMATED BLOOD LOSS: None    COMPLICATIONS: None    FINDINGS:    After risks and benefits of procedures were explained, informed consent was obtained and placed in chart. Refer to Anesthesia note for sedation details.  The MIGUEL A probe was passed into the esophagus without difficulty.  Transesophageal images were obtained.  The MIGEUL A probe was removed without difficulty and examined.  There was no evidence for bleeding.  The patient tolerated the procedure well without any immediate MIGUEL A-related complications.      Preliminary Findings:  LA: Moderately enlarged   OFELIA: Left atrial appendage was clear of clot and smoke.  LV: LVEF was estimated at 55%.   MV: Moderate to severe MR with 2 jets centrally directed, no evidence of MS.   AV: Trileaflet. Thickened. Mild AI, no evidence of AS.   RA: Mildly elevated.   RV: Normal function.   TV: Moderate TR.   PV: Trace PI.   IAS: no PFO. No R-> L shunt by color doppler.   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 There was mild, non-mobile atheroma seen in the thoracic aorta.     Patient successfully converted to sinus rhythm with two synchronized  200J of direct current cardioversion.    PLAN OF CARE:  - Discharge home today.   - Outpatient follow up with cardiology for the MR. 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 Pedraza   Assistant: Dr. Aguillon    ANESTHESIA TYPE:   [ x ] Conscious Sedation    CONDITION:  [  ] Good    ESTIMATED BLOOD LOSS: None    COMPLICATIONS: None    FINDINGS:    After risks and benefits of procedures were explained, informed consent was obtained and placed in chart. Refer to Anesthesia note for sedation details.  The MIGUEL A probe was passed into the esophagus without difficulty.  Transesophageal images were obtained.  The MIGUEL A probe was removed without difficulty and examined.  There was no evidence for bleeding.  The patient tolerated the procedure well without any immediate MIGUEL A-related complications.      Preliminary Findings:  LA: Moderately enlarged   OFELIA: Left atrial appendage was clear of clot and smoke.  LV: LVEF was estimated at 55%.   MV: Moderate to severe MR with 2 jets centrally directed, no evidence of MS.   AV: Trileaflet. Thickened. Mild AI, no evidence of AS.   RA: Mildly elevated.   RV: Normal function.   TV: Moderate TR.   PV: Trace PI.   IAS: no PFO. No R-> L shunt by color doppler.   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 There was mild, non-mobile atheroma seen in the thoracic aorta.     Patient successfully converted to sinus rhythm with two synchronized  200J of direct current cardioversion.    PLAN OF CARE:  - Discharge home today.   - Outpatient follow up with cardiology for the MR.  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 Pedraza   Assistant: Dr. Aguillon    ANESTHESIA TYPE:   [ x ] Conscious Sedation    CONDITION:  [  ] Good    ESTIMATED BLOOD LOSS: None    COMPLICATIONS: None    FINDINGS:    After risks and benefits of procedures were explained, informed consent was obtained and placed in chart. Refer to Anesthesia note for sedation details.  The MIGUEL A probe was passed into the esophagus without difficulty.  Transesophageal images were obtained.  The MIGUEL A probe was removed without difficulty and examined.  There was no evidence for bleeding.  The patient tolerated the procedure well without any immediate MIGUEL A-related complications.      Preliminary Findings:  LA: Moderately enlarged   OFELIA: Left atrial appendage was clear of clot and smoke.  LV: LVEF was estimated at 55%.   MV: Moderate to severe MR with 2 jets centrally directed, no evidence of MS.   AV: Trileaflet. Thickened. Mild AI, no evidence of AS.   RA: Mildly elevated.   RV: Normal function.   TV: Moderate TR.   PV: Trace PI.   IAS: no PFO. No R-> L shunt by color doppler.   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 There was mild, non-mobile atheroma seen in the thoracic aorta.     Patient successfully converted to sinus rhythm with two synchronized  200J of direct current cardioversion.    PLAN OF CARE:  - Discharge home today.   - Outpatient follow up with cardiology for the MR.  - Will start aspirin and statin PRE-OP DIAGNOSIS:  - A fib     POST-OP DIAGNOSIS:  - NSR after 200J of direct current   - Moderate to severe MR  - Normal LV systolic function   - Ulcerated plaque of the aortic arch measuring 0.9 x 0.9 cm    PROCEDURE: Transesophageal echocardiogram    Primary Physician: Dr. Pedraza   Assistant: Dr. Aguillon    ANESTHESIA TYPE:   [ x ] Conscious Sedation    CONDITION:  [  ] Good    ESTIMATED BLOOD LOSS: None    COMPLICATIONS: None    FINDINGS:    After risks and benefits of procedures were explained, informed consent was obtained and placed in chart. Refer to Anesthesia note for sedation details.  The MIGUEL A probe was passed into the esophagus without difficulty.  Transesophageal images were obtained.  The MIGUEL A probe was removed without difficulty and examined.  There was no evidence for bleeding.  The patient tolerated the procedure well without any immediate MIGUEL A-related complications.      Preliminary Findings:  LA: Moderately enlarged   OFELIA: Left atrial appendage was clear of clot and smoke.  LV: LVEF was estimated at 55%.   MV: Moderate to severe MR with 2 jets centrally directed, no evidence of MS.   AV: Trileaflet. Thickened. Mild AI, no evidence of AS.   RA: Mildly elevated.   RV: Normal function.   TV: Moderate TR.   PV: Trace PI.   IAS: no PFO. No R-> L shunt by color doppler.   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 There was mild, non-mobile atheroma seen in the thoracic aorta.     Patient successfully converted to sinus rhythm with two synchronized  200J of direct current cardioversion.    PLAN OF CARE:  - Discharge home today.   - Outpatient follow up with cardiology for the MR.  - Will start aspirin and statin

## 2023-08-09 NOTE — H&P CARDIOLOGY - HISTORY OF PRESENT ILLNESS
83 year woman with HTN and pAF on Xarelto   Presents for worsening dyspnea on exertion and palpitations   She was found to be in a fib with RVR and presents today for MIGUEL A / DCCV    Labs reviewed   ROS negative   No absolute contraindications to proceed with MIGUEL A / DCCV

## 2023-08-09 NOTE — ASU PATIENT PROFILE, ADULT - NSICDXPASTMEDICALHX_GEN_ALL_CORE_FT
PAST MEDICAL HISTORY:  Atrial fibrillation     History of temporal arteritis     History of temporal artery biopsy     HTN (hypertension)     Osteoporosis

## 2023-08-10 RX ORDER — ATORVASTATIN CALCIUM 80 MG/1
1 TABLET, FILM COATED ORAL
Qty: 30 | Refills: 3
Start: 2023-08-10

## 2023-08-16 DIAGNOSIS — I48.19 OTHER PERSISTENT ATRIAL FIBRILLATION: ICD-10-CM

## 2023-08-19 ENCOUNTER — NON-APPOINTMENT (OUTPATIENT)
Age: 83
End: 2023-08-19

## 2023-08-31 NOTE — CONSULT NOTE ADULT - NEUROLOGICAL
Spoke with pcp about pt's c/o itching that is still ongoing. PCP suggests that pt take an OTC Zyrtec and instructed her to call the infusion center where she had her infusion done and let them know that she is itching with no relief since having her last infusion. Called pt and gave her all instructions from PCP, pt states she understands. No further questions at this time.   details… Alert & oriented; no sensory, motor or coordination deficits, normal reflexes

## 2023-09-03 NOTE — PROGRESS NOTE ADULT - PROBLEM/PLAN-1
States she experienced nausea this morning, and did not tolerate her diet very well.  Still having flatus.        Vital signs good  Abdomen is soft and appropriately tender.        Will adjust back to liquid diet    Await bowel movement prior to discharge.   DISPLAY PLAN FREE TEXT

## 2023-09-30 ENCOUNTER — NON-APPOINTMENT (OUTPATIENT)
Age: 83
End: 2023-09-30

## 2023-10-04 PROBLEM — M81.0 AGE-RELATED OSTEOPOROSIS WITHOUT CURRENT PATHOLOGICAL FRACTURE: Chronic | Status: ACTIVE | Noted: 2023-08-09

## 2023-10-04 PROBLEM — Z98.890 OTHER SPECIFIED POSTPROCEDURAL STATES: Chronic | Status: ACTIVE | Noted: 2023-08-09

## 2023-10-04 PROBLEM — Z87.39 PERSONAL HISTORY OF OTHER DISEASES OF THE MUSCULOSKELETAL SYSTEM AND CONNECTIVE TISSUE: Chronic | Status: ACTIVE | Noted: 2023-08-09

## 2023-10-23 ENCOUNTER — LABORATORY RESULT (OUTPATIENT)
Age: 83
End: 2023-10-23

## 2023-10-24 ENCOUNTER — APPOINTMENT (OUTPATIENT)
Dept: OBGYN | Facility: CLINIC | Age: 83
End: 2023-10-24
Payer: MEDICARE

## 2023-10-24 VITALS
DIASTOLIC BLOOD PRESSURE: 73 MMHG | HEIGHT: 61 IN | SYSTOLIC BLOOD PRESSURE: 97 MMHG | BODY MASS INDEX: 26.06 KG/M2 | HEART RATE: 102 BPM | WEIGHT: 138 LBS | TEMPERATURE: 98.6 F

## 2023-10-24 DIAGNOSIS — N95.0 POSTMENOPAUSAL BLEEDING: ICD-10-CM

## 2023-10-24 DIAGNOSIS — N95.2 POSTMENOPAUSAL ATROPHIC VAGINITIS: ICD-10-CM

## 2023-10-24 DIAGNOSIS — R92.30 DENSE BREASTS, UNSPECIFIED: ICD-10-CM

## 2023-10-24 DIAGNOSIS — Z86.79 PERSONAL HISTORY OF OTHER DISEASES OF THE CIRCULATORY SYSTEM: ICD-10-CM

## 2023-10-24 DIAGNOSIS — Z12.4 ENCOUNTER FOR SCREENING FOR MALIGNANT NEOPLASM OF CERVIX: ICD-10-CM

## 2023-10-24 DIAGNOSIS — E66.3 OVERWEIGHT: ICD-10-CM

## 2023-10-24 DIAGNOSIS — Z01.419 ENCOUNTER FOR GYNECOLOGICAL EXAMINATION (GENERAL) (ROUTINE) W/OUT ABNORMAL FINDINGS: ICD-10-CM

## 2023-10-24 DIAGNOSIS — Z71.89 OTHER SPECIFIED COUNSELING: ICD-10-CM

## 2023-10-24 DIAGNOSIS — R31.29 OTHER MICROSCOPIC HEMATURIA: ICD-10-CM

## 2023-10-24 DIAGNOSIS — M85.80 OTHER SPECIFIED DISORDERS OF BONE DENSITY AND STRUCTURE, UNSPECIFIED SITE: ICD-10-CM

## 2023-10-24 LAB
BILIRUB UR QL STRIP: NORMAL
CLARITY UR: CLEAR
COLLECTION METHOD: NORMAL
GLUCOSE UR-MCNC: NORMAL
HCG UR QL: 0.2 EU/DL
HGB UR QL STRIP.AUTO: ABNORMAL
KETONES UR-MCNC: NORMAL
LEUKOCYTE ESTERASE UR QL STRIP: NORMAL
NITRITE UR QL STRIP: NORMAL
PH UR STRIP: 7
PROT UR STRIP-MCNC: NORMAL
SP GR UR STRIP: 1.01

## 2023-10-24 PROCEDURE — 81003 URINALYSIS AUTO W/O SCOPE: CPT | Mod: QW

## 2023-10-24 PROCEDURE — G0101: CPT

## 2023-10-24 PROCEDURE — 99213 OFFICE O/P EST LOW 20 MIN: CPT | Mod: 25

## 2023-10-25 PROBLEM — R92.30 DENSE BREAST TISSUE: Status: RESOLVED | Noted: 2023-05-31 | Resolved: 2023-10-25

## 2023-10-25 PROBLEM — N95.2 ATROPHIC VULVOVAGINITIS: Status: ACTIVE | Noted: 2022-07-22

## 2023-10-25 PROBLEM — R31.29 MICROSCOPIC HEMATURIA: Status: ACTIVE | Noted: 2021-10-18

## 2023-10-25 PROBLEM — E66.3 OVERWEIGHT: Status: ACTIVE | Noted: 2023-10-25

## 2023-10-25 PROBLEM — Z71.89 OTHER SPECIFIED COUNSELING: Status: ACTIVE | Noted: 2022-07-22

## 2023-10-25 PROBLEM — Z86.79 HISTORY OF ATRIAL FIBRILLATION: Status: RESOLVED | Noted: 2023-10-25 | Resolved: 2023-10-25

## 2023-10-25 PROBLEM — M85.80 OSTEOPENIA, UNSPECIFIED LOCATION: Status: ACTIVE | Noted: 2023-10-25

## 2023-10-25 RX ORDER — MOMETASONE FUROATE 1 MG/G
0.1 CREAM TOPICAL
Qty: 60 | Refills: 0 | Status: ACTIVE | COMMUNITY
Start: 2023-10-24

## 2023-10-29 LAB
BACTERIA UR CULT: ABNORMAL
CYTOLOGY CVX/VAG DOC THIN PREP: NORMAL
HPV HIGH+LOW RISK DNA PNL CVX: NOT DETECTED

## 2023-10-30 ENCOUNTER — NON-APPOINTMENT (OUTPATIENT)
Age: 83
End: 2023-10-30

## 2023-11-02 ENCOUNTER — APPOINTMENT (OUTPATIENT)
Dept: HEMATOLOGY ONCOLOGY | Facility: CLINIC | Age: 83
End: 2023-11-02

## 2023-11-10 ENCOUNTER — APPOINTMENT (OUTPATIENT)
Dept: HEMATOLOGY ONCOLOGY | Facility: CLINIC | Age: 83
End: 2023-11-10
Payer: MEDICARE

## 2023-11-10 ENCOUNTER — OUTPATIENT (OUTPATIENT)
Dept: OUTPATIENT SERVICES | Facility: HOSPITAL | Age: 83
LOS: 1 days | End: 2023-11-10
Payer: MEDICARE

## 2023-11-10 VITALS
HEIGHT: 61 IN | SYSTOLIC BLOOD PRESSURE: 110 MMHG | TEMPERATURE: 96.6 F | DIASTOLIC BLOOD PRESSURE: 76 MMHG | WEIGHT: 144 LBS | HEART RATE: 94 BPM | BODY MASS INDEX: 27.19 KG/M2

## 2023-11-10 DIAGNOSIS — D72.819 DECREASED WHITE BLOOD CELL COUNT, UNSPECIFIED: ICD-10-CM

## 2023-11-10 PROCEDURE — 99213 OFFICE O/P EST LOW 20 MIN: CPT

## 2023-11-11 DIAGNOSIS — D72.819 DECREASED WHITE BLOOD CELL COUNT, UNSPECIFIED: ICD-10-CM

## 2023-11-12 ENCOUNTER — RX RENEWAL (OUTPATIENT)
Age: 83
End: 2023-11-12

## 2023-11-12 RX ORDER — SOTALOL HYDROCHLORIDE 80 MG/1
80 TABLET ORAL
Qty: 180 | Refills: 3 | Status: ACTIVE | COMMUNITY
Start: 2020-12-02 | End: 1900-01-01

## 2023-11-18 ENCOUNTER — OUTPATIENT (OUTPATIENT)
Dept: OUTPATIENT SERVICES | Facility: HOSPITAL | Age: 83
LOS: 1 days | End: 2023-11-18
Payer: MEDICARE

## 2023-11-18 DIAGNOSIS — N95.0 POSTMENOPAUSAL BLEEDING: ICD-10-CM

## 2023-11-18 DIAGNOSIS — Z00.8 ENCOUNTER FOR OTHER GENERAL EXAMINATION: ICD-10-CM

## 2023-11-18 PROCEDURE — 76830 TRANSVAGINAL US NON-OB: CPT

## 2023-11-18 PROCEDURE — 76856 US EXAM PELVIC COMPLETE: CPT

## 2023-11-18 PROCEDURE — 76856 US EXAM PELVIC COMPLETE: CPT | Mod: 26

## 2023-11-18 PROCEDURE — 76830 TRANSVAGINAL US NON-OB: CPT | Mod: 26

## 2023-11-19 DIAGNOSIS — N95.0 POSTMENOPAUSAL BLEEDING: ICD-10-CM

## 2023-11-22 LAB — BACTERIA UR CULT: NORMAL

## 2023-11-24 ENCOUNTER — NON-APPOINTMENT (OUTPATIENT)
Age: 83
End: 2023-11-24

## 2023-12-18 ENCOUNTER — OUTPATIENT (OUTPATIENT)
Dept: OUTPATIENT SERVICES | Facility: HOSPITAL | Age: 83
LOS: 1 days | End: 2023-12-18
Payer: MEDICARE

## 2023-12-18 ENCOUNTER — RESULT REVIEW (OUTPATIENT)
Age: 83
End: 2023-12-18

## 2023-12-18 VITALS
WEIGHT: 139.99 LBS | TEMPERATURE: 98 F | SYSTOLIC BLOOD PRESSURE: 128 MMHG | HEART RATE: 105 BPM | DIASTOLIC BLOOD PRESSURE: 79 MMHG | RESPIRATION RATE: 20 BRPM | HEIGHT: 64 IN | OXYGEN SATURATION: 96 %

## 2023-12-18 DIAGNOSIS — I48.19 OTHER PERSISTENT ATRIAL FIBRILLATION: ICD-10-CM

## 2023-12-18 DIAGNOSIS — Z98.49 CATARACT EXTRACTION STATUS, UNSPECIFIED EYE: Chronic | ICD-10-CM

## 2023-12-18 DIAGNOSIS — Z01.818 ENCOUNTER FOR OTHER PREPROCEDURAL EXAMINATION: ICD-10-CM

## 2023-12-18 LAB
ALBUMIN SERPL ELPH-MCNC: 4.5 G/DL — SIGNIFICANT CHANGE UP (ref 3.5–5.2)
ALBUMIN SERPL ELPH-MCNC: 4.5 G/DL — SIGNIFICANT CHANGE UP (ref 3.5–5.2)
ALP SERPL-CCNC: 51 U/L — SIGNIFICANT CHANGE UP (ref 30–115)
ALP SERPL-CCNC: 51 U/L — SIGNIFICANT CHANGE UP (ref 30–115)
ALT FLD-CCNC: 22 U/L — SIGNIFICANT CHANGE UP (ref 0–41)
ALT FLD-CCNC: 22 U/L — SIGNIFICANT CHANGE UP (ref 0–41)
ANION GAP SERPL CALC-SCNC: 11 MMOL/L — SIGNIFICANT CHANGE UP (ref 7–14)
ANION GAP SERPL CALC-SCNC: 11 MMOL/L — SIGNIFICANT CHANGE UP (ref 7–14)
APTT BLD: 30.8 SEC — SIGNIFICANT CHANGE UP (ref 27–39.2)
APTT BLD: 30.8 SEC — SIGNIFICANT CHANGE UP (ref 27–39.2)
AST SERPL-CCNC: 23 U/L — SIGNIFICANT CHANGE UP (ref 0–41)
AST SERPL-CCNC: 23 U/L — SIGNIFICANT CHANGE UP (ref 0–41)
BASOPHILS # BLD AUTO: 0.06 K/UL — SIGNIFICANT CHANGE UP (ref 0–0.2)
BASOPHILS # BLD AUTO: 0.06 K/UL — SIGNIFICANT CHANGE UP (ref 0–0.2)
BASOPHILS NFR BLD AUTO: 1.4 % — HIGH (ref 0–1)
BASOPHILS NFR BLD AUTO: 1.4 % — HIGH (ref 0–1)
BILIRUB SERPL-MCNC: 0.4 MG/DL — SIGNIFICANT CHANGE UP (ref 0.2–1.2)
BILIRUB SERPL-MCNC: 0.4 MG/DL — SIGNIFICANT CHANGE UP (ref 0.2–1.2)
BLD GP AB SCN SERPL QL: SIGNIFICANT CHANGE UP
BLD GP AB SCN SERPL QL: SIGNIFICANT CHANGE UP
BUN SERPL-MCNC: 18 MG/DL — SIGNIFICANT CHANGE UP (ref 10–20)
BUN SERPL-MCNC: 18 MG/DL — SIGNIFICANT CHANGE UP (ref 10–20)
CALCIUM SERPL-MCNC: 9.4 MG/DL — SIGNIFICANT CHANGE UP (ref 8.4–10.5)
CALCIUM SERPL-MCNC: 9.4 MG/DL — SIGNIFICANT CHANGE UP (ref 8.4–10.5)
CHLORIDE SERPL-SCNC: 93 MMOL/L — LOW (ref 98–110)
CHLORIDE SERPL-SCNC: 93 MMOL/L — LOW (ref 98–110)
CO2 SERPL-SCNC: 28 MMOL/L — SIGNIFICANT CHANGE UP (ref 17–32)
CO2 SERPL-SCNC: 28 MMOL/L — SIGNIFICANT CHANGE UP (ref 17–32)
CREAT SERPL-MCNC: 0.6 MG/DL — LOW (ref 0.7–1.5)
CREAT SERPL-MCNC: 0.6 MG/DL — LOW (ref 0.7–1.5)
EGFR: 90 ML/MIN/1.73M2 — SIGNIFICANT CHANGE UP
EGFR: 90 ML/MIN/1.73M2 — SIGNIFICANT CHANGE UP
EOSINOPHIL # BLD AUTO: 0.18 K/UL — SIGNIFICANT CHANGE UP (ref 0–0.7)
EOSINOPHIL # BLD AUTO: 0.18 K/UL — SIGNIFICANT CHANGE UP (ref 0–0.7)
EOSINOPHIL NFR BLD AUTO: 4.2 % — SIGNIFICANT CHANGE UP (ref 0–8)
EOSINOPHIL NFR BLD AUTO: 4.2 % — SIGNIFICANT CHANGE UP (ref 0–8)
GLUCOSE SERPL-MCNC: 107 MG/DL — HIGH (ref 70–99)
GLUCOSE SERPL-MCNC: 107 MG/DL — HIGH (ref 70–99)
HCT VFR BLD CALC: 36.2 % — LOW (ref 37–47)
HCT VFR BLD CALC: 36.2 % — LOW (ref 37–47)
HGB BLD-MCNC: 12.4 G/DL — SIGNIFICANT CHANGE UP (ref 12–16)
HGB BLD-MCNC: 12.4 G/DL — SIGNIFICANT CHANGE UP (ref 12–16)
IMM GRANULOCYTES NFR BLD AUTO: 0.7 % — HIGH (ref 0.1–0.3)
IMM GRANULOCYTES NFR BLD AUTO: 0.7 % — HIGH (ref 0.1–0.3)
INR BLD: 1.02 RATIO — SIGNIFICANT CHANGE UP (ref 0.65–1.3)
INR BLD: 1.02 RATIO — SIGNIFICANT CHANGE UP (ref 0.65–1.3)
LYMPHOCYTES # BLD AUTO: 1.32 K/UL — SIGNIFICANT CHANGE UP (ref 1.2–3.4)
LYMPHOCYTES # BLD AUTO: 1.32 K/UL — SIGNIFICANT CHANGE UP (ref 1.2–3.4)
LYMPHOCYTES # BLD AUTO: 30.5 % — SIGNIFICANT CHANGE UP (ref 20.5–51.1)
LYMPHOCYTES # BLD AUTO: 30.5 % — SIGNIFICANT CHANGE UP (ref 20.5–51.1)
MCHC RBC-ENTMCNC: 33.2 PG — HIGH (ref 27–31)
MCHC RBC-ENTMCNC: 33.2 PG — HIGH (ref 27–31)
MCHC RBC-ENTMCNC: 34.3 G/DL — SIGNIFICANT CHANGE UP (ref 32–37)
MCHC RBC-ENTMCNC: 34.3 G/DL — SIGNIFICANT CHANGE UP (ref 32–37)
MCV RBC AUTO: 96.8 FL — SIGNIFICANT CHANGE UP (ref 81–99)
MCV RBC AUTO: 96.8 FL — SIGNIFICANT CHANGE UP (ref 81–99)
MONOCYTES # BLD AUTO: 0.37 K/UL — SIGNIFICANT CHANGE UP (ref 0.1–0.6)
MONOCYTES # BLD AUTO: 0.37 K/UL — SIGNIFICANT CHANGE UP (ref 0.1–0.6)
MONOCYTES NFR BLD AUTO: 8.5 % — SIGNIFICANT CHANGE UP (ref 1.7–9.3)
MONOCYTES NFR BLD AUTO: 8.5 % — SIGNIFICANT CHANGE UP (ref 1.7–9.3)
NEUTROPHILS # BLD AUTO: 2.37 K/UL — SIGNIFICANT CHANGE UP (ref 1.4–6.5)
NEUTROPHILS # BLD AUTO: 2.37 K/UL — SIGNIFICANT CHANGE UP (ref 1.4–6.5)
NEUTROPHILS NFR BLD AUTO: 54.7 % — SIGNIFICANT CHANGE UP (ref 42.2–75.2)
NEUTROPHILS NFR BLD AUTO: 54.7 % — SIGNIFICANT CHANGE UP (ref 42.2–75.2)
NRBC # BLD: 0 /100 WBCS — SIGNIFICANT CHANGE UP (ref 0–0)
NRBC # BLD: 0 /100 WBCS — SIGNIFICANT CHANGE UP (ref 0–0)
PLATELET # BLD AUTO: 189 K/UL — SIGNIFICANT CHANGE UP (ref 130–400)
PLATELET # BLD AUTO: 189 K/UL — SIGNIFICANT CHANGE UP (ref 130–400)
PMV BLD: 9.2 FL — SIGNIFICANT CHANGE UP (ref 7.4–10.4)
PMV BLD: 9.2 FL — SIGNIFICANT CHANGE UP (ref 7.4–10.4)
POTASSIUM SERPL-MCNC: 4.3 MMOL/L — SIGNIFICANT CHANGE UP (ref 3.5–5)
POTASSIUM SERPL-MCNC: 4.3 MMOL/L — SIGNIFICANT CHANGE UP (ref 3.5–5)
POTASSIUM SERPL-SCNC: 4.3 MMOL/L — SIGNIFICANT CHANGE UP (ref 3.5–5)
POTASSIUM SERPL-SCNC: 4.3 MMOL/L — SIGNIFICANT CHANGE UP (ref 3.5–5)
PROT SERPL-MCNC: 6.6 G/DL — SIGNIFICANT CHANGE UP (ref 6–8)
PROT SERPL-MCNC: 6.6 G/DL — SIGNIFICANT CHANGE UP (ref 6–8)
PROTHROM AB SERPL-ACNC: 11.6 SEC — SIGNIFICANT CHANGE UP (ref 9.95–12.87)
PROTHROM AB SERPL-ACNC: 11.6 SEC — SIGNIFICANT CHANGE UP (ref 9.95–12.87)
RBC # BLD: 3.74 M/UL — LOW (ref 4.2–5.4)
RBC # BLD: 3.74 M/UL — LOW (ref 4.2–5.4)
RBC # FLD: 13 % — SIGNIFICANT CHANGE UP (ref 11.5–14.5)
RBC # FLD: 13 % — SIGNIFICANT CHANGE UP (ref 11.5–14.5)
SODIUM SERPL-SCNC: 132 MMOL/L — LOW (ref 135–146)
SODIUM SERPL-SCNC: 132 MMOL/L — LOW (ref 135–146)
WBC # BLD: 4.33 K/UL — LOW (ref 4.8–10.8)
WBC # BLD: 4.33 K/UL — LOW (ref 4.8–10.8)
WBC # FLD AUTO: 4.33 K/UL — LOW (ref 4.8–10.8)
WBC # FLD AUTO: 4.33 K/UL — LOW (ref 4.8–10.8)

## 2023-12-18 PROCEDURE — 93010 ELECTROCARDIOGRAM REPORT: CPT

## 2023-12-18 PROCEDURE — 85025 COMPLETE CBC W/AUTO DIFF WBC: CPT

## 2023-12-18 PROCEDURE — 80053 COMPREHEN METABOLIC PANEL: CPT

## 2023-12-18 PROCEDURE — 36415 COLL VENOUS BLD VENIPUNCTURE: CPT

## 2023-12-18 PROCEDURE — 71046 X-RAY EXAM CHEST 2 VIEWS: CPT

## 2023-12-18 PROCEDURE — 86901 BLOOD TYPING SEROLOGIC RH(D): CPT

## 2023-12-18 PROCEDURE — 71046 X-RAY EXAM CHEST 2 VIEWS: CPT | Mod: 26

## 2023-12-18 PROCEDURE — 86850 RBC ANTIBODY SCREEN: CPT

## 2023-12-18 PROCEDURE — 99214 OFFICE O/P EST MOD 30 MIN: CPT | Mod: 25

## 2023-12-18 PROCEDURE — 86900 BLOOD TYPING SEROLOGIC ABO: CPT

## 2023-12-18 PROCEDURE — 85730 THROMBOPLASTIN TIME PARTIAL: CPT

## 2023-12-18 PROCEDURE — 85610 PROTHROMBIN TIME: CPT

## 2023-12-18 PROCEDURE — 93005 ELECTROCARDIOGRAM TRACING: CPT

## 2023-12-18 RX ORDER — OLMESARTAN MEDOXOMIL 5 MG/1
1 TABLET, FILM COATED ORAL
Refills: 0 | DISCHARGE

## 2023-12-18 NOTE — H&P PST ADULT - REASON FOR ADMISSION
Patient is a 83  year old  female presenting to [facility] in preparation for AF ABLATION/ MIGUEL A/CRYO   on   1/10/24 under general anesthesia by Dr. harvey

## 2023-12-18 NOTE — H&P PST ADULT - NSICDXPASTMEDICALHX_GEN_ALL_CORE_FT
PAST MEDICAL HISTORY:  Atrial fibrillation     H/O temporal arteritis     History of temporal arteritis     History of temporal artery biopsy     HTN (hypertension)     Osteoporosis

## 2023-12-19 DIAGNOSIS — I48.19 OTHER PERSISTENT ATRIAL FIBRILLATION: ICD-10-CM

## 2023-12-19 DIAGNOSIS — Z01.818 ENCOUNTER FOR OTHER PREPROCEDURAL EXAMINATION: ICD-10-CM

## 2023-12-20 ENCOUNTER — OUTPATIENT (OUTPATIENT)
Dept: OUTPATIENT SERVICES | Facility: HOSPITAL | Age: 83
LOS: 1 days | End: 2023-12-20
Payer: MEDICARE

## 2023-12-20 DIAGNOSIS — Z98.49 CATARACT EXTRACTION STATUS, UNSPECIFIED EYE: Chronic | ICD-10-CM

## 2023-12-20 DIAGNOSIS — Z00.8 ENCOUNTER FOR OTHER GENERAL EXAMINATION: ICD-10-CM

## 2023-12-20 DIAGNOSIS — Z13.820 ENCOUNTER FOR SCREENING FOR OSTEOPOROSIS: ICD-10-CM

## 2023-12-20 PROCEDURE — 77080 DXA BONE DENSITY AXIAL: CPT

## 2023-12-21 DIAGNOSIS — Z13.820 ENCOUNTER FOR SCREENING FOR OSTEOPOROSIS: ICD-10-CM

## 2024-01-10 ENCOUNTER — TRANSCRIPTION ENCOUNTER (OUTPATIENT)
Age: 84
End: 2024-01-10

## 2024-01-10 ENCOUNTER — INPATIENT (INPATIENT)
Facility: HOSPITAL | Age: 84
LOS: 0 days | Discharge: HOME | DRG: 274 | End: 2024-01-11
Attending: STUDENT IN AN ORGANIZED HEALTH CARE EDUCATION/TRAINING PROGRAM | Admitting: STUDENT IN AN ORGANIZED HEALTH CARE EDUCATION/TRAINING PROGRAM
Payer: MEDICARE

## 2024-01-10 ENCOUNTER — APPOINTMENT (OUTPATIENT)
Dept: ELECTROPHYSIOLOGY | Facility: HOSPITAL | Age: 84
End: 2024-01-10

## 2024-01-10 VITALS
WEIGHT: 139.99 LBS | OXYGEN SATURATION: 98 % | SYSTOLIC BLOOD PRESSURE: 124 MMHG | TEMPERATURE: 98 F | RESPIRATION RATE: 16 BRPM | HEART RATE: 101 BPM | HEIGHT: 60 IN | DIASTOLIC BLOOD PRESSURE: 80 MMHG

## 2024-01-10 DIAGNOSIS — Z98.49 CATARACT EXTRACTION STATUS, UNSPECIFIED EYE: Chronic | ICD-10-CM

## 2024-01-10 DIAGNOSIS — I48.19 OTHER PERSISTENT ATRIAL FIBRILLATION: ICD-10-CM

## 2024-01-10 PROCEDURE — C1769: CPT

## 2024-01-10 PROCEDURE — 93312 ECHO TRANSESOPHAGEAL: CPT | Mod: 26

## 2024-01-10 PROCEDURE — C1759: CPT

## 2024-01-10 PROCEDURE — C1733: CPT

## 2024-01-10 PROCEDURE — C1730: CPT

## 2024-01-10 PROCEDURE — 93318 ECHO TRANSESOPHAGEAL INTRAOP: CPT

## 2024-01-10 PROCEDURE — C1731: CPT

## 2024-01-10 PROCEDURE — 93623 PRGRMD STIMJ&PACG IV RX NFS: CPT | Mod: 26

## 2024-01-10 PROCEDURE — C1887: CPT

## 2024-01-10 PROCEDURE — 93005 ELECTROCARDIOGRAM TRACING: CPT

## 2024-01-10 PROCEDURE — C9399: CPT

## 2024-01-10 PROCEDURE — C1766: CPT

## 2024-01-10 PROCEDURE — 93656 COMPRE EP EVAL ABLTJ ATR FIB: CPT

## 2024-01-10 PROCEDURE — C1894: CPT

## 2024-01-10 PROCEDURE — 93320 DOPPLER ECHO COMPLETE: CPT | Mod: 26

## 2024-01-10 PROCEDURE — 93325 DOPPLER ECHO COLOR FLOW MAPG: CPT | Mod: 26

## 2024-01-10 PROCEDURE — C1760: CPT

## 2024-01-10 PROCEDURE — 93623 PRGRMD STIMJ&PACG IV RX NFS: CPT

## 2024-01-10 RX ORDER — BENZOCAINE 10 %
1 GEL (GRAM) MUCOUS MEMBRANE ONCE
Refills: 0 | Status: COMPLETED | OUTPATIENT
Start: 2024-01-10 | End: 2024-01-10

## 2024-01-10 RX ORDER — RIVAROXABAN 15 MG-20MG
20 KIT ORAL
Refills: 0 | Status: DISCONTINUED | OUTPATIENT
Start: 2024-01-10 | End: 2024-01-11

## 2024-01-10 RX ORDER — ACETAMINOPHEN 500 MG
650 TABLET ORAL EVERY 6 HOURS
Refills: 0 | Status: DISCONTINUED | OUTPATIENT
Start: 2024-01-10 | End: 2024-01-11

## 2024-01-10 RX ORDER — LANOLIN ALCOHOL/MO/W.PET/CERES
3 CREAM (GRAM) TOPICAL AT BEDTIME
Refills: 0 | Status: DISCONTINUED | OUTPATIENT
Start: 2024-01-10 | End: 2024-01-11

## 2024-01-10 RX ORDER — PANTOPRAZOLE SODIUM 20 MG/1
40 TABLET, DELAYED RELEASE ORAL
Refills: 0 | Status: DISCONTINUED | OUTPATIENT
Start: 2024-01-10 | End: 2024-01-11

## 2024-01-10 RX ORDER — SOTALOL HCL 120 MG
80 TABLET ORAL EVERY 12 HOURS
Refills: 0 | Status: DISCONTINUED | OUTPATIENT
Start: 2024-01-10 | End: 2024-01-11

## 2024-01-10 RX ORDER — LOSARTAN POTASSIUM 100 MG/1
50 TABLET, FILM COATED ORAL DAILY
Refills: 0 | Status: DISCONTINUED | OUTPATIENT
Start: 2024-01-10 | End: 2024-01-11

## 2024-01-10 RX ADMIN — Medication 80 MILLIGRAM(S): at 17:37

## 2024-01-10 RX ADMIN — Medication 1 SPRAY(S): at 20:52

## 2024-01-10 RX ADMIN — RIVAROXABAN 20 MILLIGRAM(S): KIT at 17:37

## 2024-01-10 NOTE — DISCHARGE NOTE PROVIDER - NSDCCPTREATMENT_GEN_ALL_CORE_FT
PRINCIPAL PROCEDURE  Procedure: Cardiac ablation  Findings and Treatment: - Please start taking pantoprazole 40 mg daily for 30 days.   - You should restart your Xarelto.   - You may shower today.  - Do not drive or operate heavy machinery for 3 days.  - Do not submerge in water (example: baths, swimming) for 1 week.  - No squatting, exertional activities, or lifting anything > 10 lbs for 1 week.  - Any sudden swelling, redness, fever, discharge, or severe pain, call the Electrophysiology Office at [phone].     PRINCIPAL PROCEDURE  Procedure: Cardiac ablation  Findings and Treatment: - Please start taking pantoprazole 40 mg daily for 30 days.   - You should restart your Xarelto.   - You may shower today.  - Do not drive or operate heavy machinery for 3 days.  - Do not submerge in water (example: baths, swimming) for 1 week.  - No squatting, exertional activities, or lifting anything > 10 lbs for 1 week.  - Any sudden swelling, redness, fever, discharge, or severe pain, call the Electrophysiology Office at [phone].

## 2024-01-10 NOTE — DISCHARGE NOTE PROVIDER - HOSPITAL COURSE
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 On 1/10/24 patient underwent successful cryoablation for atrial fibrillation. Patient was monitored overnight. On POD 1 patient remains HD stable with no complaints. Patient remains in SR with no arrhythmias noted on tele. Examination of B/L common femoral venous access sites reveal a clear and dry area with no hematoma or erythema.(For PVI- Patient should continue Xarelto for thromboembolic prophylaxis). Patient is being DC home in stable condition. 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 On 1/10/24 patient underwent successful cryoablation for atrial fibrillation. Patient was monitored overnight. On POD 1 patient remains HD stable with no complaints. Patient remains in SR with no arrhythmias noted on tele. Examination of B/L common femoral venous access sites reveal a clear and dry area with no hematoma or erythema.(For PVI- Patient should continue Xarelto for thromboembolic prophylaxis). Patient is being DC home in stable condition. 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 On 1/10/24 patient underwent successful cryoablation for atrial fibrillation. Patient was monitored overnight. On POD 1 patient remains HD stable with no complaints. Patient remains in SR with no arrhythmias noted on tele. Examination of B/L common femoral venous access sites reveal a clear and dry area with no hematoma or erythema. Patient should continue Xarelto for thromboembolic prophylaxis. Patient is being DC home in stable condition. 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 On 1/10/24 patient underwent successful cryoablation for atrial fibrillation. Patient was monitored overnight. On POD 1 patient remains HD stable with no complaints. Patient remains in SR with no arrhythmias noted on tele. Examination of B/L common femoral venous access sites reveal a clear and dry area with no hematoma or erythema. Patient should continue Xarelto for thromboembolic prophylaxis. Patient is being DC home in stable condition.

## 2024-01-10 NOTE — ASU PATIENT PROFILE, ADULT - FALL HARM RISK - ATTEMPT OOB
Hitesh See MD [place], Angela, RN Angela,     Can you check in with patient on what immunosuppression she is taking.  Her medication list says mycophenolic acid 180 mg bid, prednisone 5 mg daily and sirolimus 2 mg daily.  However, she hasn't had any drug levels in over a year.  Is she is on these meds, please obtain MPA and sirolimus level with labs on Monday.     I see her next week in clinic.     Thanks.       Left pt detailed voicemail about questions regarding IS and drug levels  Orders placed for rapa and mpa levels.    No

## 2024-01-10 NOTE — PATIENT PROFILE ADULT - FALL HARM RISK - HARM RISK INTERVENTIONS
Assistance with ambulation/Assistance OOB with selected safe patient handling equipment/Communicate Risk of Fall with Harm to all staff/Monitor gait and stability/Reinforce activity limits and safety measures with patient and family/Sit up slowly, dangle for a short time, stand at bedside before walking/Tailored Fall Risk Interventions/Use of alarms - bed, chair and/or voice tab/Visual Cue: Yellow wristband and red socks/Bed in lowest position, wheels locked, appropriate side rails in place/Call bell, personal items and telephone in reach/Instruct patient to call for assistance before getting out of bed or chair/Non-slip footwear when patient is out of bed/[place] to call system/Physically safe environment - no spills, clutter or unnecessary equipment/Purposeful Proactive Rounding/Room/bathroom lighting operational, light cord in reach Assistance with ambulation/Assistance OOB with selected safe patient handling equipment/Communicate Risk of Fall with Harm to all staff/Monitor gait and stability/Reinforce activity limits and safety measures with patient and family/Sit up slowly, dangle for a short time, stand at bedside before walking/Tailored Fall Risk Interventions/Use of alarms - bed, chair and/or voice tab/Visual Cue: Yellow wristband and red socks/Bed in lowest position, wheels locked, appropriate side rails in place/Call bell, personal items and telephone in reach/Instruct patient to call for assistance before getting out of bed or chair/Non-slip footwear when patient is out of bed/[place] to call system/Physically safe environment - no spills, clutter or unnecessary equipment/Purposeful Proactive Rounding/Room/bathroom lighting operational, light cord in reach

## 2024-01-10 NOTE — DISCHARGE NOTE PROVIDER - CARE PROVIDERS DIRECT ADDRESSES
,[email or web address]

## 2024-01-10 NOTE — DISCHARGE NOTE PROVIDER - NSDCMRMEDTOKEN_GEN_ALL_CORE_FT
Actemra 162 mg/0.9 mL subcutaneous solution: subcutaneous every 3 weeks  hydroCHLOROthiazide 12.5 mg oral tablet: 1 orally  hydrocortisone 2.5% topical cream: Apply topically to affected area as needed for  itching  olmesartan 20 mg oral tablet: 1 orally  Prolia 60 mg/mL subcutaneous solution: subcutaneous every 6 months  sotalol 80 mg oral tablet: 1 tab(s) orally 2 times a day  Xarelto 20 mg oral tablet: 1 tab(s) orally once a day (in the evening)  start on 1/31/19   Actemra 162 mg/0.9 mL subcutaneous solution: subcutaneous every 3 weeks  hydroCHLOROthiazide 12.5 mg oral tablet: 1 tablet orally once a day  hydrocortisone 2.5% topical cream: Apply topically to affected area as needed for  itching  olmesartan 20 mg oral tablet: 1 orally  pantoprazole 40 mg oral delayed release tablet: 1 tab(s) orally once a day (before a meal)  Prolia 60 mg/mL subcutaneous solution: subcutaneous every 6 months  sotalol 80 mg oral tablet: 1 tab(s) orally 2 times a day  Xarelto 20 mg oral tablet: 1 tab(s) orally once a day (in the evening)

## 2024-01-10 NOTE — ASU PATIENT PROFILE, ADULT - FALL HARM RISK - UNIVERSAL INTERVENTIONS
Bed in lowest position, wheels locked, appropriate side rails in place/Call bell, personal items and telephone in reach/Instruct patient to call for assistance before getting out of bed or chair/Non-slip footwear when patient is out of bed/[place] to call system/Physically safe environment - no spills, clutter or unnecessary equipment/Purposeful Proactive Rounding/Room/bathroom lighting operational, light cord in reach Bed in lowest position, wheels locked, appropriate side rails in place/Call bell, personal items and telephone in reach/Instruct patient to call for assistance before getting out of bed or chair/Non-slip footwear when patient is out of bed/[place] to call system/Physically safe environment - no spills, clutter or unnecessary equipment/Purposeful Proactive Rounding/Room/bathroom lighting operational, light cord in reach

## 2024-01-10 NOTE — ASU PREOP CHECKLIST - LAST TOOK
THIS VISIT WAS COMPLETED VIRTUALLY USING DOXY. ME    JOON  Jena Mock is a 81 y.o. male who presents with a concern about upper respiratory infection. He was in [facility] last week and got sick 2 days into his trip. This was on Saturday 6/25. Felt intense fatigue, cough, some loose stool. Was able to make it back. COVID test was negative. [facility]. Started feeling better yesterday 7/4 but still has a lingering cough which bothers. Has a little bit of fatigue. Otherwise bodily feels much much better    PMHx:  Past Medical History:   Diagnosis Date    Hypertension        Meds:   Current Outpatient Medications   Medication Sig Dispense Refill    benzonatate (TESSALON) 200 mg capsule Take 1 Capsule by mouth three (3) times daily as needed for Cough for up to 7 days. 21 Capsule 1    metFORMIN (GLUCOPHAGE) 500 mg tablet Take 1 Tablet by mouth two (2) times daily (with meals). 180 Tablet 3    allopurinoL (ZYLOPRIM) 100 mg tablet Take 2 Tablets by mouth daily. 180 Tablet 3    sertraline (ZOLOFT) 25 mg tablet Take 1 Tablet by mouth daily. 90 Tablet 3    amLODIPine (NORVASC) 10 mg tablet Take 1 Tablet by mouth daily. 90 Tablet 3    hydroCHLOROthiazide (HYDRODIURIL) 12.5 mg tablet Take 1 Tablet by mouth daily. 90 Tablet 3    triamcinolone acetonide (KENALOG) 0.1 % topical cream Apply  to affected area two (2) times a day. use thin layer 80 g 3    lisinopriL (PRINIVIL, ZESTRIL) 10 mg tablet Take 1 Tablet by mouth daily. 90 Tablet 3    glucose blood VI test strips (OneTouch Ultra Test) strip USE TO CHECK BLOOD SUGAR TWICE A  Strip 11    clotrimazole-betamethasone (LOTRISONE) topical cream       diclofenac EC (VOLTAREN) 75 mg EC tablet Take 1 Tab by mouth two (2) times daily as needed for Pain.  60 Tab 5    indomethacin (INDOCIN) 50 mg capsule Take 1 pill TID PO for duration of gout flare 90 Cap 0    Lancets misc Check blood glucose fasting and at bed time 200 Each 3    Nebulizer Accessories kit by Nebulization route every six (6) hours as needed for Cough. 1 Kit 0       Allergies:   No Known Allergies    Smoker:  Social History     Tobacco Use   Smoking Status Former Smoker   Smokeless Tobacco Never Used       ETOH:   Social History     Substance and Sexual Activity   Alcohol Use Not Currently       FH:   Family History   Problem Relation Age of Onset    Cancer Mother     Other Father         Leukemia       ROS:   As listed in HPI. In addition:  Constitutional:   No headache, fever, fatigue, weight loss or weight gain      Cardiac:    No chest pain      Resp:   No cough or shortness of breath      Neuro   No loss of consciousness, dizziness, seizures      Physical Exam:  There were no vitals taken for this visit. GEN: No apparent distress. Alert and oriented and responds to all questions appropriately. NEUROLOGIC:  No focal neurologic deficits. Coordination and gait grossly intact. EXT: Well perfused. No edema. SKIN: No obvious rashes. Due to this being a TeleHealth evaluation, many elements of the physical examination are unable to be assessed. Assessment and Plan     URI  Symptoms lasted about 8 days as to be expected with a viral illness. Prednisone and antibiotic was tried by [facility] but did not appear to benefit  Took a codeine containing cough syrup which she found somewhat helpful. Cough is still present. This appears to be a lingering cough. Should be amenable to dextromethorphan or Tessalon. May expect a few more days and lingering cough    Diabetes  He plans to follow-up soon on diabetes. He has started taking metformin 500 mg twice daily. Blood sugars were 140 on steroid and have come down to 80      ICD-10-CM ICD-9-CM    1. URI with cough and congestion  J06.9 465.9    2. Stage 3 chronic kidney disease, unspecified whether stage 3a or 3b CKD ([facility])  N18.30 585.3    3.  Type 2 diabetes with nephropathy ([facility])  E11.21 250.40      583.81          Pursuant to the emergency declaration under the [facility] [place] [address] waiver authority and the [employer] Act, this Virtual  Visit was conducted, with patient's consent, to reduce the patient's risk of exposure to COVID-19 and provide continuity of care for an established patient. Services were provided through a video synchronous discussion virtually to substitute for in-person clinic visit. solids

## 2024-01-10 NOTE — ASU PATIENT PROFILE, ADULT - NSTOBACCONEVERSMOKERY/N_GEN_A
Scheduled procedure with Patient at  [phone] (home)   Scheduled Via: Case Request Order for  [facility]   Procedure date: 03-17   Procedure time: NA ; Did patient request this specific time? n/a    -If non-ambulatory location, select reason: Unknown  -Did patient request a specific facility other than MD's preferred facility? n/a; If so, which facility?   -If a MAC pt is scheduled at [facility], pt was notified a family member/friend is need to stay with the patient over night after their procedure: n/a    Rep Contacted?: n/a            Notified patient about receiving an animated Xin program? No    Was patient informed of COVID testing and self isolation prior to procedure?Yes;    The following have been confirmed:  Insurance name confirmed as preffered one, will be the same at time of procedure?: Yes Ins Accepted at Facility? Yes  Latex Allergy No  Diabetic No  Sleep Apnea No  Diuretic/Water pill No  Defibrillator/Pacemaker No  MRSA hx No  Blood thinners: Coumadin (Warfarin) or Plavix Yes      Aspirin No      Phentermine (diet pill) No  Constipation n/a;    Pre-Op testing required Yes, Patient informed Yes  Prep required? n/a; Briefly reviewed? n/a; Prep cost range discussed? n/a  If procedure is scheduled 7 days or less, patient was told to  prep letter?: n/a       Yes

## 2024-01-10 NOTE — DISCHARGE NOTE PROVIDER - PROVIDER TOKENS
PROVIDER:[TOKEN:[[id number]],FOLLOWUP:[1 month],ESTABLISHEDPATIENT:[T]] PROVIDER:[TOKEN:[[id number]],FOLLOWUP:[1 month],ESTABLISHEDPATIENT:[T]] PROVIDER:[TOKEN:[[id number]],FOLLOWUP:[1 month]] PROVIDER:[TOKEN:[[id number]],FOLLOWUP:[1 month]]

## 2024-01-10 NOTE — DISCHARGE NOTE PROVIDER - CARE PROVIDER_API CALL
Dago Mullins  Cardiac Electrophysiology  [address] [place] [address], [address]  [place] [address]  Phone: [phone]  Fax: [phone]  Established Patient  Follow Up Time: 1 month   Dago Mullins  Cardiac Electrophysiology  [address] [place] [address], [address]  [place] [address]  Phone: [phone]  Fax: [phone]  Established Patient  Follow Up Time: 1 month   Harman Lester  Cardiac Electrophysiology  [address] [place] [address], [address]  [place] [address]  Phone: [phone]  Fax: [phone]  Follow Up Time: 1 month   Harman Lester  Cardiac Electrophysiology  [address] [place] [address], [address]  [place] [address]  Phone: [phone]  Fax: [phone]  Follow Up Time: 1 month

## 2024-01-10 NOTE — PRE-ANESTHESIA EVALUATION ADULT - NSANTHOSAYNRD_GEN_A_CORE
denies augie/No. AUGIE screening performed.  STOP BANG Legend: 0-2 = LOW Risk; 3-4 = INTERMEDIATE Risk; 5-8 = HIGH Risk

## 2024-01-11 ENCOUNTER — TRANSCRIPTION ENCOUNTER (OUTPATIENT)
Age: 84
End: 2024-01-11

## 2024-01-11 VITALS
HEART RATE: 74 BPM | SYSTOLIC BLOOD PRESSURE: 139 MMHG | TEMPERATURE: 98 F | OXYGEN SATURATION: 97 % | RESPIRATION RATE: 18 BRPM | DIASTOLIC BLOOD PRESSURE: 63 MMHG

## 2024-01-11 PROCEDURE — 99239 HOSP IP/OBS DSCHRG MGMT >30: CPT

## 2024-01-11 PROCEDURE — 99232 SBSQ HOSP IP/OBS MODERATE 35: CPT

## 2024-01-11 PROCEDURE — 99233 SBSQ HOSP IP/OBS HIGH 50: CPT

## 2024-01-11 PROCEDURE — 93010 ELECTROCARDIOGRAM REPORT: CPT

## 2024-01-11 RX ORDER — PANTOPRAZOLE SODIUM 20 MG/1
1 TABLET, DELAYED RELEASE ORAL
Qty: 30 | Refills: 0
Start: 2024-01-11 | End: 2024-02-09

## 2024-01-11 RX ADMIN — LOSARTAN POTASSIUM 50 MILLIGRAM(S): 100 TABLET, FILM COATED ORAL at 05:50

## 2024-01-11 RX ADMIN — PANTOPRAZOLE SODIUM 40 MILLIGRAM(S): 20 TABLET, DELAYED RELEASE ORAL at 05:50

## 2024-01-11 RX ADMIN — Medication 80 MILLIGRAM(S): at 05:50

## 2024-01-11 NOTE — DISCHARGE NOTE NURSING/CASE MANAGEMENT/SOCIAL WORK - PATIENT PORTAL LINK FT
You can access the FollowMyHealth Patient Portal offered by [facility] by registering at the following website: [email or web address]. By joining [employer]’s FollowMyHealth portal, you will also be able to view your health information using other applications (apps) compatible with our system. You can access the FollowMyHealth Patient Portal offered by [facility] by registering at the following website: [email or web address]. By joining [employer]’s FollowMyHealth portal, you will also be able to view your health information using other applications (apps) compatible with our system.

## 2024-01-11 NOTE — DISCHARGE NOTE NURSING/CASE MANAGEMENT/SOCIAL WORK - NSDCPEFALRISK_GEN_ALL_CORE
For information on Fall & Injury Prevention, visit: [email or web address] OR  [email or web address] OR  https://www.cdc.gov/steadi/patient.html For information on Fall & Injury Prevention, visit: [email or web address]/[email or web address]-[email or web address]-mobility OR  [email or web address].[email or web address]/[email or web address]-tips-[email or web address]-avoid-injury OR  https://www.cdc.gov/steadi/patient.html

## 2024-01-11 NOTE — PROGRESS NOTE ADULT - SUBJECTIVE AND OBJECTIVE BOX
INTERVAL HPI/OVERNIGHT EVENTS:  Episodes of atrial tachycardia overnight, now NSR  Patient SP AF Ablation POD#1  HD stable and feeling well    MEDICATIONS  (STANDING):  hydrochlorothiazide 12.5 milliGRAM(s) Oral daily  losartan 50 milliGRAM(s) Oral daily  pantoprazole    Tablet 40 milliGRAM(s) Oral before breakfast  rivaroxaban 20 milliGRAM(s) Oral with dinner  sotalol. 80 milliGRAM(s) Oral every 12 hours    MEDICATIONS  (PRN):  acetaminophen     Tablet .. 650 milliGRAM(s) Oral every 6 hours PRN Mild Pain (1 - 3)  melatonin 3 milliGRAM(s) Oral at bedtime PRN Insomnia      Allergies    amoxicillin (Other (Mild to Mod))  sulfonamides (Unknown)  cephalexin (Unknown)  Cipro (Sedation/Somnol)  penicillin V potassium (Unknown)  naproxen (Unknown)    Intolerances        REVIEW OF SYSTEMS: No CP, palpitations, dizziness or SOB    Vital Signs Last 24 Hrs  T(C): 36.7 (11 Jan 2024 08:07), Max: 36.9 (10 Lucien 2024 15:16)  T(F): 98 (11 Jan 2024 08:07), Max: 98.4 (10 Lucien 2024 15:16)  HR: 74 (11 Jan 2024 08:07) (73 - 101)  BP: 139/63 (11 Jan 2024 08:07) (108/62 - 141/87)  BP(mean): 90 (11 Jan 2024 08:07) (81 - 107)  RR: 18 (11 Jan 2024 08:07) (15 - 20)  SpO2: 97% (11 Jan 2024 08:07) (93% - 97%)    Parameters below as of 11 Jan 2024 08:07  Patient On (Oxygen Delivery Method): room air          Physical Exam  GENERAL: In no apparent distress, well nourished, and hydrated.  EYES: EOMI, PERRLA, conjunctiva and sclera clear  NECK: Supple  HEART: Regular rate and rhythm; No murmurs, rubs, or gallops.  PULMONARY: Clear to auscultation and perfusion.  No rales, wheezing, or rhonchi bilaterally.  EXTREMITIES:  2+ Peripheral Pulses, No clubbing, cyanosis, or edema  SKIN: Suture removed from groin, no hematoma  NEUROLOGICAL: Grossly nonfocal    LABS:                RADIOLOGY & ADDITIONAL TESTS:

## 2024-01-13 NOTE — H&P PST ADULT - HISTORY OF PRESENT ILLNESS
show pt with afib - hospitalized 5 x in 18 yrs. last time  8/23- but went back into afib  now for planned procedure     PATIENT CURRENTLY DENIES CHEST PAIN   DYSURIA, OR UPPER RESPIRATORY INFECTION IN PAST 2 WEEKS  chronic PALPITATIONS,   sl sob   denies travel outside the [place] in the past 30 days  Patient denies any signs or symptoms of COVID 19 and denies contact with known positive individuals.  They have an appointment for repeat COVID testing pre-procedure and acknowledge its time and place.  They were instructed to quarantine pre-procedure, practice exposure control measures, continue to self-monitor and report any concerns to their proceduralist.  pt advised self quarantine till day of procedure    Anesthesia Alert  NO--Difficult Airway  NO--History of neck surgery or radiation  NO--Limited ROM of neck  NO--History of Malignant hyperthermia  NO--No personal or family history of Pseudocholinesterase deficiency.  NO--Prior Anesthesia Complication  NO--Latex Allergy  NO--Loose teeth  NO--History of Rheumatoid Arthritis  Bleeding risk xaralto   NO--CAMILLA  NO--Other_____    PT DENIES ANY RASHES, ABRASION, OR OPEN WOUNDS OR CUTS    AS PER THE PT, THIS IS HIS/HER COMPLETE MEDICAL AND SURGICAL HX, INCLUDING MEDICATIONS PRESCRIBED AND OVER THE COUNTER    Patient verbalized understanding of instructions and was given the opportunity to ask questions and have them answered.    pt denies any suicidal ideation or thoughts, pt states not a threat to self or others    Other persistent atrial fibrillation    Encounter for other preprocedural examination    Atrial fibrillation    HTN (hypertension)    History of temporal arteritis    History of temporal artery biopsy    Osteoporosis    SysAdmin_VstLnk    Revised Cardiac Risk Index for Pre-Operative Risk from [employer]  on 12/18/2023  ** All calculations should be rechecked by clinician prior to use **    RESULT SUMMARY:  1 points  Class II Risk    6.0 %  30-day risk of death, MI, or cardiac arrest    From Duceppe 2017. These numbers are higher than those from the original study (Pravin 1999). See Evidence for details.      INPUTS:  Elevated-risk surgery —> 1 = Yes  History of ischemic heart disease —> 0 = No  History of congestive heart failure —> 0 = No  History of cerebrovascular disease —> 0 = No  Pre-operative treatment with insulin —> 0 = No  Pre-operative creatinine >2 mg/dL / 176.8 µmol/L —> 0 = No    Duke Activity Status Index (DASI) from [email or web address]      RESULT SUMMARY:  24.95 points  The higher the score (maximum 58.2), the higher the functional status.    5.81 METs        INPUTS:  Take care of self —> 2.75 = Yes  Walk indoors —> 1.75 = Yes  Walk 1&ndash;2 blocks on level ground —> 2.75 = Yes  Climb a flight of stairs or walk up a hill —> 5.5 = Yes  Run a short distance —> 0 = No  Do light work around the house —> 2.7 = Yes  Do moderate work around the house —> 3.5 = Yes  Do heavy work around the house —> 0 = No  Do yardwork —> 0 = No  Have sexual relations —> 0 = No  Participate in moderate recreational activities —> 6 = Yes  Participate in strenuous sports —> 0 = No   pt with afib - hospitalized 5 x in 18 yrs. last time  8/23- but went back into afib  now for planned procedure     PATIENT CURRENTLY DENIES CHEST PAIN   DYSURIA, OR UPPER RESPIRATORY INFECTION IN PAST 2 WEEKS  chronic PALPITATIONS,   sl sob   denies travel outside the [place] in the past 30 days  Patient denies any signs or symptoms of COVID 19 and denies contact with known positive individuals.  They have an appointment for repeat COVID testing pre-procedure and acknowledge its time and place.  They were instructed to quarantine pre-procedure, practice exposure control measures, continue to self-monitor and report any concerns to their proceduralist.  pt advised self quarantine till day of procedure    Anesthesia Alert  NO--Difficult Airway  NO--History of neck surgery or radiation  NO--Limited ROM of neck  NO--History of Malignant hyperthermia  NO--No personal or family history of Pseudocholinesterase deficiency.  NO--Prior Anesthesia Complication  NO--Latex Allergy  NO--Loose teeth  NO--History of Rheumatoid Arthritis  Bleeding risk xaralto   NO--CAMILLA  NO--Other_____    PT DENIES ANY RASHES, ABRASION, OR OPEN WOUNDS OR CUTS    AS PER THE PT, THIS IS HIS/HER COMPLETE MEDICAL AND SURGICAL HX, INCLUDING MEDICATIONS PRESCRIBED AND OVER THE COUNTER    Patient verbalized understanding of instructions and was given the opportunity to ask questions and have them answered.    pt denies any suicidal ideation or thoughts, pt states not a threat to self or others    Other persistent atrial fibrillation    Encounter for other preprocedural examination    Atrial fibrillation    HTN (hypertension)    History of temporal arteritis    History of temporal artery biopsy    Osteoporosis    SysAdmin_VstLnk    Revised Cardiac Risk Index for Pre-Operative Risk from [employer]  on 12/18/2023  ** All calculations should be rechecked by clinician prior to use **    RESULT SUMMARY:  1 points  Class II Risk    6.0 %  30-day risk of death, MI, or cardiac arrest    From Duceppe 2017. These numbers are higher than those from the original study (Pravin 1999). See Evidence for details.      INPUTS:  Elevated-risk surgery —> 1 = Yes  History of ischemic heart disease —> 0 = No  History of congestive heart failure —> 0 = No  History of cerebrovascular disease —> 0 = No  Pre-operative treatment with insulin —> 0 = No  Pre-operative creatinine >2 mg/dL / 176.8 µmol/L —> 0 = No    Duke Activity Status Index (DASI) from [employer]      RESULT SUMMARY:  24.95 points  The higher the score (maximum 58.2), the higher the functional status.    5.81 METs        INPUTS:  Take care of self —> 2.75 = Yes  Walk indoors —> 1.75 = Yes  Walk 1&ndash;2 blocks on level ground —> 2.75 = Yes  Climb a flight of stairs or walk up a hill —> 5.5 = Yes  Run a short distance —> 0 = No  Do light work around the house —> 2.7 = Yes  Do moderate work around the house —> 3.5 = Yes  Do heavy work around the house —> 0 = No  Do yardwork —> 0 = No  Have sexual relations —> 0 = No  Participate in moderate recreational activities —> 6 = Yes  Participate in strenuous sports —> 0 = No

## 2024-01-17 ENCOUNTER — INPATIENT (INPATIENT)
Facility: HOSPITAL | Age: 84
LOS: 12 days | Discharge: HOME HEALTH | DRG: 389 | End: 2024-01-30
Attending: INTERNAL MEDICINE | Admitting: HOSPITALIST
Payer: MEDICARE

## 2024-01-17 VITALS
DIASTOLIC BLOOD PRESSURE: 68 MMHG | HEART RATE: 103 BPM | WEIGHT: 169.98 LBS | TEMPERATURE: 98 F | SYSTOLIC BLOOD PRESSURE: 124 MMHG | OXYGEN SATURATION: 96 % | HEIGHT: 60 IN | RESPIRATION RATE: 18 BRPM

## 2024-01-17 DIAGNOSIS — Z98.49 CATARACT EXTRACTION STATUS, UNSPECIFIED EYE: Chronic | ICD-10-CM

## 2024-01-17 DIAGNOSIS — K52.3 INDETERMINATE COLITIS: ICD-10-CM

## 2024-01-17 DIAGNOSIS — I48.91 UNSPECIFIED ATRIAL FIBRILLATION: ICD-10-CM

## 2024-01-17 PROBLEM — Z87.39 PERSONAL HISTORY OF OTHER DISEASES OF THE MUSCULOSKELETAL SYSTEM AND CONNECTIVE TISSUE: Chronic | Status: ACTIVE | Noted: 2023-12-18

## 2024-01-17 LAB
ALBUMIN SERPL ELPH-MCNC: 4 G/DL — SIGNIFICANT CHANGE UP (ref 3.5–5.2)
ALP SERPL-CCNC: 40 U/L — SIGNIFICANT CHANGE UP (ref 30–115)
ALT FLD-CCNC: 20 U/L — SIGNIFICANT CHANGE UP (ref 0–41)
ANION GAP SERPL CALC-SCNC: 10 MMOL/L — SIGNIFICANT CHANGE UP (ref 7–14)
ANION GAP SERPL CALC-SCNC: 12 MMOL/L — SIGNIFICANT CHANGE UP (ref 7–14)
APPEARANCE UR: CLEAR — SIGNIFICANT CHANGE UP
AST SERPL-CCNC: 20 U/L — SIGNIFICANT CHANGE UP (ref 0–41)
BASOPHILS # BLD AUTO: 0.03 K/UL — SIGNIFICANT CHANGE UP (ref 0–0.2)
BASOPHILS NFR BLD AUTO: 0.6 % — SIGNIFICANT CHANGE UP (ref 0–1)
BILIRUB SERPL-MCNC: 0.4 MG/DL — SIGNIFICANT CHANGE UP (ref 0.2–1.2)
BILIRUB UR-MCNC: NEGATIVE — SIGNIFICANT CHANGE UP
BUN SERPL-MCNC: 15 MG/DL — SIGNIFICANT CHANGE UP (ref 10–20)
BUN SERPL-MCNC: 19 MG/DL — SIGNIFICANT CHANGE UP (ref 10–20)
CALCIUM SERPL-MCNC: 9.2 MG/DL — SIGNIFICANT CHANGE UP (ref 8.4–10.5)
CALCIUM SERPL-MCNC: 9.4 MG/DL — SIGNIFICANT CHANGE UP (ref 8.4–10.5)
CHLORIDE SERPL-SCNC: 87 MMOL/L — LOW (ref 98–110)
CHLORIDE SERPL-SCNC: 91 MMOL/L — LOW (ref 98–110)
CO2 SERPL-SCNC: 28 MMOL/L — SIGNIFICANT CHANGE UP (ref 17–32)
CO2 SERPL-SCNC: 29 MMOL/L — SIGNIFICANT CHANGE UP (ref 17–32)
COLOR SPEC: YELLOW — SIGNIFICANT CHANGE UP
CREAT SERPL-MCNC: 0.7 MG/DL — SIGNIFICANT CHANGE UP (ref 0.7–1.5)
CREAT SERPL-MCNC: 1.3 MG/DL — SIGNIFICANT CHANGE UP (ref 0.7–1.5)
DIFF PNL FLD: NEGATIVE — SIGNIFICANT CHANGE UP
EGFR: 41 ML/MIN/1.73M2 — LOW
EGFR: 87 ML/MIN/1.73M2 — SIGNIFICANT CHANGE UP
EOSINOPHIL # BLD AUTO: 0.08 K/UL — SIGNIFICANT CHANGE UP (ref 0–0.7)
EOSINOPHIL NFR BLD AUTO: 1.6 % — SIGNIFICANT CHANGE UP (ref 0–8)
GLUCOSE SERPL-MCNC: 109 MG/DL — HIGH (ref 70–99)
GLUCOSE SERPL-MCNC: 95 MG/DL — SIGNIFICANT CHANGE UP (ref 70–99)
GLUCOSE UR QL: NEGATIVE MG/DL — SIGNIFICANT CHANGE UP
HCT VFR BLD CALC: 34.3 % — LOW (ref 37–47)
HGB BLD-MCNC: 11.9 G/DL — LOW (ref 12–16)
IMM GRANULOCYTES NFR BLD AUTO: 0.2 % — SIGNIFICANT CHANGE UP (ref 0.1–0.3)
KETONES UR-MCNC: NEGATIVE MG/DL — SIGNIFICANT CHANGE UP
LACTATE SERPL-SCNC: 1.1 MMOL/L — SIGNIFICANT CHANGE UP (ref 0.7–2)
LEUKOCYTE ESTERASE UR-ACNC: NEGATIVE — SIGNIFICANT CHANGE UP
LIDOCAIN IGE QN: 47 U/L — SIGNIFICANT CHANGE UP (ref 7–60)
LYMPHOCYTES # BLD AUTO: 0.9 K/UL — LOW (ref 1.2–3.4)
LYMPHOCYTES # BLD AUTO: 17.7 % — LOW (ref 20.5–51.1)
MCHC RBC-ENTMCNC: 32.3 PG — HIGH (ref 27–31)
MCHC RBC-ENTMCNC: 34.7 G/DL — SIGNIFICANT CHANGE UP (ref 32–37)
MCV RBC AUTO: 93.2 FL — SIGNIFICANT CHANGE UP (ref 81–99)
MONOCYTES # BLD AUTO: 0.75 K/UL — HIGH (ref 0.1–0.6)
MONOCYTES NFR BLD AUTO: 14.8 % — HIGH (ref 1.7–9.3)
NEUTROPHILS # BLD AUTO: 3.31 K/UL — SIGNIFICANT CHANGE UP (ref 1.4–6.5)
NEUTROPHILS NFR BLD AUTO: 65.1 % — SIGNIFICANT CHANGE UP (ref 42.2–75.2)
NITRITE UR-MCNC: NEGATIVE — SIGNIFICANT CHANGE UP
NRBC # BLD: 0 /100 WBCS — SIGNIFICANT CHANGE UP (ref 0–0)
PH UR: 7 — SIGNIFICANT CHANGE UP (ref 5–8)
PLATELET # BLD AUTO: 207 K/UL — SIGNIFICANT CHANGE UP (ref 130–400)
PMV BLD: 8.7 FL — SIGNIFICANT CHANGE UP (ref 7.4–10.4)
POTASSIUM SERPL-MCNC: 3.4 MMOL/L — LOW (ref 3.5–5)
POTASSIUM SERPL-MCNC: 4 MMOL/L — SIGNIFICANT CHANGE UP (ref 3.5–5)
POTASSIUM SERPL-SCNC: 3.4 MMOL/L — LOW (ref 3.5–5)
POTASSIUM SERPL-SCNC: 4 MMOL/L — SIGNIFICANT CHANGE UP (ref 3.5–5)
PROT SERPL-MCNC: 6.1 G/DL — SIGNIFICANT CHANGE UP (ref 6–8)
PROT UR-MCNC: NEGATIVE MG/DL — SIGNIFICANT CHANGE UP
RBC # BLD: 3.68 M/UL — LOW (ref 4.2–5.4)
RBC # FLD: 12.5 % — SIGNIFICANT CHANGE UP (ref 11.5–14.5)
SODIUM SERPL-SCNC: 126 MMOL/L — LOW (ref 135–146)
SODIUM SERPL-SCNC: 131 MMOL/L — LOW (ref 135–146)
SP GR SPEC: 1.01 — SIGNIFICANT CHANGE UP (ref 1–1.03)
UROBILINOGEN FLD QL: 0.2 MG/DL — SIGNIFICANT CHANGE UP (ref 0.2–1)
WBC # BLD: 5.08 K/UL — SIGNIFICANT CHANGE UP (ref 4.8–10.8)
WBC # FLD AUTO: 5.08 K/UL — SIGNIFICANT CHANGE UP (ref 4.8–10.8)

## 2024-01-17 PROCEDURE — 99285 EMERGENCY DEPT VISIT HI MDM: CPT | Mod: FS

## 2024-01-17 PROCEDURE — 97116 GAIT TRAINING THERAPY: CPT | Mod: GP

## 2024-01-17 PROCEDURE — 97110 THERAPEUTIC EXERCISES: CPT | Mod: GP

## 2024-01-17 PROCEDURE — 74018 RADEX ABDOMEN 1 VIEW: CPT

## 2024-01-17 PROCEDURE — 80048 BASIC METABOLIC PNL TOTAL CA: CPT

## 2024-01-17 PROCEDURE — 74177 CT ABD & PELVIS W/CONTRAST: CPT | Mod: 26,MA

## 2024-01-17 PROCEDURE — 85027 COMPLETE CBC AUTOMATED: CPT

## 2024-01-17 PROCEDURE — 97162 PT EVAL MOD COMPLEX 30 MIN: CPT | Mod: GP

## 2024-01-17 PROCEDURE — 83735 ASSAY OF MAGNESIUM: CPT

## 2024-01-17 PROCEDURE — 85025 COMPLETE CBC W/AUTO DIFF WBC: CPT

## 2024-01-17 PROCEDURE — 71045 X-RAY EXAM CHEST 1 VIEW: CPT

## 2024-01-17 PROCEDURE — 36415 COLL VENOUS BLD VENIPUNCTURE: CPT

## 2024-01-17 PROCEDURE — 84443 ASSAY THYROID STIM HORMONE: CPT

## 2024-01-17 PROCEDURE — 80053 COMPREHEN METABOLIC PANEL: CPT

## 2024-01-17 PROCEDURE — 99223 1ST HOSP IP/OBS HIGH 75: CPT

## 2024-01-17 PROCEDURE — 84100 ASSAY OF PHOSPHORUS: CPT

## 2024-01-17 PROCEDURE — 93306 TTE W/DOPPLER COMPLETE: CPT

## 2024-01-17 PROCEDURE — 93005 ELECTROCARDIOGRAM TRACING: CPT

## 2024-01-17 RX ORDER — LANOLIN ALCOHOL/MO/W.PET/CERES
3 CREAM (GRAM) TOPICAL AT BEDTIME
Refills: 0 | Status: DISCONTINUED | OUTPATIENT
Start: 2024-01-17 | End: 2024-01-30

## 2024-01-17 RX ORDER — SOTALOL HCL 120 MG
80 TABLET ORAL ONCE
Refills: 0 | Status: COMPLETED | OUTPATIENT
Start: 2024-01-17 | End: 2024-01-17

## 2024-01-17 RX ORDER — PANTOPRAZOLE SODIUM 20 MG/1
40 TABLET, DELAYED RELEASE ORAL
Refills: 0 | Status: DISCONTINUED | OUTPATIENT
Start: 2024-01-17 | End: 2024-01-30

## 2024-01-17 RX ORDER — RIVAROXABAN 15 MG-20MG
20 KIT ORAL
Refills: 0 | Status: DISCONTINUED | OUTPATIENT
Start: 2024-01-17 | End: 2024-01-30

## 2024-01-17 RX ORDER — SENNA PLUS 8.6 MG/1
2 TABLET ORAL AT BEDTIME
Refills: 0 | Status: DISCONTINUED | OUTPATIENT
Start: 2024-01-17 | End: 2024-01-30

## 2024-01-17 RX ORDER — INFLUENZA VIRUS VACCINE 15; 15; 15; 15 UG/.5ML; UG/.5ML; UG/.5ML; UG/.5ML
0.7 SUSPENSION INTRAMUSCULAR ONCE
Refills: 0 | Status: DISCONTINUED | OUTPATIENT
Start: 2024-01-17 | End: 2024-01-30

## 2024-01-17 RX ORDER — METRONIDAZOLE 500 MG
500 TABLET ORAL ONCE
Refills: 0 | Status: COMPLETED | OUTPATIENT
Start: 2024-01-17 | End: 2024-01-17

## 2024-01-17 RX ORDER — SODIUM CHLORIDE 9 MG/ML
1000 INJECTION INTRAMUSCULAR; INTRAVENOUS; SUBCUTANEOUS
Refills: 0 | Status: DISCONTINUED | OUTPATIENT
Start: 2024-01-17 | End: 2024-01-20

## 2024-01-17 RX ORDER — POLYETHYLENE GLYCOL 3350 17 G/17G
17 POWDER, FOR SOLUTION ORAL DAILY
Refills: 0 | Status: DISCONTINUED | OUTPATIENT
Start: 2024-01-17 | End: 2024-01-19

## 2024-01-17 RX ORDER — LOSARTAN POTASSIUM 100 MG/1
50 TABLET, FILM COATED ORAL DAILY
Refills: 0 | Status: DISCONTINUED | OUTPATIENT
Start: 2024-01-17 | End: 2024-01-30

## 2024-01-17 RX ORDER — SOTALOL HCL 120 MG
80 TABLET ORAL EVERY 12 HOURS
Refills: 0 | Status: DISCONTINUED | OUTPATIENT
Start: 2024-01-17 | End: 2024-01-30

## 2024-01-17 RX ORDER — ACETAMINOPHEN 500 MG
650 TABLET ORAL EVERY 6 HOURS
Refills: 0 | Status: DISCONTINUED | OUTPATIENT
Start: 2024-01-17 | End: 2024-01-30

## 2024-01-17 RX ORDER — LACTULOSE 10 G/15ML
15 SOLUTION ORAL ONCE
Refills: 0 | Status: DISCONTINUED | OUTPATIENT
Start: 2024-01-17 | End: 2024-01-20

## 2024-01-17 RX ADMIN — SENNA PLUS 2 TABLET(S): 8.6 TABLET ORAL at 21:46

## 2024-01-17 RX ADMIN — Medication 80 MILLIGRAM(S): at 21:46

## 2024-01-17 RX ADMIN — SODIUM CHLORIDE 150 MILLILITER(S): 9 INJECTION INTRAMUSCULAR; INTRAVENOUS; SUBCUTANEOUS at 21:46

## 2024-01-17 RX ADMIN — Medication 100 MILLIGRAM(S): at 14:28

## 2024-01-17 RX ADMIN — SODIUM CHLORIDE 150 MILLILITER(S): 9 INJECTION INTRAMUSCULAR; INTRAVENOUS; SUBCUTANEOUS at 14:28

## 2024-01-17 NOTE — ED PROVIDER NOTE - CARE PLAN
1 Principal Discharge DX:	Stercoral colitis  Secondary Diagnosis:	Constipation  Secondary Diagnosis:	Afib

## 2024-01-17 NOTE — ED PROVIDER NOTE - CLINICAL SUMMARY MEDICAL DECISION MAKING FREE TEXT BOX
83-year-old female, past medical history of A-fib on Xarelto, mild pulmonary hypertension, recent A-fib ablation 1/10, presenting for 1 week of generalized abdominal pain and constipation, not alleviated by suppository, no aggravating factors.  Denies fevers, chills, vomiting, chest pain, shortness of breath, rectal bleeding, urinary symptoms.  Patient states that she tried a suppository last night and had some brown water discharge but no actual bowel movement.  Well-appearing on exam.  Abdomen soft nondistended nontender.  Soft brown stool on rectal but no palpable stool in the vault.  Labs ordered and reviewed.  Imaging ordered and reviewed. 83-year-old female, past medical history of A-fib on Xarelto, mild pulmonary hypertension, recent A-fib ablation 1/10, presenting for 1 week of generalized abdominal pain and constipation, not alleviated by suppository, no aggravating factors.  Denies fevers, chills, vomiting, chest pain, shortness of breath, rectal bleeding, urinary symptoms.  Patient states that she tried a suppository last night and had some brown water discharge but no actual bowel movement.  Well-appearing on exam.  Abdomen soft nondistended nontender.  Soft brown stool on rectal but no palpable stool in the vault.  Labs ordered and reviewed.  Imaging ordered and reviewed. CT shows stercoral proctitis. Discussed results with patient. Medications given and effects reassessed. Given return precautions and follow up outpatient. Patient comfortable with plan . 83-year-old female, past medical history of A-fib on Xarelto, mild pulmonary hypertension, recent A-fib ablation 1/10, presenting for 1 week of generalized abdominal pain and constipation, not alleviated by suppository, no aggravating factors.  Denies fevers, chills, vomiting, chest pain, shortness of breath, rectal bleeding, urinary symptoms.  Patient states that she tried a suppository last night and had some brown water discharge but no actual bowel movement.  Well-appearing on exam.  Abdomen soft nondistended nontender.  Soft brown stool on rectal but no palpable stool in the vault.  Labs ordered and reviewed.  Imaging ordered and reviewed. CT shows stercoral proctitis. Discussed results with patient. Medications given and effects reassessed. Escalation to admission considered. Patient requiring inpatient hospitalization.

## 2024-01-17 NOTE — PATIENT PROFILE ADULT - NSPROIMPLANTSMEDDEV_GEN_A_NUR
Stay well hydrated  Take ibuprofen every 6 hours (you were given a dose for ibuprofen at 4:45pm today), take tylenol every 4 hours  Follow up with your PCP in 3 days, return to the ER if you develop fever, increased pain, weakness, numbness, tingling vision changes or other new or worsening symptoms.
None
josie

## 2024-01-17 NOTE — ED PROVIDER NOTE - OBJECTIVE STATEMENT
patient c/o constipation for 1 week since cardiac ablation done,  took suppository Sunday with small amount of watery brown BM,, C/o lower abdominal cramping, mild nausea, and decreased appetite, no fever, no vomiting, no chest pain, no SOB

## 2024-01-17 NOTE — H&P ADULT - NSHPPHYSICALEXAM_GEN_ALL_CORE
Vital Signs Last 24 Hrs  T(C): 36.7 (17 Jan 2024 10:28), Max: 36.7 (17 Jan 2024 10:28)  T(F): 98 (17 Jan 2024 10:28), Max: 98 (17 Jan 2024 10:28)  HR: 103 (17 Jan 2024 10:28) (103 - 103)  BP: 124/68 (17 Jan 2024 10:28) (124/68 - 124/68)  RR: 18 (17 Jan 2024 10:28) (18 - 18)  SpO2: 96% (17 Jan 2024 10:28) (96% - 96%)    Parameters below as of 17 Jan 2024 10:28  Patient On (Oxygen Delivery Method): room air    GENERAL:  82y/o Female NAD, resting comfortably.  EYES: EOMI, conjunctiva and sclera clear  CHEST/LUNG: Clear to auscultation bilaterally; No wheeze, rhonchi, or rales  HEART: Irregular, S1&S2  ABDOMEN: generalized lower abdominal ttp, ND, Bowel sounds present  EXTREMITIES:   Peripheral Pulses Present, No clubbing, no cyanosis, or no edema, no calf tenderness  PSYCH: AAOx3

## 2024-01-17 NOTE — PATIENT PROFILE ADULT - NSPROHMSYMPCOND_GEN_A_NUR
63 yo male s/p a Left M1 occlusion s/p TNK and cerebral angiogram for left M1 mechanical thrombectomy, for which neurosurgery was consulted for a hemicraniectomy watch     Plan:  - No role acute neurosurgical intervention at this time  - Medical management with hyperosmolar solutions, patient currently on 2% NaCl and Salt tabs 1g TID  - BMP q8 Na goal 140-150  - Ok for Stroke ppx ASA 81mg and Lovenox  - Rpt CTH in AM  - Medical management per primary team NeuroICU   - Neurosurgery will continue to follow. Further plan pending am rounds with attending      none

## 2024-01-17 NOTE — H&P ADULT - NS ATTEND AMEND GEN_ALL_CORE FT
pt seen and examined - all results/imaging reviewed independently    # fecal impaction with stercoral colitis - symptomatic treatement - was unable to disimpact in ER  - senna, mirilax, tap water enema, lactulose x1  - calcium normal   - GI eval if endoscopic retrieval needed if failure of medications    #mild asymptomatic hyponatremia - hold HCTZ for now     # chronic afib - sp multiple DCCV failed, sp cryoablation 1 week ago - back in afib now - alerted EPS team at [facility] for follow up post dc  cont xarelto , sotolol rate controlled

## 2024-01-17 NOTE — ED ADULT NURSE NOTE - NSFALLRISKFACTORS_ED_ALL_ED
ambulated with steady gait to bed and chair, able to self position and stand independently/No indicators present

## 2024-01-17 NOTE — H&P ADULT - NSHPSOCIALHISTORY_GEN_ALL_CORE
Patient former smoker, quit 30 years ago.  Denies drinking or any illicit drug use  Mostly independent

## 2024-01-17 NOTE — H&P ADULT - ASSESSMENT
Patient is an 82yo F w. pmhx of AFib on Xarelto s/p ablation one week ago, pHTN, Temporal arteritis, Osteoporosis presenting with complaints of constipation since last week Tuesday 01/09 being admitted for further management.      #Constipation  #Stercoral Colitis   - Afebrile, WBC WNL,   - CT AP: 1.  Stool filled distended rectum measuring 7.5 cm in diameter with mild   surrounding inflammatory changes, suspicious for developing stercoral   proctitis. Large diffuse colonic stool burden.  2.  Evidence of bibasilar infectious/inflammatory small airways disease.  - c/p NS bolus and IV Flagyl in ED   - Tap water enemas, 2 tabs senna at bedtime, Miralax daily, lactulose x1  - GI Consult   - FU repeat BMP     #Permanent Atrial Fibrillation  - c/p ablation one week ago  - EKG: AFib, patient asymptomatic   - c/w Xarelto and sotalol   - AM ECG     #pHTN  - c/w olmesartan   - will hold hctz because of hyponatremia     #Hx of temporal arteritis  - c/w actemra injxn [id number] o/p    #Hx of osteoporosis   - c/w prolia injxn q6months o/p     Diet: High fiber   Activity: AAT   VTE PPX: Xarelto  Code Status: Full Code       Plan Discussed and approved by attending on call.

## 2024-01-17 NOTE — ED PROVIDER NOTE - CPE EDP GASTRO NORM
Hi Marylee:  You do still have an infection,and the culture results are back.  Where should I sent antibiotics to? I can send to any [facility] or [facility] in the country.  Best,  Dr. Carolina Pulliam MD / [facility]  
normal...

## 2024-01-17 NOTE — ED ADULT NURSE REASSESSMENT NOTE - NS ED NURSE REASSESS COMMENT FT1
Pt very nasty to staff.  Complaining about waiting for a room and food spoken to by Assit Manager NIKKI Gilliam.  Pt continues asking me if anyone is home and following me around unit to complain.

## 2024-01-17 NOTE — H&P ADULT - NSHPLABSRESULTS_GEN_ALL_CORE
11.9   5.08  )-----------( 207      ( 2024 10:45 )             34.3           126<L>  |  87<L>  |  19  ----------------------------<  95  4.0   |  29  |  0.7    Ca    9.4      2024 10:45    TPro  6.1  /  Alb  4.0  /  TBili  0.4  /  DBili  x   /  AST  20  /  ALT  20  /  AlkPhos  40                Urinalysis Basic - ( 2024 12:01 )    Color: Yellow / Appearance: Clear / S.011 / pH: x  Gluc: x / Ketone: Negative mg/dL  / Bili: Negative / Urobili: 0.2 mg/dL   Blood: x / Protein: Negative mg/dL / Nitrite: Negative   Leuk Esterase: Negative / RBC: x / WBC x   Sq Epi: x / Non Sq Epi: x / Bacteria: x            Lactate Trend   @ 10:45 Lactate:1.1       < from: CT Abdomen and Pelvis w/ IV Cont (24 @ 12:30) >  IMPRESSION:  1.  Stool filled distended rectum measuring 7.5 cm in diameter with mild   surrounding inflammatory changes, suspicious for developing stercoral   proctitis. Large diffuse colonic stool burden.  2.  Evidence of bibasilar infectious/inflammatory small airways disease.  --- End of Report ---

## 2024-01-17 NOTE — ED ADULT NURSE NOTE - NSFALLUNIVINTERV_ED_ALL_ED
Statement Selected Statement Selected Bed/Stretcher in lowest position, wheels locked, appropriate side rails in place/Call bell, personal items and telephone in reach/Instruct patient to call for assistance before getting out of bed/chair/stretcher/Non-slip footwear applied when patient is off stretcher/Jack to call system/Physically safe environment - no spills, clutter or unnecessary equipment/Purposeful proactive rounding/Room/bathroom lighting operational, light cord in reach

## 2024-01-18 LAB
ANION GAP SERPL CALC-SCNC: 8 MMOL/L — SIGNIFICANT CHANGE UP (ref 7–14)
BUN SERPL-MCNC: 14 MG/DL — SIGNIFICANT CHANGE UP (ref 10–20)
CALCIUM SERPL-MCNC: 8.3 MG/DL — LOW (ref 8.4–10.5)
CHLORIDE SERPL-SCNC: 98 MMOL/L — SIGNIFICANT CHANGE UP (ref 98–110)
CO2 SERPL-SCNC: 28 MMOL/L — SIGNIFICANT CHANGE UP (ref 17–32)
CREAT SERPL-MCNC: 0.6 MG/DL — LOW (ref 0.7–1.5)
EGFR: 90 ML/MIN/1.73M2 — SIGNIFICANT CHANGE UP
GLUCOSE SERPL-MCNC: 104 MG/DL — HIGH (ref 70–99)
HCT VFR BLD CALC: 32.1 % — LOW (ref 37–47)
HGB BLD-MCNC: 11.1 G/DL — LOW (ref 12–16)
MAGNESIUM SERPL-MCNC: 2 MG/DL — SIGNIFICANT CHANGE UP (ref 1.8–2.4)
MCHC RBC-ENTMCNC: 32.3 PG — HIGH (ref 27–31)
MCHC RBC-ENTMCNC: 34.6 G/DL — SIGNIFICANT CHANGE UP (ref 32–37)
MCV RBC AUTO: 93.3 FL — SIGNIFICANT CHANGE UP (ref 81–99)
NRBC # BLD: 0 /100 WBCS — SIGNIFICANT CHANGE UP (ref 0–0)
PHOSPHATE SERPL-MCNC: 3.3 MG/DL — SIGNIFICANT CHANGE UP (ref 2.1–4.9)
PLATELET # BLD AUTO: 188 K/UL — SIGNIFICANT CHANGE UP (ref 130–400)
PMV BLD: 8.9 FL — SIGNIFICANT CHANGE UP (ref 7.4–10.4)
POTASSIUM SERPL-MCNC: 4.2 MMOL/L — SIGNIFICANT CHANGE UP (ref 3.5–5)
POTASSIUM SERPL-SCNC: 4.2 MMOL/L — SIGNIFICANT CHANGE UP (ref 3.5–5)
RBC # BLD: 3.44 M/UL — LOW (ref 4.2–5.4)
RBC # FLD: 12.8 % — SIGNIFICANT CHANGE UP (ref 11.5–14.5)
SODIUM SERPL-SCNC: 134 MMOL/L — LOW (ref 135–146)
WBC # BLD: 3.05 K/UL — LOW (ref 4.8–10.8)
WBC # FLD AUTO: 3.05 K/UL — LOW (ref 4.8–10.8)

## 2024-01-18 PROCEDURE — 99233 SBSQ HOSP IP/OBS HIGH 50: CPT

## 2024-01-18 PROCEDURE — 99222 1ST HOSP IP/OBS MODERATE 55: CPT

## 2024-01-18 PROCEDURE — 93010 ELECTROCARDIOGRAM REPORT: CPT

## 2024-01-18 RX ORDER — SALICYLIC ACID 0.5 %
1 CLEANSER (GRAM) TOPICAL EVERY 6 HOURS
Refills: 0 | Status: DISCONTINUED | OUTPATIENT
Start: 2024-01-18 | End: 2024-01-30

## 2024-01-18 RX ORDER — LACTULOSE 10 G/15ML
20 SOLUTION ORAL ONCE
Refills: 0 | Status: COMPLETED | OUTPATIENT
Start: 2024-01-18 | End: 2024-01-18

## 2024-01-18 RX ADMIN — Medication 1 APPLICATION(S): at 23:13

## 2024-01-18 RX ADMIN — RIVAROXABAN 20 MILLIGRAM(S): KIT at 21:19

## 2024-01-18 RX ADMIN — PANTOPRAZOLE SODIUM 40 MILLIGRAM(S): 20 TABLET, DELAYED RELEASE ORAL at 05:17

## 2024-01-18 RX ADMIN — Medication 80 MILLIGRAM(S): at 05:17

## 2024-01-18 RX ADMIN — LOSARTAN POTASSIUM 50 MILLIGRAM(S): 100 TABLET, FILM COATED ORAL at 05:17

## 2024-01-18 RX ADMIN — POLYETHYLENE GLYCOL 3350 17 GRAM(S): 17 POWDER, FOR SOLUTION ORAL at 13:31

## 2024-01-18 RX ADMIN — SODIUM CHLORIDE 150 MILLILITER(S): 9 INJECTION INTRAMUSCULAR; INTRAVENOUS; SUBCUTANEOUS at 13:30

## 2024-01-18 RX ADMIN — LACTULOSE 20 GRAM(S): 10 SOLUTION ORAL at 11:51

## 2024-01-18 RX ADMIN — SODIUM CHLORIDE 150 MILLILITER(S): 9 INJECTION INTRAMUSCULAR; INTRAVENOUS; SUBCUTANEOUS at 05:18

## 2024-01-18 RX ADMIN — SENNA PLUS 2 TABLET(S): 8.6 TABLET ORAL at 21:19

## 2024-01-18 RX ADMIN — Medication 80 MILLIGRAM(S): at 21:18

## 2024-01-18 NOTE — PROGRESS NOTE ADULT - ASSESSMENT
# fecal impaction with stercoral colitis - symptomatic treatement - was unable to disimpact in ER  - senna, mirilax, tap water enema, lactulose x1   -refused enema for now - will consider it again  -passing flatus  - calcium normal   - GI eval if endoscopic retrieval needed if failure of medications    #mild asymptomatic hyponatremia - hold HCTZ for now  - improved  #hypokalemia - awaiting morning labs    # chronic afib - sp multiple DCCV failed, sp cryoablation 1 week ago - back in afib now - alerted EPS team at [place] for follow up post dc  cont xarelto , sotolol rate controlled.    #hx of temproal arteritis - resume home meds after dc    #possible urinary retention due to fecal impaction - did not get straight cath last night - was able to void after some time, dicsussed with RN to monitor bladder scan.  # fecal impaction with stercoral colitis - symptomatic treatement - was unable to disimpact in ER  - senna, mirilax, tap water enema, lactulose x1   -refused enema for now - will consider it again  -passing flatus  - calcium normal   - GI eval if endoscopic retrieval needed if failure of medications    #mild asymptomatic hyponatremia - hold HCTZ for now  - improved  #hypokalemia - resolved - mg wnl as well    # chronic afib - sp multiple DCCV failed, sp cryoablation 1 week ago - back in afib now - alerted EPS team at [place] for follow up post dc  cont xarelto , sotolol rate controlled.    #hx of temproal arteritis - resume home meds after dc    #possible urinary retention due to fecal impaction - did not get straight cath last night - was able to void after some time, dicsussed with RN to monitor bladder scan.   voided 400cc - with pvr 300 - asked pt/rn for double void and monitor bladder scan - may need straight cath

## 2024-01-18 NOTE — PROGRESS NOTE ADULT - SUBJECTIVE AND OBJECTIVE BOX
GRACIELA GUEVARA  83y, Female  Allergy: penicillin V potassium (Unknown)  naproxen (Unknown)  Cipro (Sedation/Somnol)  amoxicillin (Other (Mild to Mod))  sulfonamides (Unknown)  cephalexin (Unknown)      CHIEF COMPLAINT:     HPI:  Patient is an 82yo F w. pmhx of AFib on Xarelto s/p ablation one week ago, pHTN, Temporal arteritis, Osteoporosis presenting with complaints of constipation since last week Tuesday 01/09. Reports had ablation w/ CT attending Dr. Lester on week ago, since than has been extremely constipated with very little appetite. Reports taking a suppository on Sunday with very small amount of watery brown stool. Endorses generalized cramping lower abdominal pain. Denies fever, chills, nausea, vomiting, chest pain, palpitation, sob, or any urinary sxs     (17 Jan 2024 15:12)    HPI:    FAMILY HISTORY:    PAST MEDICAL & SURGICAL HISTORY:  Atrial fibrillation      HTN (hypertension)      History of temporal arteritis      History of temporal artery biopsy      Osteoporosis      H/O temporal arteritis      S/P cataract surgery          SOCIAL HISTORY  Social History:  Patient former smoker, quit 30 years ago.  Denies drinking or any illicit drug use  Mostly independent (17 Jan 2024 15:12)      Home Medications:  Actemra 162 mg/0.9 mL subcutaneous solution: subcutaneous every 3 weeks (10 Lucien 2024 07:38)  hydroCHLOROthiazide 12.5 mg oral tablet: 1 tablet orally once a day (11 Jan 2024 11:18)  hydrocortisone 2.5% topical cream: Apply topically to affected area as needed for  itching (10 Lucien 2024 07:38)  olmesartan 20 mg oral tablet: 1 orally (10 Lucien 2024 07:38)  Prolia 60 mg/mL subcutaneous solution: subcutaneous every 6 months (10 Lucien 2024 07:38)  sotalol 80 mg oral tablet: 1 tab(s) orally 2 times a day (10 Lucien 2024 07:38)  Xarelto 20 mg oral tablet: 1 tab(s) orally once a day (in the evening) (11 Jan 2024 11:20)      ROS  General: Denies fevers, chills, nightsweats, weight loss  HEENT: Denies headache, rhinorrhea, sore throat, eye pain  CV: Denies CP, palpitations  PULM: Denies SOB, cough  GI: Denies abdominal pain, diarrhea  : Denies dysuria, hematuria  MSK: Denies arthralgias  SKIN: Denies rash   NEURO: Denies paresthesias, weakness  PSYCH: Denies depression    VITALS:  T(F): 95.9, Max: 98 (01-17-24 @ 10:28)  HR: 101  BP: 129/79  RR: 18Vital Signs Last 24 Hrs  T(C): 35.5 (18 Jan 2024 05:14), Max: 36.7 (17 Jan 2024 10:28)  T(F): 95.9 (18 Jan 2024 05:14), Max: 98 (17 Jan 2024 10:28)  HR: 101 (18 Jan 2024 05:14) (91 - 105)  BP: 129/79 (18 Jan 2024 05:14) (119/59 - 133/70)  BP(mean): --  RR: 18 (18 Jan 2024 05:14) (18 - 18)  SpO2: 99% (18 Jan 2024 05:14) (96% - 99%)    Parameters below as of 18 Jan 2024 05:14  Patient On (Oxygen Delivery Method): room air        PHYSICAL EXAM:  Gen: NAD, resting in bed  HEENT: Normocephalic, atraumatic  Neck: supple, no lymphadenopathy  CV: Regular rate & regular rhythm  Lungs: CTABL no wheeze  Abdomen: Soft, NT mild distention+ BS present  Ext: Warm, well perfused no CCE, left groin ecchymosis (sp ablation one week ago)  Neuro: non focal, awake, CN II-XII intact   Skin: no rash, no erythema  Psych: no SI, HI, Hallucination     TESTS & MEASUREMENTS:                        11.9   5.08  )-----------( 207      ( 17 Jan 2024 10:45 )             34.3     01-17    131<L>  |  91<L>  |  15  ----------------------------<  109<H>  3.4<L>   |  28  |  1.3    Ca    9.2      17 Jan 2024 19:04    TPro  6.1  /  Alb  4.0  /  TBili  0.4  /  DBili  x   /  AST  20  /  ALT  20  /  AlkPhos  40  01-17      LIVER FUNCTIONS - ( 17 Jan 2024 10:45 )  Alb: 4.0 g/dL / Pro: 6.1 g/dL / ALK PHOS: 40 U/L / ALT: 20 U/L / AST: 20 U/L / GGT: x           Urinalysis Basic - ( 17 Jan 2024 19:04 )    Color: x / Appearance: x / SG: x / pH: x  Gluc: 109 mg/dL / Ketone: x  / Bili: x / Urobili: x   Blood: x / Protein: x / Nitrite: x   Leuk Esterase: x / RBC: x / WBC x   Sq Epi: x / Non Sq Epi: x / Bacteria: x          Lactate, Blood: 1.1 mmol/L (01-17-24 @ 10:45)    QRS axis to [] ° and NSR at a rate of [] BPM. There was no atrial enlargement. There was no ventricular hypertrophy. There were no ST-T changes and all intervals were normal.      INFECTIOUS DISEASES TESTING      RADIOLOGY & ADDITIONAL TESTS:  I have personally reviewed the last Chest xray  CXR      CT  CT Abdomen and Pelvis w/ IV Cont:   ACC: [id number] EXAM:  CT ABDOMEN AND PELVIS IC   ORDERED BY: ISABEL CLEVELAND     PROCEDURE DATE:  01/17/2024          INTERPRETATION:  CLINICAL STATEMENT: Constipation    TECHNIQUE: Contiguous axial CT images were obtained from the lower chest   to the pubic symphysis following administration of intravenous contrast.    90 cc administered of Omnipaque 350 (10 cc discarded).  Oral contrast was   not administered.  Reformatted images in the coronal and sagittal planes   were acquired.    COMPARISON CT: None.    OTHER STUDIES USED FOR CORRELATION: None.      FINDINGS:    LOWER CHEST: Right middle lobe tree-in-bud and nodular opacities with   areas of bronchiectasis. Additional scattered nodular opacities noted in   the left lower lobe.    HEPATOBILIARY: Unremarkable.    SPLEEN: Unremarkable.    PANCREAS: Unremarkable.    ADRENAL GLANDS: Unremarkable.    KIDNEYS: Symmetric renal enhancement bilaterally. No hydronephrosis.    ABDOMINOPELVIC NODES: No lymphadenopathy.    PELVIC ORGANS: Unremarkable.    PERITONEUM/MESENTERY/BOWEL: Large diffuse colonic stool burden. Stool   filled distended rectum measures 7.5 cm in diameter with mild hazy   adjacent edema/fat stranding. No bowel obstruction, ascites or   pneumoperitoneum. Normal appendix.    BONES/SOFT TISSUES: Bilateral L5 pars defects with grade 2   anterolisthesis of L5 on S1.    OTHER: Scattered atherosclerotic vascular calcifications.      IMPRESSION:  1.  Stool filled distended rectum measuring 7.5 cm in diameter with mild   surrounding inflammatory changes, suspicious for developing stercoral   proctitis. Large diffuse colonic stool burden.  2.  Evidence of bibasilar infectious/inflammatory small airways disease.    --- End of Report ---            LALA RAMIREZ MD; Attending Radiologist  This document has been electronically signed. Jan 17 2024  1:36PM (01-17-24 @ 12:30)      CARDIOLOGY TESTING  12 Lead ECG:   Ventricular Rate 96 BPM    Atrial Rate 300 BPM    QRS Duration 88 ms    Q-T Interval 382 ms    QTC Calculation(Bazett) 482 ms    R Axis 59 degrees    T Axis -6 degrees    Diagnosis Line Atrial fibrillation  Nonspecific ST-T changes  Confirmed by CARMEN BUNCH MD ([id number]) on 1/17/2024 11:52:19 AM (01-17-24 @ 10:55)  12 Lead ECG:   Ventricular Rate 74 BPM    Atrial Rate 74 BPM    P-R Interval 150 ms    QRS Duration 76 ms    Q-T Interval 420 ms    QTC Calculation(Bazett) 466 ms    P Axis 89 degrees    R Axis 64 degrees    T Axis 83 degrees    Diagnosis Line Normal sinus rhythm  Nonspecific T wave abnormality  Abnormal ECG    Confirmed by DEAN OCASIO MD ([id number]) on 1/11/2024 6:53:24 AM (01-11-24 @ 05:34)      MEDICATIONS  (STANDING):  influenza  Vaccine (HIGH DOSE) 0.7 milliLiter(s) IntraMuscular once  lactulose Syrup 20 Gram(s) Oral once  lactulose Syrup 15 Gram(s) Oral once  losartan 50 milliGRAM(s) Oral daily  pantoprazole    Tablet 40 milliGRAM(s) Oral before breakfast  polyethylene glycol 3350 17 Gram(s) Oral daily  rivaroxaban 20 milliGRAM(s) Oral with dinner  senna 2 Tablet(s) Oral at bedtime  sodium chloride 0.9%. 1000 milliLiter(s) (150 mL/Hr) IV Continuous <Continuous>  sotalol. 80 milliGRAM(s) Oral every 12 hours    MEDICATIONS  (PRN):  acetaminophen     Tablet .. 650 milliGRAM(s) Oral every 6 hours PRN Temp greater or equal to 38C (100.4F), Mild Pain (1 - 3)  melatonin 3 milliGRAM(s) Oral at bedtime PRN Insomnia      ANTIBIOTICS:      All available historical data has been reviewed    ASSESSMENT  83y F admitted with Indeterminate colitis    Atrial fibrillation        PROBLEMS

## 2024-01-18 NOTE — CONSULT NOTE ADULT - SUBJECTIVE AND OBJECTIVE BOX
Chief complaint/Reason for consult: constipation    HPI:  Patient is an 84yo F w. pmhx of AFib on Xarelto s/p ablation one week ago, pHTN, Temporal arteritis, Osteoporosis presenting with complaints of constipation since last week Tuesday 01/09. Reports had ablation w/ CT attending Dr. Lester on week ago, since than has been extremely constipated with very little appetite. Reports taking a suppository on Sunday with very small amount of watery brown stool. Endorses generalized cramping lower abdominal pain. Denies fever, chills, nausea, vomiting, chest pain, palpitation, sob, or any urinary sxs     (17 Jan 2024 15:12)    GI Updates: 83yFemale pmh A-fib on Xarelto s/p ablation one week ago patient reports she was laying in bed a lot since. Patient reports she has had no bm for 9 days but passing gas and reports this has never happened before. Patient denies nausea, vomiting, hematemesis, melena, blood in stool, diarrhea, abdominal pain. +constipation, no weight loss or appetite changes.       PAST MEDICAL & SURGICAL HISTORY:   Atrial fibrillation      HTN (hypertension)      History of temporal arteritis      History of temporal artery biopsy      Osteoporosis      H/O temporal arteritis      S/P cataract surgery            Family history:  FAMILY HISTORY:    No GI cancers in first or second degree relatives    Social History: No smoking. No alcohol. No illegal drug use.    Allergies:   penicillin V potassium (Unknown)  naproxen (Unknown)  Cipro (Sedation/Somnol)  amoxicillin (Other (Mild to Mod))  sulfonamides (Unknown)  cephalexin (Unknown)  Intolerances      MEDICATIONS: Home Medications:  Actemra 162 mg/0.9 mL subcutaneous solution: subcutaneous every 3 weeks (10 Lucien 2024 07:38)  hydroCHLOROthiazide 12.5 mg oral tablet: 1 tablet orally once a day (11 Jan 2024 11:18)  hydrocortisone 2.5% topical cream: Apply topically to affected area as needed for  itching (10 Lucien 2024 07:38)  olmesartan 20 mg oral tablet: 1 orally (10 Lucien 2024 07:38)  Prolia 60 mg/mL subcutaneous solution: subcutaneous every 6 months (10 Lucien 2024 07:38)  sotalol 80 mg oral tablet: 1 tab(s) orally 2 times a day (10 Lucien 2024 07:38)  Xarelto 20 mg oral tablet: 1 tab(s) orally once a day (in the evening) (11 Jan 2024 11:20)    MEDICATIONS  (STANDING):  influenza  Vaccine (HIGH DOSE) 0.7 milliLiter(s) IntraMuscular once  lactulose Syrup 15 Gram(s) Oral once  losartan 50 milliGRAM(s) Oral daily  pantoprazole    Tablet 40 milliGRAM(s) Oral before breakfast  polyethylene glycol 3350 17 Gram(s) Oral daily  rivaroxaban 20 milliGRAM(s) Oral with dinner  senna 2 Tablet(s) Oral at bedtime  sodium chloride 0.9%. 1000 milliLiter(s) (150 mL/Hr) IV Continuous <Continuous>  sotalol. 80 milliGRAM(s) Oral every 12 hours    MEDICATIONS  (PRN):  acetaminophen     Tablet .. 650 milliGRAM(s) Oral every 6 hours PRN Temp greater or equal to 38C (100.4F), Mild Pain (1 - 3)  melatonin 3 milliGRAM(s) Oral at bedtime PRN Insomnia        REVIEW OF SYSTEMS  General:  No weight loss, fevers, or chills.  Eyes:  No reported pain or visual changes  ENT:  No sore throat or runny nose.  NECK: No stiffness or lymphadenopathy  CV:  No chest pain or palpitations.  Resp:  No shortness of breath, cough, wheezing or hemoptysis  GI:  No abdominal pain, nausea, vomiting, dysphagia, diarrhea + constipation. No rectal bleeding, melena, or hematemesis.  Muscle:  No aches or weakness  Neuro:  No tingling, numbness       VITALS:   T(F): 95.9 (01-18-24 @ 05:14), Max: 97.5 (01-17-24 @ 12:40)  HR: 101 (01-18-24 @ 05:14) (91 - 105)  BP: 129/79 (01-18-24 @ 05:14) (119/59 - 133/70)  RR: 18 (01-18-24 @ 05:14) (18 - 18)  SpO2: 99% (01-18-24 @ 05:14) (96% - 99%)    PHYSICAL EXAM:  GENERAL: AAOx3, no acute distress.  HEAD:  Atraumatic, Normocephalic  EYES: conjunctiva and sclera clear  NECK: Supple, No thyromegaly   CHEST/LUNG: Clear to auscultation bilaterally; No wheeze, rhonchi, or rales  HEART: Regular rate and rhythm; normal S1, S2, No murmurs.  ABDOMEN: Soft, nontender, nondistended; Bowel sounds present  NEUROLOGY: No asterixis or tremor  SKIN: Intact, no jaundice  Rectal exam-stool burden appreciated in rectal vault        LABS:  01-18    134<L>  |  98  |  14  ----------------------------<  104<H>  4.2   |  28  |  0.6<L>    Ca    8.3<L>      18 Jan 2024 04:30  Phos  3.3     01-18  Mg     2.0     01-18    TPro  6.1  /  Alb  4.0  /  TBili  0.4  /  DBili  x   /  AST  20  /  ALT  20  /  AlkPhos  40  01-17                          11.1   3.05  )-----------( 188      ( 18 Jan 2024 04:30 )             32.1     LIVER FUNCTIONS - ( 17 Jan 2024 10:45 )  Alb: 4.0 g/dL / Pro: 6.1 g/dL / ALK PHOS: 40 U/L / ALT: 20 U/L / AST: 20 U/L / GGT: x               IMAGING:    < from: CT Abdomen and Pelvis w/ IV Cont (01.17.24 @ 12:30) >    ACC: [id number] EXAM:  CT ABDOMEN AND PELVIS IC   ORDERED BY: ISABEL CLEVELAND     PROCEDURE DATE:  01/17/2024          INTERPRETATION:  CLINICAL STATEMENT: Constipation    TECHNIQUE: Contiguous axial CT images were obtained from the lower chest   to the pubic symphysis following administration of intravenous contrast.    90 cc administered of Omnipaque 350 (10 cc discarded).  Oral contrast was   not administered.  Reformatted images in the coronal and sagittal planes   were acquired.    COMPARISON CT: None.    OTHER STUDIES USED FOR CORRELATION: None.      FINDINGS:    LOWER CHEST: Right middle lobe tree-in-bud and nodular opacities with   areas of bronchiectasis. Additional scattered nodular opacities noted in   the left lower lobe.    HEPATOBILIARY: Unremarkable.    SPLEEN: Unremarkable.    PANCREAS: Unremarkable.    ADRENAL GLANDS: Unremarkable.    KIDNEYS: Symmetric renal enhancement bilaterally. No hydronephrosis.    ABDOMINOPELVIC NODES: No lymphadenopathy.    PELVIC ORGANS: Unremarkable.    PERITONEUM/MESENTERY/BOWEL: Large diffuse colonic stool burden. Stool   filled distended rectum measures 7.5 cm in diameter with mild hazy   adjacent edema/fat stranding. No bowel obstruction, ascites or   pneumoperitoneum. Normal appendix.    BONES/SOFT TISSUES: Bilateral L5 pars defects with grade 2   anterolisthesis of L5 on S1.    OTHER: Scattered atherosclerotic vascular calcifications.      IMPRESSION:  1.  Stool filled distended rectum measuring 7.5 cm in diameter with mild   surrounding inflammatory changes, suspicious for developing stercoral   proctitis. Large diffuse colonic stool burden.  2.  Evidence of bibasilar infectious/inflammatory small airways disease.    --- End of Report ---            LALA RAMIREZ MD; Attending Radiologist  This document has been electronically signed. Jan 17 2024  1:36PM    < end of copied text >

## 2024-01-18 NOTE — CONSULT NOTE ADULT - ASSESSMENT
83yFemale pmh A-fib on Xarelto s/p ablation one week ago patient reports she was laying in bed a lot since. Patient reports she has had no bm for 9 days but passing gas and reports this has never happened before.      # Stool filled distended rectum measuring 7.5 cm in diameter with mild   surrounding inflammatory changes, suspicious for developing stercoral   proctitis. Large diffuse colonic stool burden.  patient reports last colonoscopy 8 years ago with Dr. Hunt wnl  Rec  -performed manual disimpaction mild stool burden removed   -lavaged colon with 200ccs of sterile water   -tap water enemas q4h when patient has spontaneous bms would start oral bowel regimen  -consider outpatient Colonoscopy with patient's private GI Dr. Hunt if benefits outweigh risks     #  Evidence of bibasilar infectious/inflammatory small airways disease  Rec  - Care as per primary team  83yFemale pmh A-fib on Xarelto s/p ablation one week ago patient reports she was laying in bed a lot since. Patient reports she has had no bm for 9 days but passing gas and reports this has never happened before.    # Stool filled distended rectum measuring 7.5 cm in diameter with mild   surrounding inflammatory changes, suspicious for developing stercoral   proctitis. Large diffuse colonic stool burden.  patient reports last colonoscopy 8 years ago with Dr. Hunt wnl  Rec  -performed manual disimpaction mild stool burden removed   -lavaged colon with 200ccs of sterile water   -tap water enemas q4h when patient has spontaneous bms would start oral bowel regimen  -consider outpatient Colonoscopy with patient's private GI Dr. Hunt if benefits outweigh risks     #  Evidence of bibasilar infectious/inflammatory small airways disease  Rec  - Care as per primary team

## 2024-01-18 NOTE — CHART NOTE - NSCHARTNOTEFT_GEN_A_CORE
Called for urinary retention >400 on sono  Plan insert murray cath for urinary retention and consider d/c murray in am for trial of void during the morning

## 2024-01-19 LAB
CULTURE RESULTS: SIGNIFICANT CHANGE UP
SPECIMEN SOURCE: SIGNIFICANT CHANGE UP

## 2024-01-19 PROCEDURE — 99233 SBSQ HOSP IP/OBS HIGH 50: CPT

## 2024-01-19 PROCEDURE — 74018 RADEX ABDOMEN 1 VIEW: CPT | Mod: 26

## 2024-01-19 RX ADMIN — LOSARTAN POTASSIUM 50 MILLIGRAM(S): 100 TABLET, FILM COATED ORAL at 05:11

## 2024-01-19 RX ADMIN — SODIUM CHLORIDE 150 MILLILITER(S): 9 INJECTION INTRAMUSCULAR; INTRAVENOUS; SUBCUTANEOUS at 23:48

## 2024-01-19 RX ADMIN — Medication 1 APPLICATION(S): at 13:13

## 2024-01-19 RX ADMIN — PANTOPRAZOLE SODIUM 40 MILLIGRAM(S): 20 TABLET, DELAYED RELEASE ORAL at 05:11

## 2024-01-19 RX ADMIN — SENNA PLUS 2 TABLET(S): 8.6 TABLET ORAL at 21:10

## 2024-01-19 RX ADMIN — Medication 1 APPLICATION(S): at 17:37

## 2024-01-19 RX ADMIN — Medication 1 APPLICATION(S): at 05:11

## 2024-01-19 RX ADMIN — POLYETHYLENE GLYCOL 3350 17 GRAM(S): 17 POWDER, FOR SOLUTION ORAL at 13:13

## 2024-01-19 RX ADMIN — RIVAROXABAN 20 MILLIGRAM(S): KIT at 17:37

## 2024-01-19 RX ADMIN — Medication 80 MILLIGRAM(S): at 17:37

## 2024-01-19 RX ADMIN — Medication 80 MILLIGRAM(S): at 05:11

## 2024-01-19 NOTE — PROGRESS NOTE ADULT - ASSESSMENT
83yFemale pmh A-fib on Xarelto s/p ablation one week ago patient reports she was laying in bed a lot since. Patient reports she has had no bm for 9 days but passing gas and reports this has never happened before.      # Stool filled distended rectum measuring 7.5 cm in diameter with mild   surrounding inflammatory changes, suspicious for developing stercoral   proctitis. Large diffuse colonic stool burden.  patient reports last colonoscopy 8 years ago with Dr. Hunt wn  Rec  -performed manual disimpaction mild stool burden removed 1/18/24  -lavaged colon with 200ccs of sterile water 1/18/24  -tap water enemas q4h when patient has spontaneous bms would start oral bowel regimen, only one enema was given yesterday after I gave enemas, please hold oral bowel regimen until patient has spontaneous bms  -consider outpatient Colonoscopy with patient's private GI Dr. Hunt if benefits outweigh risks     #  Evidence of bibasilar infectious/inflammatory small airways disease  Rec  - Care as per primary team      83yFemale pmh A-fib on Xarelto s/p ablation one week ago patient reports she was laying in bed a lot since. Patient reports she has had no bm for 9 days but passing gas and reports this has never happened before.      # Stool filled distended rectum measuring 7.5 cm in diameter with mild   surrounding inflammatory changes, suspicious for developing stercoral   proctitis. Large diffuse colonic stool burden.  patient reports last colonoscopy 8 years ago with Dr. Hunt wn  Rec  -performed manual disimpaction mild stool burden removed 1/18/24  -lavaged colon with 200ccs of sterile water 1/18/24  -f/u KUB  -tap water enemas q4h when patient has spontaneous bms would start oral bowel regimen, only one enema was given yesterday after I gave enemas, please hold oral bowel regimen until patient has spontaneous bms  -consider outpatient Colonoscopy with patient's private GI Dr. Hunt if benefits outweigh risks     #  Evidence of bibasilar infectious/inflammatory small airways disease  Rec  - Care as per primary team

## 2024-01-19 NOTE — PROGRESS NOTE ADULT - SUBJECTIVE AND OBJECTIVE BOX
83yFemale  Being followed for constipation  Interval history: Patient denies nausea, vomiting, hematemesis, melena, blood in stool, diarrhea, +constipation, no abdominal pain. Patient had one bm today.       PAST MEDICAL & SURGICAL HISTORY:   Atrial fibrillation      HTN (hypertension)      History of temporal arteritis      History of temporal artery biopsy      Osteoporosis      H/O temporal arteritis      S/P cataract surgery                Social History: No smoking. No alcohol. No illegal drug use.            MEDICATIONS  (STANDING):  influenza  Vaccine (HIGH DOSE) 0.7 milliLiter(s) IntraMuscular once  lactulose Syrup 15 Gram(s) Oral once  losartan 50 milliGRAM(s) Oral daily  pantoprazole    Tablet 40 milliGRAM(s) Oral before breakfast  polyethylene glycol 3350 17 Gram(s) Oral daily  rivaroxaban 20 milliGRAM(s) Oral with dinner  senna 2 Tablet(s) Oral at bedtime  sodium chloride 0.9%. 1000 milliLiter(s) (150 mL/Hr) IV Continuous <Continuous>  sotalol. 80 milliGRAM(s) Oral every 12 hours  vitamin A &amp; D Ointment 1 Application(s) Topical every 6 hours    MEDICATIONS  (PRN):  acetaminophen     Tablet .. 650 milliGRAM(s) Oral every 6 hours PRN Temp greater or equal to 38C (100.4F), Mild Pain (1 - 3)  melatonin 3 milliGRAM(s) Oral at bedtime PRN Insomnia      Allergies:   penicillin V potassium (Unknown)  naproxen (Unknown)  Cipro (Sedation/Somnol)  amoxicillin (Other (Mild to Mod))  sulfonamides (Unknown)  cephalexin (Unknown)  Intolerances          REVIEW OF SYSTEMS:  General:  No weight loss, fevers, or chills.  Eyes:  No reported pain or visual changes  ENT:  No sore throat or runny nose.  NECK: No stiffness   CV:  No chest pain or palpitations.  Resp:  No shortness of breath, cough  GI:  No abdominal pain, nausea, vomiting, dysphagia, diarrhea + constipation. No rectal bleeding, melena, or hematemesis.  Muscle:  No aches or weakness  Neuro:  No tingling, numbness         VITAL SIGNS:   T(F): 96.2 (01-19-24 @ 04:53), Max: 96.9 (01-18-24 @ 14:23)  HR: 96 (01-19-24 @ 04:53) (83 - 119)  BP: 132/77 (01-19-24 @ 04:53) (132/77 - 162/82)  RR: 18 (01-18-24 @ 20:36) (18 - 18)  SpO2: 95% (01-19-24 @ 04:53) (95% - 98%)    PHYSICAL EXAM:  GENERAL: AAOx3, no acute distress.  HEAD:  Atraumatic, Normocephalic  EYES: conjunctiva and sclera clear  NECK: Supple, no JVD or thyromegaly  CHEST/LUNG: Clear to auscultation bilaterally; No wheeze, rhonchi, or rales  HEART: Regular rate and rhythm; normal S1, S2, No murmurs.  ABDOMEN: Soft, nontender, nondistended; Bowel sounds present  NEUROLOGY: No asterixis or tremor.   SKIN: Intact, no jaundice            LABS:                        11.1   3.05  )-----------( 188      ( 18 Jan 2024 04:30 )             32.1     01-18    134<L>  |  98  |  14  ----------------------------<  104<H>  4.2   |  28  |  0.6<L>    Ca    8.3<L>      18 Jan 2024 04:30  Phos  3.3     01-18  Mg     2.0     01-18            IMAGING:    < from: CT Abdomen and Pelvis w/ IV Cont (01.17.24 @ 12:30) >    ACC: [id number] EXAM:  CT ABDOMEN AND PELVIS IC   ORDERED BY: ISABEL CLEVELAND     PROCEDURE DATE:  01/17/2024          INTERPRETATION:  CLINICAL STATEMENT: Constipation    TECHNIQUE: Contiguous axial CT images were obtained from the lower chest   to the pubic symphysis following administration of intravenous contrast.    90 cc administered of Omnipaque 350 (10 cc discarded).  Oral contrast was   not administered.  Reformatted images in the coronal and sagittal planes   were acquired.    COMPARISON CT: None.    OTHER STUDIES USED FOR CORRELATION: None.      FINDINGS:    LOWER CHEST: Right middle lobe tree-in-bud and nodular opacities with   areas of bronchiectasis. Additional scattered nodular opacities noted in   the left lower lobe.    HEPATOBILIARY: Unremarkable.    SPLEEN: Unremarkable.    PANCREAS: Unremarkable.    ADRENAL GLANDS: Unremarkable.    KIDNEYS: Symmetric renal enhancement bilaterally. No hydronephrosis.    ABDOMINOPELVIC NODES: No lymphadenopathy.    PELVIC ORGANS: Unremarkable.    PERITONEUM/MESENTERY/BOWEL: Large diffuse colonic stool burden. Stool   filled distended rectum measures 7.5 cm in diameter with mild hazy   adjacent edema/fat stranding. No bowel obstruction, ascites or   pneumoperitoneum. Normal appendix.    BONES/SOFT TISSUES: Bilateral L5 pars defects with grade 2   anterolisthesis of L5 on S1.    OTHER: Scattered atherosclerotic vascular calcifications.      IMPRESSION:  1.  Stool filled distended rectum measuring 7.5 cm in diameter with mild   surrounding inflammatory changes, suspicious for developing stercoral   proctitis. Large diffuse colonic stool burden.  2.  Evidence of bibasilar infectious/inflammatory small airways disease.    --- End of Report ---            LALA RAMIREZ MD; Attending Radiologist  This document has been electronically signed. Jan 17 2024  1:36PM    < end of copied text >             83yFemale  Being followed for constipation  Interval history: Patient denies nausea, vomiting, hematemesis, melena, blood in stool, diarrhea, +constipation, no abdominal pain. Patient had one bm today.       PAST MEDICAL & SURGICAL HISTORY:   Atrial fibrillation      HTN (hypertension)      History of temporal arteritis      History of temporal artery biopsy      Osteoporosis      H/O temporal arteritis      S/P cataract surgery                Social History: No smoking. No alcohol. No illegal drug use.            MEDICATIONS  (STANDING):  influenza  Vaccine (HIGH DOSE) 0.7 milliLiter(s) IntraMuscular once  lactulose Syrup 15 Gram(s) Oral once  losartan 50 milliGRAM(s) Oral daily  pantoprazole    Tablet 40 milliGRAM(s) Oral before breakfast  polyethylene glycol 3350 17 Gram(s) Oral daily  rivaroxaban 20 milliGRAM(s) Oral with dinner  senna 2 Tablet(s) Oral at bedtime  sodium chloride 0.9%. 1000 milliLiter(s) (150 mL/Hr) IV Continuous <Continuous>  sotalol. 80 milliGRAM(s) Oral every 12 hours  vitamin A &amp; D Ointment 1 Application(s) Topical every 6 hours    MEDICATIONS  (PRN):  acetaminophen     Tablet .. 650 milliGRAM(s) Oral every 6 hours PRN Temp greater or equal to 38C (100.4F), Mild Pain (1 - 3)  melatonin 3 milliGRAM(s) Oral at bedtime PRN Insomnia      Allergies:   penicillin V potassium (Unknown)  naproxen (Unknown)  Cipro (Sedation/Somnol)  amoxicillin (Other (Mild to Mod))  sulfonamides (Unknown)  cephalexin (Unknown)  Intolerances          REVIEW OF SYSTEMS:  General:  No weight loss, fevers, or chills.  Eyes:  No reported pain or visual changes  ENT:  No sore throat or runny nose.  NECK: No stiffness   CV:  No chest pain or palpitations.  Resp:  No shortness of breath, cough  GI:  No abdominal pain, nausea, vomiting, dysphagia, diarrhea + constipation. No rectal bleeding, melena, or hematemesis.  Muscle:  No aches or weakness  Neuro:  No tingling, numbness         VITAL SIGNS:   T(F): 96.2 (01-19-24 @ 04:53), Max: 96.9 (01-18-24 @ 14:23)  HR: 96 (01-19-24 @ 04:53) (83 - 119)  BP: 132/77 (01-19-24 @ 04:53) (132/77 - 162/82)  RR: 18 (01-18-24 @ 20:36) (18 - 18)  SpO2: 95% (01-19-24 @ 04:53) (95% - 98%)    PHYSICAL EXAM:  GENERAL: AAOx3, no acute distress.  HEAD:  Atraumatic, Normocephalic  EYES: conjunctiva and sclera clear  NECK: Supple, no JVD or thyromegaly  CHEST/LUNG: Clear to auscultation bilaterally; No wheeze, rhonchi, or rales  HEART: Regular rate and rhythm; normal S1, S2, No murmurs.  ABDOMEN: Soft, nontender, +mildly distended; Bowel sounds present  NEUROLOGY: No asterixis or tremor.   SKIN: Intact, no jaundice            LABS:                        11.1   3.05  )-----------( 188      ( 18 Jan 2024 04:30 )             32.1     01-18    134<L>  |  98  |  14  ----------------------------<  104<H>  4.2   |  28  |  0.6<L>    Ca    8.3<L>      18 Jan 2024 04:30  Phos  3.3     01-18  Mg     2.0     01-18            IMAGING:    < from: CT Abdomen and Pelvis w/ IV Cont (01.17.24 @ 12:30) >    ACC: [id number] EXAM:  CT ABDOMEN AND PELVIS IC   ORDERED BY: ISABEL CLEVELAND     PROCEDURE DATE:  01/17/2024          INTERPRETATION:  CLINICAL STATEMENT: Constipation    TECHNIQUE: Contiguous axial CT images were obtained from the lower chest   to the pubic symphysis following administration of intravenous contrast.    90 cc administered of Omnipaque 350 (10 cc discarded).  Oral contrast was   not administered.  Reformatted images in the coronal and sagittal planes   were acquired.    COMPARISON CT: None.    OTHER STUDIES USED FOR CORRELATION: None.      FINDINGS:    LOWER CHEST: Right middle lobe tree-in-bud and nodular opacities with   areas of bronchiectasis. Additional scattered nodular opacities noted in   the left lower lobe.    HEPATOBILIARY: Unremarkable.    SPLEEN: Unremarkable.    PANCREAS: Unremarkable.    ADRENAL GLANDS: Unremarkable.    KIDNEYS: Symmetric renal enhancement bilaterally. No hydronephrosis.    ABDOMINOPELVIC NODES: No lymphadenopathy.    PELVIC ORGANS: Unremarkable.    PERITONEUM/MESENTERY/BOWEL: Large diffuse colonic stool burden. Stool   filled distended rectum measures 7.5 cm in diameter with mild hazy   adjacent edema/fat stranding. No bowel obstruction, ascites or   pneumoperitoneum. Normal appendix.    BONES/SOFT TISSUES: Bilateral L5 pars defects with grade 2   anterolisthesis of L5 on S1.    OTHER: Scattered atherosclerotic vascular calcifications.      IMPRESSION:  1.  Stool filled distended rectum measuring 7.5 cm in diameter with mild   surrounding inflammatory changes, suspicious for developing stercoral   proctitis. Large diffuse colonic stool burden.  2.  Evidence of bibasilar infectious/inflammatory small airways disease.    --- End of Report ---            LALA RAMIREZ MD; Attending Radiologist  This document has been electronically signed. Jan 17 2024  1:36PM    < end of copied text >

## 2024-01-19 NOTE — PROGRESS NOTE ADULT - SUBJECTIVE AND OBJECTIVE BOX
GRACIELA GUEVARA  83y, Female  Allergy: penicillin V potassium (Unknown)  naproxen (Unknown)  Cipro (Sedation/Somnol)  amoxicillin (Other (Mild to Mod))  sulfonamides (Unknown)  cephalexin (Unknown)      CHIEF COMPLAINT:     HPI:  Patient is an 84yo F w. pmhx of AFib on Xarelto s/p ablation one week ago, pHTN, Temporal arteritis, Osteoporosis presenting with complaints of constipation since last week Tuesday 01/09. Reports had ablation w/ CT attending Dr. Lester on week ago, since than has been extremely constipated with very little appetite. Reports taking a suppository on Sunday with very small amount of watery brown stool. Endorses generalized cramping lower abdominal pain. Denies fever, chills, nausea, vomiting, chest pain, palpitation, sob, or any urinary sxs     (17 Jan 2024 15:12)    HPI:    FAMILY HISTORY:    PAST MEDICAL & SURGICAL HISTORY:  Atrial fibrillation      HTN (hypertension)      History of temporal arteritis      History of temporal artery biopsy      Osteoporosis      H/O temporal arteritis      S/P cataract surgery          SOCIAL HISTORY  Social History:  Patient former smoker, quit 30 years ago.  Denies drinking or any illicit drug use  Mostly independent (17 Jan 2024 15:12)      Home Medications:  Actemra 162 mg/0.9 mL subcutaneous solution: subcutaneous every 3 weeks (10 Lucien 2024 07:38)  hydroCHLOROthiazide 12.5 mg oral tablet: 1 tablet orally once a day (11 Jan 2024 11:18)  hydrocortisone 2.5% topical cream: Apply topically to affected area as needed for  itching (10 Lucien 2024 07:38)  olmesartan 20 mg oral tablet: 1 orally (10 Lucien 2024 07:38)  Prolia 60 mg/mL subcutaneous solution: subcutaneous every 6 months (10 Lucien 2024 07:38)  sotalol 80 mg oral tablet: 1 tab(s) orally 2 times a day (10 Lucien 2024 07:38)  Xarelto 20 mg oral tablet: 1 tab(s) orally once a day (in the evening) (11 Jan 2024 11:20)      ROS  General: Denies fevers, chills, nightsweats, weight loss  HEENT: Denies headache, rhinorrhea, sore throat, eye pain  CV: Denies CP, palpitations  PULM: Denies SOB, cough  GI: Denies abdominal pain, diarrhea  : Denies dysuria, hematuria  MSK: Denies arthralgias  SKIN: Denies rash   NEURO: Denies paresthesias, weakness  PSYCH: Denies depression    VITALS:  Patient On (Oxygen Delivery Method): room air        PHYSICAL EXAM:  Gen: NAD, resting in bed  HEENT: Normocephalic, atraumatic  Neck: supple, no lymphadenopathy  CV: Regular rate & regular rhythm  Lungs: CTABL no wheeze  Abdomen: Soft, NT mild distention+ BS present  Ext: Warm, well perfused no CCE, left groin ecchymosis (sp ablation one week ago)  Neuro: non focal, awake, CN II-XII intact   Skin: no rash, no erythema  Psych: no SI, HI, Hallucination     TESTS & MEASUREMENTS:                        11.9   5.08  )-----------( 207      ( 17 Jan 2024 10:45 )             34.3     01-17    131<L>  |  91<L>  |  15  ----------------------------<  109<H>  3.4<L>   |  28  |  1.3    Ca    9.2      17 Jan 2024 19:04    TPro  6.1  /  Alb  4.0  /  TBili  0.4  /  DBili  x   /  AST  20  /  ALT  20  /  AlkPhos  40  01-17      LIVER FUNCTIONS - ( 17 Jan 2024 10:45 )  Alb: 4.0 g/dL / Pro: 6.1 g/dL / ALK PHOS: 40 U/L / ALT: 20 U/L / AST: 20 U/L / GGT: x           Urinalysis Basic - ( 17 Jan 2024 19:04 )    Color: x / Appearance: x / SG: x / pH: x  Gluc: 109 mg/dL / Ketone: x  / Bili: x / Urobili: x   Blood: x / Protein: x / Nitrite: x   Leuk Esterase: x / RBC: x / WBC x   Sq Epi: x / Non Sq Epi: x / Bacteria: x          Lactate, Blood: 1.1 mmol/L (01-17-24 @ 10:45)    QRS axis to [] ° and NSR at a rate of [] BPM. There was no atrial enlargement. There was no ventricular hypertrophy. There were no ST-T changes and all intervals were normal.      INFECTIOUS DISEASES TESTING      RADIOLOGY & ADDITIONAL TESTS:  I have personally reviewed the last Chest xray  CXR      CT  CT Abdomen and Pelvis w/ IV Cont:   ACC: [id number] EXAM:  CT ABDOMEN AND PELVIS IC   ORDERED BY: ISABEL CLEVELAND     PROCEDURE DATE:  01/17/2024          INTERPRETATION:  CLINICAL STATEMENT: Constipation    TECHNIQUE: Contiguous axial CT images were obtained from the lower chest   to the pubic symphysis following administration of intravenous contrast.    90 cc administered of Omnipaque 350 (10 cc discarded).  Oral contrast was   not administered.  Reformatted images in the coronal and sagittal planes   were acquired.    COMPARISON CT: None.    OTHER STUDIES USED FOR CORRELATION: None.      FINDINGS:    LOWER CHEST: Right middle lobe tree-in-bud and nodular opacities with   areas of bronchiectasis. Additional scattered nodular opacities noted in   the left lower lobe.    HEPATOBILIARY: Unremarkable.    SPLEEN: Unremarkable.    PANCREAS: Unremarkable.    ADRENAL GLANDS: Unremarkable.    KIDNEYS: Symmetric renal enhancement bilaterally. No hydronephrosis.    ABDOMINOPELVIC NODES: No lymphadenopathy.    PELVIC ORGANS: Unremarkable.    PERITONEUM/MESENTERY/BOWEL: Large diffuse colonic stool burden. Stool   filled distended rectum measures 7.5 cm in diameter with mild hazy   adjacent edema/fat stranding. No bowel obstruction, ascites or   pneumoperitoneum. Normal appendix.    BONES/SOFT TISSUES: Bilateral L5 pars defects with grade 2   anterolisthesis of L5 on S1.    OTHER: Scattered atherosclerotic vascular calcifications.      IMPRESSION:  1.  Stool filled distended rectum measuring 7.5 cm in diameter with mild   surrounding inflammatory changes, suspicious for developing stercoral   proctitis. Large diffuse colonic stool burden.  2.  Evidence of bibasilar infectious/inflammatory small airways disease.    --- End of Report ---            LALA RAMIREZ MD; Attending Radiologist  This document has been electronically signed. Jan 17 2024  1:36PM (01-17-24 @ 12:30)      CARDIOLOGY TESTING  12 Lead ECG:   Ventricular Rate 96 BPM    Atrial Rate 300 BPM    QRS Duration 88 ms    Q-T Interval 382 ms    QTC Calculation(Bazett) 482 ms    R Axis 59 degrees    T Axis -6 degrees    Diagnosis Line Atrial fibrillation  Nonspecific ST-T changes  Confirmed by CARMEN BUNCH MD ([id number]) on 1/17/2024 11:52:19 AM (01-17-24 @ 10:55)  12 Lead ECG:   Ventricular Rate 74 BPM    Atrial Rate 74 BPM    P-R Interval 150 ms    QRS Duration 76 ms    Q-T Interval 420 ms    QTC Calculation(Bazett) 466 ms    P Axis 89 degrees    R Axis 64 degrees    T Axis 83 degrees    Diagnosis Line Normal sinus rhythm  Nonspecific T wave abnormality  Abnormal ECG    Confirmed by DEAN OCASIO MD ([id number]) on 1/11/2024 6:53:24 AM (01-11-24 @ 05:34)      MEDICATIONS  (STANDING):  influenza  Vaccine (HIGH DOSE) 0.7 milliLiter(s) IntraMuscular once  lactulose Syrup 20 Gram(s) Oral once  lactulose Syrup 15 Gram(s) Oral once  losartan 50 milliGRAM(s) Oral daily  pantoprazole    Tablet 40 milliGRAM(s) Oral before breakfast  polyethylene glycol 3350 17 Gram(s) Oral daily  rivaroxaban 20 milliGRAM(s) Oral with dinner  senna 2 Tablet(s) Oral at bedtime  sodium chloride 0.9%. 1000 milliLiter(s) (150 mL/Hr) IV Continuous <Continuous>  sotalol. 80 milliGRAM(s) Oral every 12 hours    MEDICATIONS  (PRN):  acetaminophen     Tablet .. 650 milliGRAM(s) Oral every 6 hours PRN Temp greater or equal to 38C (100.4F), Mild Pain (1 - 3)  melatonin 3 milliGRAM(s) Oral at bedtime PRN Insomnia      ANTIBIOTICS:      All available historical data has been reviewed    ASSESSMENT  83y F admitted with Indeterminate colitis    Atrial fibrillation        PROBLEMS       GRACIELA GUEVARA  83y, Female  Allergy: penicillin V potassium (Unknown)  naproxen (Unknown)  Cipro (Sedation/Somnol)  amoxicillin (Other (Mild to Mod))  sulfonamides (Unknown)  cephalexin (Unknown)      CHIEF COMPLAINT:     HPI:  Patient is an 84yo F w. pmhx of AFib on Xarelto s/p ablation one week ago, pHTN, Temporal arteritis, Osteoporosis presenting with complaints of constipation since last week Tuesday 01/09. Reports had ablation w/ CT attending Dr. Lester on week ago, since than has been extremely constipated with very little appetite. Reports taking a suppository on Sunday with very small amount of watery brown stool. Endorses generalized cramping lower abdominal pain. Denies fever, chills, nausea, vomiting, chest pain, palpitation, sob, or any urinary sxs     (17 Jan 2024 15:12)    HPI:    FAMILY HISTORY:    PAST MEDICAL & SURGICAL HISTORY:  Atrial fibrillation      HTN (hypertension)      History of temporal arteritis      History of temporal artery biopsy      Osteoporosis      H/O temporal arteritis      S/P cataract surgery          SOCIAL HISTORY  Social History:  Patient former smoker, quit 30 years ago.  Denies drinking or any illicit drug use  Mostly independent (17 Jan 2024 15:12)      Home Medications:  Actemra 162 mg/0.9 mL subcutaneous solution: subcutaneous every 3 weeks (10 Lucien 2024 07:38)  hydroCHLOROthiazide 12.5 mg oral tablet: 1 tablet orally once a day (11 Jan 2024 11:18)  hydrocortisone 2.5% topical cream: Apply topically to affected area as needed for  itching (10 Lucien 2024 07:38)  olmesartan 20 mg oral tablet: 1 orally (10 Lucien 2024 07:38)  Prolia 60 mg/mL subcutaneous solution: subcutaneous every 6 months (10 Lucien 2024 07:38)  sotalol 80 mg oral tablet: 1 tab(s) orally 2 times a day (10 Lucien 2024 07:38)  Xarelto 20 mg oral tablet: 1 tab(s) orally once a day (in the evening) (11 Jan 2024 11:20)      ROS  General: Denies fevers, chills, nightsweats, weight loss  HEENT: Denies headache, rhinorrhea, sore throat, eye pain  CV: Denies CP, palpitations  PULM: Denies SOB, cough  GI: Denies abdominal pain, diarrhea  : Denies dysuria, hematuria  MSK: Denies arthralgias  SKIN: Denies rash   NEURO: Denies paresthesias, weakness  PSYCH: Denies depression    VITALS:  Patient On (Oxygen Delivery Method): room air        PHYSICAL EXAM:  Gen: NAD, resting in bed  HEENT: Normocephalic, atraumatic  Neck: supple, no lymphadenopathy  CV: Regular rate & regular rhythm  Lungs: CTABL no wheeze  Abdomen: Soft, NT mild distention+ BS present  Ext: Warm, well perfused no CCE, left groin ecchymosis (sp ablation one week ago)  Neuro: non focal, awake, CN II-XII intact   Skin: no rash, no erythema  Psych: no SI, HI, Hallucination     TESTS & MEASUREMENTS:  01-18    134<L>  |  98  |  14  ----------------------------<  104<H>  4.2   |  28  |  0.6<L>    Ca    8.3<L>      18 Jan 2024 04:30  Phos  3.3     01-18  Mg     2.0     01-18                            11.9   5.08  )-----------( 207      ( 17 Jan 2024 10:45 )             34.3     01-17    131<L>  |  91<L>  |  15  ----------------------------<  109<H>  3.4<L>   |  28  |  1.3    Ca    9.2      17 Jan 2024 19:04    TPro  6.1  /  Alb  4.0  /  TBili  0.4  /  DBili  x   /  AST  20  /  ALT  20  /  AlkPhos  40  01-17      LIVER FUNCTIONS - ( 17 Jan 2024 10:45 )  Alb: 4.0 g/dL / Pro: 6.1 g/dL / ALK PHOS: 40 U/L / ALT: 20 U/L / AST: 20 U/L / GGT: x           Urinalysis Basic - ( 17 Jan 2024 19:04 )    Color: x / Appearance: x / SG: x / pH: x  Gluc: 109 mg/dL / Ketone: x  / Bili: x / Urobili: x   Blood: x / Protein: x / Nitrite: x   Leuk Esterase: x / RBC: x / WBC x   Sq Epi: x / Non Sq Epi: x / Bacteria: x          Lactate, Blood: 1.1 mmol/L (01-17-24 @ 10:45)    QRS axis to [] ° and NSR at a rate of [] BPM. There was no atrial enlargement. There was no ventricular hypertrophy. There were no ST-T changes and all intervals were normal.      INFECTIOUS DISEASES TESTING      RADIOLOGY & ADDITIONAL TESTS:  I have personally reviewed the last Chest xray  CXR      CT  CT Abdomen and Pelvis w/ IV Cont:   ACC: [id number] EXAM:  CT ABDOMEN AND PELVIS IC   ORDERED BY: ISABEL CLEVELAND     PROCEDURE DATE:  01/17/2024          INTERPRETATION:  CLINICAL STATEMENT: Constipation    TECHNIQUE: Contiguous axial CT images were obtained from the lower chest   to the pubic symphysis following administration of intravenous contrast.    90 cc administered of Omnipaque 350 (10 cc discarded).  Oral contrast was   not administered.  Reformatted images in the coronal and sagittal planes   were acquired.    COMPARISON CT: None.    OTHER STUDIES USED FOR CORRELATION: None.      FINDINGS:    LOWER CHEST: Right middle lobe tree-in-bud and nodular opacities with   areas of bronchiectasis. Additional scattered nodular opacities noted in   the left lower lobe.    HEPATOBILIARY: Unremarkable.    SPLEEN: Unremarkable.    PANCREAS: Unremarkable.    ADRENAL GLANDS: Unremarkable.    KIDNEYS: Symmetric renal enhancement bilaterally. No hydronephrosis.    ABDOMINOPELVIC NODES: No lymphadenopathy.    PELVIC ORGANS: Unremarkable.    PERITONEUM/MESENTERY/BOWEL: Large diffuse colonic stool burden. Stool   filled distended rectum measures 7.5 cm in diameter with mild hazy   adjacent edema/fat stranding. No bowel obstruction, ascites or   pneumoperitoneum. Normal appendix.    BONES/SOFT TISSUES: Bilateral L5 pars defects with grade 2   anterolisthesis of L5 on S1.    OTHER: Scattered atherosclerotic vascular calcifications.      IMPRESSION:  1.  Stool filled distended rectum measuring 7.5 cm in diameter with mild   surrounding inflammatory changes, suspicious for developing stercoral   proctitis. Large diffuse colonic stool burden.  2.  Evidence of bibasilar infectious/inflammatory small airways disease.    --- End of Report ---            LALA RAMIREZ MD; Attending Radiologist  This document has been electronically signed. Jan 17 2024  1:36PM (01-17-24 @ 12:30)      CARDIOLOGY TESTING  12 Lead ECG:   Ventricular Rate 96 BPM    Atrial Rate 300 BPM    QRS Duration 88 ms    Q-T Interval 382 ms    QTC Calculation(Bazett) 482 ms    R Axis 59 degrees    T Axis -6 degrees    Diagnosis Line Atrial fibrillation  Nonspecific ST-T changes  Confirmed by CARMEN BUNCH MD ([id number]) on 1/17/2024 11:52:19 AM (01-17-24 @ 10:55)  12 Lead ECG:   Ventricular Rate 74 BPM    Atrial Rate 74 BPM    P-R Interval 150 ms    QRS Duration 76 ms    Q-T Interval 420 ms    QTC Calculation(Bazett) 466 ms    P Axis 89 degrees    R Axis 64 degrees    T Axis 83 degrees    Diagnosis Line Normal sinus rhythm  Nonspecific T wave abnormality  Abnormal ECG    Confirmed by DEAN OCASIO MD ([id number]) on 1/11/2024 6:53:24 AM (01-11-24 @ 05:34)      MEDICATIONS  (STANDING):  influenza  Vaccine (HIGH DOSE) 0.7 milliLiter(s) IntraMuscular once  lactulose Syrup 20 Gram(s) Oral once  lactulose Syrup 15 Gram(s) Oral once  losartan 50 milliGRAM(s) Oral daily  pantoprazole    Tablet 40 milliGRAM(s) Oral before breakfast  polyethylene glycol 3350 17 Gram(s) Oral daily  rivaroxaban 20 milliGRAM(s) Oral with dinner  senna 2 Tablet(s) Oral at bedtime  sodium chloride 0.9%. 1000 milliLiter(s) (150 mL/Hr) IV Continuous <Continuous>  sotalol. 80 milliGRAM(s) Oral every 12 hours    MEDICATIONS  (PRN):  acetaminophen     Tablet .. 650 milliGRAM(s) Oral every 6 hours PRN Temp greater or equal to 38C (100.4F), Mild Pain (1 - 3)  melatonin 3 milliGRAM(s) Oral at bedtime PRN Insomnia      ANTIBIOTICS:      All available historical data has been reviewed    ASSESSMENT  83y F admitted with Indeterminate colitis    Atrial fibrillation        PROBLEMS

## 2024-01-20 PROCEDURE — 74018 RADEX ABDOMEN 1 VIEW: CPT | Mod: 26

## 2024-01-20 PROCEDURE — 99232 SBSQ HOSP IP/OBS MODERATE 35: CPT

## 2024-01-20 PROCEDURE — 71045 X-RAY EXAM CHEST 1 VIEW: CPT | Mod: 26

## 2024-01-20 RX ORDER — LACTULOSE 10 G/15ML
15 SOLUTION ORAL
Refills: 0 | Status: DISCONTINUED | OUTPATIENT
Start: 2024-01-20 | End: 2024-01-23

## 2024-01-20 RX ADMIN — SODIUM CHLORIDE 150 MILLILITER(S): 9 INJECTION INTRAMUSCULAR; INTRAVENOUS; SUBCUTANEOUS at 06:22

## 2024-01-20 RX ADMIN — LACTULOSE 15 GRAM(S): 10 SOLUTION ORAL at 21:35

## 2024-01-20 RX ADMIN — Medication 80 MILLIGRAM(S): at 17:06

## 2024-01-20 RX ADMIN — Medication 1 APPLICATION(S): at 06:21

## 2024-01-20 RX ADMIN — Medication 80 MILLIGRAM(S): at 06:22

## 2024-01-20 RX ADMIN — LOSARTAN POTASSIUM 50 MILLIGRAM(S): 100 TABLET, FILM COATED ORAL at 06:22

## 2024-01-20 RX ADMIN — RIVAROXABAN 20 MILLIGRAM(S): KIT at 17:06

## 2024-01-20 RX ADMIN — Medication 1 APPLICATION(S): at 13:07

## 2024-01-20 RX ADMIN — SENNA PLUS 2 TABLET(S): 8.6 TABLET ORAL at 21:35

## 2024-01-20 RX ADMIN — PANTOPRAZOLE SODIUM 40 MILLIGRAM(S): 20 TABLET, DELAYED RELEASE ORAL at 06:21

## 2024-01-20 NOTE — CHART NOTE - NSCHARTNOTEFT_GEN_A_CORE
I reviewed KUB reading, states "fecal impaction". Based on GI note I attempted to change TWE frequency to Q4H (not given this option on menu screen so I placed it in additional comments section), however I then spoke to patient's nurse who tells me that patient is refusing all enemas for now but will reconsider in am

## 2024-01-20 NOTE — PROGRESS NOTE ADULT - SUBJECTIVE AND OBJECTIVE BOX
SUBJECTIVE:    Patient is a 83y old Female who presents with a chief complaint of   Currently admitted to medicine with the primary diagnosis of Stercoral colitis     Today is hospital day 3d. This morning she is resting in bed and reports no new issues or overnight events.     PAST MEDICAL & SURGICAL HISTORY  Atrial fibrillation    HTN (hypertension)    History of temporal arteritis    History of temporal artery biopsy    Osteoporosis    H/O temporal arteritis    S/P cataract surgery      SOCIAL HISTORY:  Negative for smoking/alcohol/drug use.     ALLERGIES:  penicillin V potassium (Unknown)  naproxen (Unknown)  Cipro (Sedation/Somnol)  amoxicillin (Other (Mild to Mod))  sulfonamides (Unknown)  cephalexin (Unknown)    MEDICATIONS:  STANDING MEDICATIONS  influenza  Vaccine (HIGH DOSE) 0.7 milliLiter(s) IntraMuscular once  lactulose Syrup 15 Gram(s) Oral once  losartan 50 milliGRAM(s) Oral daily  pantoprazole    Tablet 40 milliGRAM(s) Oral before breakfast  rivaroxaban 20 milliGRAM(s) Oral with dinner  senna 2 Tablet(s) Oral at bedtime  sodium chloride 0.9%. 1000 milliLiter(s) IV Continuous <Continuous>  sotalol. 80 milliGRAM(s) Oral every 12 hours  vitamin A &amp; D Ointment 1 Application(s) Topical every 6 hours    PRN MEDICATIONS  acetaminophen     Tablet .. 650 milliGRAM(s) Oral every 6 hours PRN  melatonin 3 milliGRAM(s) Oral at bedtime PRN    VITALS:   T(F): 98.3  HR: 101  BP: 119/68  RR: 18  SpO2: 94%    LABS:    Culture - Urine (collected 17 Jan 2024 12:01)  Source: Clean Catch Clean Catch (Midstream)  Final Report (19 Jan 2024 09:07):    <10,000 CFU/mL Normal Urogenital Sandra    RADIOLOGY:  Xray Kidney Ureter Bladder (01.19.24): Fecal impaction    PHYSICAL EXAM:  GEN: No acute distress  LUNGS: Clear to auscultation bilaterally   HEART: S1/S2 present. RRR.   ABD: Soft, non-tender, non-distended. Bowel sounds present  EXT: NC/NC/NE/2+PP/DUDLEY  SKIN: intact   NEURO: AAOX3

## 2024-01-20 NOTE — PROGRESS NOTE ADULT - ASSESSMENT
83 yr old femal with hx of A-fib on Xarelto s/p ablation one week ago patient reports she was laying in bed a lot since. Patient reports she has had no bm for 9 days but passing gas.    # fecal impaction with stercoral colitis   - symptomatic treatement - was unable to disimpact in ER, sp disimpaction by GI on 1/18 - still stool residual  - Xray Kidney Ureter Bladder (01.19.24): Fecal impaction  - s/p manual disimpaction mild stool burden removed 1/18/24  - f/u KUB  - tap water enemas q4h when patient has spontaneous bms would start oral bowel regimen, only one enema was given yesterday after I gave enemas, please hold oral bowel regimen until patient has spontaneous bms  - passing flatus  - GI eval if endoscopic retrieval needed if failure of medications    # mild asymptomatic hyponatremia   - hold HCTZ for now    # chronic afib   - s/p multiple DCCV failed, s/p cryoablation 1 week ago - back in afib now   - c/w xarelto , sotolol rate controlled.    # hx of temproal arteritis   - resume home meds after dc    # urinary retention due to fecal impaction   - keep murray for now, once fecal impaction improved and ambulating can do TOV.

## 2024-01-21 LAB
ANION GAP SERPL CALC-SCNC: 9 MMOL/L — SIGNIFICANT CHANGE UP (ref 7–14)
BASOPHILS # BLD AUTO: 0.03 K/UL — SIGNIFICANT CHANGE UP (ref 0–0.2)
BASOPHILS NFR BLD AUTO: 0.5 % — SIGNIFICANT CHANGE UP (ref 0–1)
BUN SERPL-MCNC: 5 MG/DL — LOW (ref 10–20)
CALCIUM SERPL-MCNC: 7.3 MG/DL — LOW (ref 8.4–10.5)
CHLORIDE SERPL-SCNC: 96 MMOL/L — LOW (ref 98–110)
CO2 SERPL-SCNC: 29 MMOL/L — SIGNIFICANT CHANGE UP (ref 17–32)
CREAT SERPL-MCNC: <0.5 MG/DL — LOW (ref 0.7–1.5)
EGFR: 99 ML/MIN/1.73M2 — SIGNIFICANT CHANGE UP
EOSINOPHIL # BLD AUTO: 0.08 K/UL — SIGNIFICANT CHANGE UP (ref 0–0.7)
EOSINOPHIL NFR BLD AUTO: 1.4 % — SIGNIFICANT CHANGE UP (ref 0–8)
GLUCOSE SERPL-MCNC: 99 MG/DL — SIGNIFICANT CHANGE UP (ref 70–99)
HCT VFR BLD CALC: 33.7 % — LOW (ref 37–47)
HGB BLD-MCNC: 11.5 G/DL — LOW (ref 12–16)
IMM GRANULOCYTES NFR BLD AUTO: 0.3 % — SIGNIFICANT CHANGE UP (ref 0.1–0.3)
LYMPHOCYTES # BLD AUTO: 0.72 K/UL — LOW (ref 1.2–3.4)
LYMPHOCYTES # BLD AUTO: 12.2 % — LOW (ref 20.5–51.1)
MAGNESIUM SERPL-MCNC: 1.8 MG/DL — SIGNIFICANT CHANGE UP (ref 1.8–2.4)
MCHC RBC-ENTMCNC: 32.2 PG — HIGH (ref 27–31)
MCHC RBC-ENTMCNC: 34.1 G/DL — SIGNIFICANT CHANGE UP (ref 32–37)
MCV RBC AUTO: 94.4 FL — SIGNIFICANT CHANGE UP (ref 81–99)
MONOCYTES # BLD AUTO: 0.59 K/UL — SIGNIFICANT CHANGE UP (ref 0.1–0.6)
MONOCYTES NFR BLD AUTO: 10 % — HIGH (ref 1.7–9.3)
NEUTROPHILS # BLD AUTO: 4.44 K/UL — SIGNIFICANT CHANGE UP (ref 1.4–6.5)
NEUTROPHILS NFR BLD AUTO: 75.6 % — HIGH (ref 42.2–75.2)
NRBC # BLD: 0 /100 WBCS — SIGNIFICANT CHANGE UP (ref 0–0)
PLATELET # BLD AUTO: 205 K/UL — SIGNIFICANT CHANGE UP (ref 130–400)
PMV BLD: 8.7 FL — SIGNIFICANT CHANGE UP (ref 7.4–10.4)
POTASSIUM SERPL-MCNC: 2.9 MMOL/L — LOW (ref 3.5–5)
POTASSIUM SERPL-SCNC: 2.9 MMOL/L — LOW (ref 3.5–5)
RBC # BLD: 3.57 M/UL — LOW (ref 4.2–5.4)
RBC # FLD: 13 % — SIGNIFICANT CHANGE UP (ref 11.5–14.5)
SODIUM SERPL-SCNC: 134 MMOL/L — LOW (ref 135–146)
WBC # BLD: 5.88 K/UL — SIGNIFICANT CHANGE UP (ref 4.8–10.8)
WBC # FLD AUTO: 5.88 K/UL — SIGNIFICANT CHANGE UP (ref 4.8–10.8)

## 2024-01-21 PROCEDURE — 74018 RADEX ABDOMEN 1 VIEW: CPT | Mod: 26

## 2024-01-21 PROCEDURE — 99232 SBSQ HOSP IP/OBS MODERATE 35: CPT

## 2024-01-21 RX ORDER — POTASSIUM CHLORIDE 20 MEQ
40 PACKET (EA) ORAL EVERY 4 HOURS
Refills: 0 | Status: COMPLETED | OUTPATIENT
Start: 2024-01-21 | End: 2024-01-21

## 2024-01-21 RX ORDER — SODIUM CHLORIDE 0.65 %
1 AEROSOL, SPRAY (ML) NASAL EVERY 8 HOURS
Refills: 0 | Status: DISCONTINUED | OUTPATIENT
Start: 2024-01-21 | End: 2024-01-30

## 2024-01-21 RX ORDER — SODIUM CHLORIDE 9 MG/ML
1000 INJECTION INTRAMUSCULAR; INTRAVENOUS; SUBCUTANEOUS
Refills: 0 | Status: DISCONTINUED | OUTPATIENT
Start: 2024-01-21 | End: 2024-01-22

## 2024-01-21 RX ADMIN — PANTOPRAZOLE SODIUM 40 MILLIGRAM(S): 20 TABLET, DELAYED RELEASE ORAL at 06:04

## 2024-01-21 RX ADMIN — Medication 80 MILLIGRAM(S): at 18:33

## 2024-01-21 RX ADMIN — Medication 40 MILLIEQUIVALENT(S): at 16:30

## 2024-01-21 RX ADMIN — LACTULOSE 15 GRAM(S): 10 SOLUTION ORAL at 12:47

## 2024-01-21 RX ADMIN — RIVAROXABAN 20 MILLIGRAM(S): KIT at 18:33

## 2024-01-21 RX ADMIN — LOSARTAN POTASSIUM 50 MILLIGRAM(S): 100 TABLET, FILM COATED ORAL at 06:05

## 2024-01-21 RX ADMIN — Medication 1 APPLICATION(S): at 06:05

## 2024-01-21 RX ADMIN — Medication 80 MILLIGRAM(S): at 06:05

## 2024-01-21 RX ADMIN — Medication 40 MILLIEQUIVALENT(S): at 21:50

## 2024-01-21 NOTE — CHART NOTE - NSCHARTNOTEFT_GEN_A_CORE
Rectal exam done to check for Fecal impaction: stool soft, no fecal; impaction  - patient informed she must try to ambulate to stimulate bowel movements  - she was also told we will continue enemas Rectal exam done to check for Fecal impaction: stool soft, no fecal; impaction  - patient informed she must try to ambulate to stimulate bowel movements  - she was also told we will continue enemas  - NPO for now; on IVF

## 2024-01-21 NOTE — PROGRESS NOTE ADULT - SUBJECTIVE AND OBJECTIVE BOX
SUBJECTIVE:    Patient is a 83y old Female who presents with a chief complaint of Constipation (20 Jan 2024 10:51)    Currently admitted to medicine with the primary diagnosis of Stercoral colitis     Today is hospital day 4d. This morning she is resting in bed and reports no new issues or overnight events. Today morning patient had 1 large BM.    PAST MEDICAL & SURGICAL HISTORY  Atrial fibrillation    HTN (hypertension)    History of temporal arteritis    History of temporal artery biopsy    Osteoporosis    H/O temporal arteritis    S/P cataract surgery      SOCIAL HISTORY:  Negative for smoking/alcohol/drug use.     ALLERGIES:  penicillin V potassium (Unknown)  naproxen (Unknown)  Cipro (Sedation/Somnol)  amoxicillin (Other (Mild to Mod))  sulfonamides (Unknown)  cephalexin (Unknown)    MEDICATIONS:  STANDING MEDICATIONS  influenza  Vaccine (HIGH DOSE) 0.7 milliLiter(s) IntraMuscular once  lactulose Syrup 15 Gram(s) Oral two times a day  losartan 50 milliGRAM(s) Oral daily  pantoprazole    Tablet 40 milliGRAM(s) Oral before breakfast  potassium chloride    Tablet ER 40 milliEquivalent(s) Oral every 4 hours  rivaroxaban 20 milliGRAM(s) Oral with dinner  senna 2 Tablet(s) Oral at bedtime  sotalol. 80 milliGRAM(s) Oral every 12 hours  vitamin A &amp; D Ointment 1 Application(s) Topical every 6 hours    PRN MEDICATIONS  acetaminophen     Tablet .. 650 milliGRAM(s) Oral every 6 hours PRN  melatonin 3 milliGRAM(s) Oral at bedtime PRN  sodium chloride 0.65% Nasal 1 Spray(s) Both Nostrils every 8 hours PRN    VITALS:   T(F): 96.8  HR: 110  BP: 131/66  RR: 18  SpO2: 95%    LABS:                        11.5   5.88  )-----------( 205      ( 21 Jan 2024 07:16 )             33.7     01-21    134<L>  |  96<L>  |  5<L>  ----------------------------<  99  2.9<L>   |  29  |  <0.5<L>    Ca    7.3<L>      21 Jan 2024 07:16  Mg     1.8     01-21        Urinalysis Basic - ( 21 Jan 2024 07:16 )    Color: x / Appearance: x / SG: x / pH: x  Gluc: 99 mg/dL / Ketone: x  / Bili: x / Urobili: x   Blood: x / Protein: x / Nitrite: x   Leuk Esterase: x / RBC: x / WBC x   Sq Epi: x / Non Sq Epi: x / Bacteria: x    RADIOLOGY:  Xray Kidney Ureter Bladder (01.21.24): large stool burden, improved compared to yesterday       PHYSICAL EXAM:  GEN: No acute distress  LUNGS: Clear to auscultation bilaterally   HEART: S1/S2 present. irregularly irregular .   ABD: Soft, non-tender, non-distended. Bowel sounds present  EXT: NC/NC/NE/2+PP/DUDLEY  NEURO: AAOX3  SKIN: intact

## 2024-01-21 NOTE — PROGRESS NOTE ADULT - ASSESSMENT
83 yr old femal with hx of A-fib on Xarelto s/p ablation one week ago patient reports she was laying in bed a lot since. Patient reports she has had no bm for 9 days but passing gas.    # Fecal impaction with stercoral colitis   - had 1 BM today morning  - symptomatic treatement - was unable to disimpact in ER, sp disimpaction by GI on 1/18 - still stool residual  - Xray Kidney Ureter Bladder (01.21.24): large stool burden, improved compared to yesterday   - f/u KUB in AM  - mineral water enemas q4h   - passing flatus  - GI eval if endoscopic retrieval needed if failure of medications    # Chronic Afb   - s/p multiple DCCV failed, s/p cryoablation 1 week ago - back in afib now   - tele: afib with RVR - likely secondary to constipation -- pt stable, will monitor and treat primary cause   - c/w Xarelto , Sotalol rate controlled    # hx of temporal arteritis   - resume home meds after dc    # Urinary Retention due to fecal impaction   - keep Maciel for now, once fecal impaction improved and ambulating can do TOV    # DVT Ppx   - on Xarelto    Full Code

## 2024-01-22 LAB
ANION GAP SERPL CALC-SCNC: 10 MMOL/L — SIGNIFICANT CHANGE UP (ref 7–14)
BASOPHILS # BLD AUTO: 0.03 K/UL — SIGNIFICANT CHANGE UP (ref 0–0.2)
BASOPHILS NFR BLD AUTO: 0.6 % — SIGNIFICANT CHANGE UP (ref 0–1)
BUN SERPL-MCNC: 7 MG/DL — LOW (ref 10–20)
CALCIUM SERPL-MCNC: 7.2 MG/DL — LOW (ref 8.4–10.5)
CHLORIDE SERPL-SCNC: 99 MMOL/L — SIGNIFICANT CHANGE UP (ref 98–110)
CO2 SERPL-SCNC: 26 MMOL/L — SIGNIFICANT CHANGE UP (ref 17–32)
CREAT SERPL-MCNC: <0.5 MG/DL — LOW (ref 0.7–1.5)
EGFR: 99 ML/MIN/1.73M2 — SIGNIFICANT CHANGE UP
EOSINOPHIL # BLD AUTO: 0.12 K/UL — SIGNIFICANT CHANGE UP (ref 0–0.7)
EOSINOPHIL NFR BLD AUTO: 2.5 % — SIGNIFICANT CHANGE UP (ref 0–8)
GLUCOSE SERPL-MCNC: 89 MG/DL — SIGNIFICANT CHANGE UP (ref 70–99)
HCT VFR BLD CALC: 34 % — LOW (ref 37–47)
HGB BLD-MCNC: 11.9 G/DL — LOW (ref 12–16)
IMM GRANULOCYTES NFR BLD AUTO: 0.2 % — SIGNIFICANT CHANGE UP (ref 0.1–0.3)
LYMPHOCYTES # BLD AUTO: 0.98 K/UL — LOW (ref 1.2–3.4)
LYMPHOCYTES # BLD AUTO: 20.1 % — LOW (ref 20.5–51.1)
MAGNESIUM SERPL-MCNC: 1.9 MG/DL — SIGNIFICANT CHANGE UP (ref 1.8–2.4)
MCHC RBC-ENTMCNC: 32.4 PG — HIGH (ref 27–31)
MCHC RBC-ENTMCNC: 35 G/DL — SIGNIFICANT CHANGE UP (ref 32–37)
MCV RBC AUTO: 92.6 FL — SIGNIFICANT CHANGE UP (ref 81–99)
MONOCYTES # BLD AUTO: 0.4 K/UL — SIGNIFICANT CHANGE UP (ref 0.1–0.6)
MONOCYTES NFR BLD AUTO: 8.2 % — SIGNIFICANT CHANGE UP (ref 1.7–9.3)
NEUTROPHILS # BLD AUTO: 3.33 K/UL — SIGNIFICANT CHANGE UP (ref 1.4–6.5)
NEUTROPHILS NFR BLD AUTO: 68.4 % — SIGNIFICANT CHANGE UP (ref 42.2–75.2)
NRBC # BLD: 0 /100 WBCS — SIGNIFICANT CHANGE UP (ref 0–0)
PLATELET # BLD AUTO: 234 K/UL — SIGNIFICANT CHANGE UP (ref 130–400)
PMV BLD: 8.6 FL — SIGNIFICANT CHANGE UP (ref 7.4–10.4)
POTASSIUM SERPL-MCNC: 4.5 MMOL/L — SIGNIFICANT CHANGE UP (ref 3.5–5)
POTASSIUM SERPL-SCNC: 4.5 MMOL/L — SIGNIFICANT CHANGE UP (ref 3.5–5)
RBC # BLD: 3.67 M/UL — LOW (ref 4.2–5.4)
RBC # FLD: 13.2 % — SIGNIFICANT CHANGE UP (ref 11.5–14.5)
SODIUM SERPL-SCNC: 135 MMOL/L — SIGNIFICANT CHANGE UP (ref 135–146)
TSH SERPL-MCNC: 2.13 UIU/ML — SIGNIFICANT CHANGE UP (ref 0.27–4.2)
WBC # BLD: 4.87 K/UL — SIGNIFICANT CHANGE UP (ref 4.8–10.8)
WBC # FLD AUTO: 4.87 K/UL — SIGNIFICANT CHANGE UP (ref 4.8–10.8)

## 2024-01-22 PROCEDURE — 99232 SBSQ HOSP IP/OBS MODERATE 35: CPT

## 2024-01-22 PROCEDURE — 74018 RADEX ABDOMEN 1 VIEW: CPT | Mod: 26

## 2024-01-22 RX ORDER — POLYETHYLENE GLYCOL 3350 17 G/17G
17 POWDER, FOR SOLUTION ORAL DAILY
Refills: 0 | Status: DISCONTINUED | OUTPATIENT
Start: 2024-01-22 | End: 2024-01-30

## 2024-01-22 RX ORDER — CALCIUM GLUCONATE 100 MG/ML
2 VIAL (ML) INTRAVENOUS ONCE
Refills: 0 | Status: COMPLETED | OUTPATIENT
Start: 2024-01-22 | End: 2024-01-22

## 2024-01-22 RX ORDER — SOD SULF/SODIUM/NAHCO3/KCL/PEG
4000 SOLUTION, RECONSTITUTED, ORAL ORAL ONCE
Refills: 0 | Status: COMPLETED | OUTPATIENT
Start: 2024-01-22 | End: 2024-01-22

## 2024-01-22 RX ORDER — MAGNESIUM HYDROXIDE 400 MG/1
30 TABLET, CHEWABLE ORAL DAILY
Refills: 0 | Status: DISCONTINUED | OUTPATIENT
Start: 2024-01-22 | End: 2024-01-30

## 2024-01-22 RX ADMIN — Medication 1 APPLICATION(S): at 11:21

## 2024-01-22 RX ADMIN — RIVAROXABAN 20 MILLIGRAM(S): KIT at 17:59

## 2024-01-22 RX ADMIN — Medication 1 APPLICATION(S): at 05:42

## 2024-01-22 RX ADMIN — Medication 80 MILLIGRAM(S): at 05:42

## 2024-01-22 RX ADMIN — PANTOPRAZOLE SODIUM 40 MILLIGRAM(S): 20 TABLET, DELAYED RELEASE ORAL at 05:42

## 2024-01-22 RX ADMIN — POLYETHYLENE GLYCOL 3350 17 GRAM(S): 17 POWDER, FOR SOLUTION ORAL at 11:21

## 2024-01-22 RX ADMIN — Medication 200 GRAM(S): at 11:24

## 2024-01-22 RX ADMIN — Medication 80 MILLIGRAM(S): at 17:59

## 2024-01-22 RX ADMIN — Medication 4000 MILLILITER(S): at 18:00

## 2024-01-22 RX ADMIN — LACTULOSE 15 GRAM(S): 10 SOLUTION ORAL at 17:59

## 2024-01-22 NOTE — PHYSICAL THERAPY INITIAL EVALUATION ADULT - ADDITIONAL COMMENTS
Per patient, she lives in a walk-in apartment with no steps inside or outside home; was not using any assistive device, has a cane at home but does not use it

## 2024-01-22 NOTE — PROGRESS NOTE ADULT - ATTENDING COMMENTS
agree w/ above - constipation new in onset after a fib ablation, improving w/ enemas. will give regular diet and golytely prep today and monitor for resolution. rest of recs per above note. agree w/ above - constipation new in onset after a fib ablation, improving w/ enemas. will give regular diet and golytely prep today and monitor for resolution. will benefit from outpatient colonoscopy to r/o pathology. rest of recs per above note.

## 2024-01-22 NOTE — PROGRESS NOTE ADULT - SUBJECTIVE AND OBJECTIVE BOX
Gastroenterology progress note:     Patient is a 83y old  Female who presents with a chief complaint of constipation (22 Jan 2024 11:59)       Admitted on: 01-17-24    We are following the patient for new onset constipation      Interval History:  she is feeling better overall   had small BM with enema  able to pass gas          PAST MEDICAL & SURGICAL HISTORY:  Atrial fibrillation      HTN (hypertension)      History of temporal arteritis      History of temporal artery biopsy      Osteoporosis      H/O temporal arteritis      S/P cataract surgery          MEDICATIONS  (STANDING):  influenza  Vaccine (HIGH DOSE) 0.7 milliLiter(s) IntraMuscular once  lactulose Syrup 15 Gram(s) Oral two times a day  losartan 50 milliGRAM(s) Oral daily  pantoprazole    Tablet 40 milliGRAM(s) Oral before breakfast  polyethylene glycol 3350 17 Gram(s) Oral daily  rivaroxaban 20 milliGRAM(s) Oral with dinner  senna 2 Tablet(s) Oral at bedtime  sotalol. 80 milliGRAM(s) Oral every 12 hours  vitamin A &amp; D Ointment 1 Application(s) Topical every 6 hours    MEDICATIONS  (PRN):  acetaminophen     Tablet .. 650 milliGRAM(s) Oral every 6 hours PRN Temp greater or equal to 38C (100.4F), Mild Pain (1 - 3)  magnesium hydroxide Suspension 30 milliLiter(s) Oral daily PRN Constipation  melatonin 3 milliGRAM(s) Oral at bedtime PRN Insomnia  sodium chloride 0.65% Nasal 1 Spray(s) Both Nostrils every 8 hours PRN Nasal Congestion      Allergies  penicillin V potassium (Unknown)  naproxen (Unknown)  Cipro (Sedation/Somnol)  amoxicillin (Other (Mild to Mod))  sulfonamides (Unknown)  cephalexin (Unknown)      Review of Systems:   Cardiovascular:  No Chest Pain, No Palpitations  Respiratory:  No Cough, No Dyspnea  Gastrointestinal:  As described in HPI    Physical Examination:  T(C): 35.7 (01-22-24 @ 14:00), Max: 35.9 (01-21-24 @ 20:50)  HR: 153 (01-22-24 @ 14:00) (97 - 153)  BP: 93/64 (01-22-24 @ 14:00) (93/64 - 134/83)  RR: 18 (01-22-24 @ 14:00) (18 - 18)  SpO2: 97% (01-22-24 @ 14:00) (95% - 97%)      01-21-24 @ 07:01  -  01-22-24 @ 07:00  --------------------------------------------------------  IN: 0 mL / OUT: 1800 mL / NET: -1800 mL    01-22-24 @ 07:01  -  01-22-24 @ 14:47  --------------------------------------------------------  IN: 0 mL / OUT: 750 mL / NET: -750 mL      Constitutional: No acute distress.  Respiratory:  No signs of respiratory distress. Lung sounds are clear bilaterally.  Cardiovascular:  S1 S2, Regular rate and rhythm.  Abdominal: Abdomen is soft, symmetric, and non-tender without distention.         Data:                        11.9   4.87  )-----------( 234      ( 22 Jan 2024 06:40 )             34.0     Hgb trend:  11.9  01-22-24 @ 06:40  11.5  01-21-24 @ 07:16        01-22    135  |  99  |  7<L>  ----------------------------<  89  4.5   |  26  |  <0.5<L>    Ca    7.2<L>      22 Jan 2024 06:40  Mg     1.9     01-22      Liver panel trend:  TBili 0.4   /   AST 20   /   ALT 20   /   AlkP 40   /   Tptn 6.1   /   Alb 4.0    /   DBili --      01-17             Radiology:

## 2024-01-22 NOTE — PHYSICAL THERAPY INITIAL EVALUATION ADULT - GAIT DEVIATIONS NOTED, PT EVAL
stooped posture, dec heel strike/pushoff/decreased lanre/decreased step length/decreased weight-shifting ability

## 2024-01-22 NOTE — PHYSICAL THERAPY INITIAL EVALUATION ADULT - LEVEL OF INDEPENDENCE: SIT/STAND, REHAB EVAL
"Chief Complaint   Patient presents with     Physical       Initial /80 (BP Location: Right arm, Patient Position: Chair, Cuff Size: Adult Large)  Pulse 102  Temp 98  F (36.7  C) (Oral)  Resp 18  Ht 5' 8.5\" (1.74 m)  Wt 215 lb (97.5 kg)  SpO2 96%  BMI 32.22 kg/m2 Estimated body mass index is 32.22 kg/(m^2) as calculated from the following:    Height as of this encounter: 5' 8.5\" (1.74 m).    Weight as of this encounter: 215 lb (97.5 kg).  Medication Reconciliation: complete     Leandra Mckay MA       "
minimum assist (75% patients effort)

## 2024-01-22 NOTE — PROGRESS NOTE ADULT - SUBJECTIVE AND OBJECTIVE BOX
SUBJECTIVE:    Patient is a 83y old Female who presents with a chief complaint of Constipation (20 Jan 2024 10:51)    Currently admitted to medicine with the primary diagnosis of Stercoral colitis    Pt is still having constipation as she is on day 13 of not having a BM. She has not eaten in 48 hrs and would like food. She is declining a tap enema today as it causes tachycardia. She does not have any other complaints.     PAST MEDICAL & SURGICAL HISTORY  Atrial fibrillation    HTN (hypertension)    History of temporal arteritis    History of temporal artery biopsy    Osteoporosis    H/O temporal arteritis    S/P cataract surgery      SOCIAL HISTORY:  Negative for smoking/alcohol/drug use.     ALLERGIES:  penicillin V potassium (Unknown)  naproxen (Unknown)  Cipro (Sedation/Somnol)  amoxicillin (Other (Mild to Mod))  sulfonamides (Unknown)  cephalexin (Unknown)    MEDICATIONS:  STANDING MEDICATIONS  influenza  Vaccine (HIGH DOSE) 0.7 milliLiter(s) IntraMuscular once  lactulose Syrup 15 Gram(s) Oral two times a day  losartan 50 milliGRAM(s) Oral daily  pantoprazole    Tablet 40 milliGRAM(s) Oral before breakfast  potassium chloride    Tablet ER 40 milliEquivalent(s) Oral every 4 hours  rivaroxaban 20 milliGRAM(s) Oral with dinner  senna 2 Tablet(s) Oral at bedtime  sotalol. 80 milliGRAM(s) Oral every 12 hours  vitamin A &amp; D Ointment 1 Application(s) Topical every 6 hours    PRN MEDICATIONS  acetaminophen     Tablet .. 650 milliGRAM(s) Oral every 6 hours PRN  melatonin 3 milliGRAM(s) Oral at bedtime PRN  sodium chloride 0.65% Nasal 1 Spray(s) Both Nostrils every 8 hours PRN    VITALS:   T(F): 96.8  HR: 110  BP: 131/66  RR: 18  SpO2: 95%    LABS:                        11.5   5.88  )-----------( 205      ( 21 Jan 2024 07:16 )             33.7     01-21    134<L>  |  96<L>  |  5<L>  ----------------------------<  99  2.9<L>   |  29  |  <0.5<L>    Ca    7.3<L>      21 Jan 2024 07:16  Mg     1.8     01-21        Urinalysis Basic - ( 21 Jan 2024 07:16 )    Color: x / Appearance: x / SG: x / pH: x  Gluc: 99 mg/dL / Ketone: x  / Bili: x / Urobili: x   Blood: x / Protein: x / Nitrite: x   Leuk Esterase: x / RBC: x / WBC x   Sq Epi: x / Non Sq Epi: x / Bacteria: x    RADIOLOGY:  Xray Kidney Ureter Bladder (01.21.24): large stool burden, improved compared to yesterday       PHYSICAL EXAM:  GEN: No acute distress  LUNGS: Clear to auscultation bilaterally   HEART: S1/S2 present. irregularly irregular .   ABD: Soft, non-tender, non-distended. Bowel sounds present  EXT: NC/NC/NE/2+PP/DUDLEY  NEURO: AAOX3  SKIN: intact

## 2024-01-22 NOTE — PROGRESS NOTE ADULT - ASSESSMENT
diet as tolerated   golytely   from tomorrow miralax TID 83yFemale pmh A-fib on Xarelto s/p ablation one week ago prior to hospital admission came to the hospital for constipation. Gi was following for constipation.     #) New onset constipation DD: ?immobility after the procedure vs less likely anesthesia during the ablation (unusual for the medications for long term effects vs r/o malignancy   -used to have regular BM before ablation   -TSH normal, vitals normal hemodynamically stable   -initial CT showed  rectum measuring 7.5 cm in diameter with mild surrounding inflammatory changes, suspicious for developing stercoral proctitis. Large diffuse colonic stool burden.  -patient reports last colonoscopy 8 years ago with Dr. Hunt wn  -So far on enema had one BM yesterday with enema, had disimpaction 1/18   -Xray KUB showed improvement compared to admission   -Abdomen is soft on examination     Recs:   Diet as tolerated (high fiber diet)   encourage ambulation   Avoid anticholinergics opiates CCB which can dec motility of the colon   keep electrolytes within normal limits   will given golytely for constipation   Senna 2 tabs at bedtime   xray KUB every day   will benefit from colonoscopy OP follow up with Dr. Hunt  83 y.o F w/ hx of A-fib on Xarelto s/p ablation one week ago prior to hospital admission came to the hospital for constipation. GI was following for constipation.     #) New onset constipation DD: ?immobility after the procedure (? vagal nerve injury) vs less likely 2/2 anesthesia from the ablation (unusual for the medications for long term effects vs r/o malignancy)  -used to have regular BM before ablation   -TSH normal, vitals normal hemodynamically stable   -initial CT showed  rectum measuring 7.5 cm in diameter with mild surrounding inflammatory changes, suspicious for developing stercoral proctitis. Large diffuse colonic stool burden.  -patient reports last colonoscopy 8 years ago with Dr. Hunt [facility]  -So far on enema had one BM yesterday with enema, had disimpaction 1/18   -Xray KUB showed improvement compared to admission   -Abdomen is soft on examination     Recs:   Diet as tolerated (high fiber diet)   encourage ambulation   Avoid anticholinergics opiates CCB which can dec motility of the colon   keep electrolytes within normal limits   will given golytely for constipation   Senna 2 tabs at bedtime   xray KUB every day   will benefit from colonoscopy OP follow up with Dr. Hunt

## 2024-01-22 NOTE — PHYSICAL THERAPY INITIAL EVALUATION ADULT - GAIT DISTANCE, PT EVAL
Physician triage: this note is for triage purposes only.  This patient was seen and evaluated by myself.  My evaluation is not meant to provide definitive treatment nor is this note intended to diagnose or disposition the patient unless otherwise stated.    73-year-old female with history of CKD with dialysis, heart transplant presents to the ED for complaint of entire left-sided body pain x2 days.  Patient denies injury or trauma denies chest pain or shortness of breath denies cough runny nose and fever she states she was due for dialysis but could not make it to the pain    On exam patient appears weak, lungs are clear anterior posterior, heart sounds are regular and tachycardic  We will order pain medication   x 2/25 feet

## 2024-01-22 NOTE — PROGRESS NOTE ADULT - ASSESSMENT
83 yr old female with hx of A-fib on Xarelto s/p ablation one week ago patient reports she was laying in bed a lot since. Patient reports she has had no bm for since 1/9/24.    #Fecal impaction with stercoral colitis   - had a small BM after disimpaction but none since  - passing gas  - refusing enemas today as its causing tachycardia for her  - tele shows afib  - pt failed ablation performed on 1/10/23  - disimpacted in the ED and by GI on 1/18  - repeat KUB daily until constipation relieved  - K corrected, Ca supplementation given  - added po laxatives  - encouraged PT/OOB  - will not be able to do a TOV until constipation is relieved  - started liquid diet, advance as tolerated  - PT recommended RW and rehab vs home PT    # Chronic Afb   - s/p multiple DCCV failed, s/p cryoablation 1 week ago - back in afib now   - tele: afib with RVR - likely secondary to constipation -- pt stable, will monitor and treat primary cause   - c/w Xarelto , Sotalol rate controlled    # hx of temporal arteritis   - normally on actemera and prolia at home  - calcium replenished    # Urinary Retention due to fecal impaction   - keep Maciel for now, once fecal impaction improved and ambulating can do TOV    # DVT Ppx   - on Xarelto    Full Code

## 2024-01-22 NOTE — PHYSICAL THERAPY INITIAL EVALUATION ADULT - PERTINENT HX OF CURRENT PROBLEM, REHAB EVAL
82 y/o female admitted with diagnosis of Afib, Indeterminate colitis; c/o constipation  x 1 week since cardiac ablation done, with lower abdominal cramping, mild nausea, decreased appetite, not alleviated by suppository, CT shows Stercoral proctitis, s/p disimpaction by GI on 1/18/24

## 2024-01-22 NOTE — PHYSICAL THERAPY INITIAL EVALUATION ADULT - GENERAL OBSERVATIONS, REHAB EVAL
09:00-09:30 Chart reviewed. Pt encountered supine im bed, may be seen by Physical Therapist as confirmed with Nurse. Patient denied pain at rest and ready to get up now; +tele/ heplock LUE/ murray catheter/ ecchymoses groin L>R

## 2024-01-23 LAB
ALBUMIN SERPL ELPH-MCNC: 3.4 G/DL — LOW (ref 3.5–5.2)
ALP SERPL-CCNC: 40 U/L — SIGNIFICANT CHANGE UP (ref 30–115)
ALT FLD-CCNC: 15 U/L — SIGNIFICANT CHANGE UP (ref 0–41)
ANION GAP SERPL CALC-SCNC: 10 MMOL/L — SIGNIFICANT CHANGE UP (ref 7–14)
AST SERPL-CCNC: 16 U/L — SIGNIFICANT CHANGE UP (ref 0–41)
BASOPHILS # BLD AUTO: 0.03 K/UL — SIGNIFICANT CHANGE UP (ref 0–0.2)
BASOPHILS NFR BLD AUTO: 0.7 % — SIGNIFICANT CHANGE UP (ref 0–1)
BILIRUB SERPL-MCNC: 0.5 MG/DL — SIGNIFICANT CHANGE UP (ref 0.2–1.2)
BUN SERPL-MCNC: 9 MG/DL — LOW (ref 10–20)
CALCIUM SERPL-MCNC: 8.2 MG/DL — LOW (ref 8.4–10.5)
CHLORIDE SERPL-SCNC: 101 MMOL/L — SIGNIFICANT CHANGE UP (ref 98–110)
CO2 SERPL-SCNC: 26 MMOL/L — SIGNIFICANT CHANGE UP (ref 17–32)
CREAT SERPL-MCNC: 0.5 MG/DL — LOW (ref 0.7–1.5)
EGFR: 94 ML/MIN/1.73M2 — SIGNIFICANT CHANGE UP
EOSINOPHIL # BLD AUTO: 0.2 K/UL — SIGNIFICANT CHANGE UP (ref 0–0.7)
EOSINOPHIL NFR BLD AUTO: 4.4 % — SIGNIFICANT CHANGE UP (ref 0–8)
GLUCOSE SERPL-MCNC: 102 MG/DL — HIGH (ref 70–99)
HCT VFR BLD CALC: 32.7 % — LOW (ref 37–47)
HGB BLD-MCNC: 11.7 G/DL — LOW (ref 12–16)
IMM GRANULOCYTES NFR BLD AUTO: 0.2 % — SIGNIFICANT CHANGE UP (ref 0.1–0.3)
LYMPHOCYTES # BLD AUTO: 0.96 K/UL — LOW (ref 1.2–3.4)
LYMPHOCYTES # BLD AUTO: 21.1 % — SIGNIFICANT CHANGE UP (ref 20.5–51.1)
MCHC RBC-ENTMCNC: 33 PG — HIGH (ref 27–31)
MCHC RBC-ENTMCNC: 35.8 G/DL — SIGNIFICANT CHANGE UP (ref 32–37)
MCV RBC AUTO: 92.1 FL — SIGNIFICANT CHANGE UP (ref 81–99)
MONOCYTES # BLD AUTO: 0.51 K/UL — SIGNIFICANT CHANGE UP (ref 0.1–0.6)
MONOCYTES NFR BLD AUTO: 11.2 % — HIGH (ref 1.7–9.3)
NEUTROPHILS # BLD AUTO: 2.85 K/UL — SIGNIFICANT CHANGE UP (ref 1.4–6.5)
NEUTROPHILS NFR BLD AUTO: 62.4 % — SIGNIFICANT CHANGE UP (ref 42.2–75.2)
NRBC # BLD: 0 /100 WBCS — SIGNIFICANT CHANGE UP (ref 0–0)
PLATELET # BLD AUTO: 241 K/UL — SIGNIFICANT CHANGE UP (ref 130–400)
PMV BLD: 8.6 FL — SIGNIFICANT CHANGE UP (ref 7.4–10.4)
POTASSIUM SERPL-MCNC: 4.3 MMOL/L — SIGNIFICANT CHANGE UP (ref 3.5–5)
POTASSIUM SERPL-SCNC: 4.3 MMOL/L — SIGNIFICANT CHANGE UP (ref 3.5–5)
PROT SERPL-MCNC: 5.3 G/DL — LOW (ref 6–8)
RBC # BLD: 3.55 M/UL — LOW (ref 4.2–5.4)
RBC # FLD: 13.3 % — SIGNIFICANT CHANGE UP (ref 11.5–14.5)
SODIUM SERPL-SCNC: 137 MMOL/L — SIGNIFICANT CHANGE UP (ref 135–146)
WBC # BLD: 4.56 K/UL — LOW (ref 4.8–10.8)
WBC # FLD AUTO: 4.56 K/UL — LOW (ref 4.8–10.8)

## 2024-01-23 PROCEDURE — 99232 SBSQ HOSP IP/OBS MODERATE 35: CPT

## 2024-01-23 PROCEDURE — 74018 RADEX ABDOMEN 1 VIEW: CPT | Mod: 26

## 2024-01-23 PROCEDURE — 99233 SBSQ HOSP IP/OBS HIGH 50: CPT

## 2024-01-23 RX ORDER — SODIUM CHLORIDE 9 MG/ML
1000 INJECTION INTRAMUSCULAR; INTRAVENOUS; SUBCUTANEOUS
Refills: 0 | Status: DISCONTINUED | OUTPATIENT
Start: 2024-01-23 | End: 2024-01-24

## 2024-01-23 RX ORDER — DILTIAZEM HCL 120 MG
30 CAPSULE, EXT RELEASE 24 HR ORAL ONCE
Refills: 0 | Status: COMPLETED | OUTPATIENT
Start: 2024-01-23 | End: 2024-01-23

## 2024-01-23 RX ORDER — ALPRAZOLAM 0.25 MG
0.5 TABLET ORAL ONCE
Refills: 0 | Status: COMPLETED | OUTPATIENT
Start: 2024-01-23 | End: 2024-01-23

## 2024-01-23 RX ADMIN — Medication 1 APPLICATION(S): at 17:09

## 2024-01-23 RX ADMIN — RIVAROXABAN 20 MILLIGRAM(S): KIT at 17:09

## 2024-01-23 RX ADMIN — LOSARTAN POTASSIUM 50 MILLIGRAM(S): 100 TABLET, FILM COATED ORAL at 05:03

## 2024-01-23 RX ADMIN — Medication 1 APPLICATION(S): at 11:50

## 2024-01-23 RX ADMIN — LACTULOSE 15 GRAM(S): 10 SOLUTION ORAL at 05:03

## 2024-01-23 RX ADMIN — Medication 1 APPLICATION(S): at 05:01

## 2024-01-23 RX ADMIN — Medication 30 MILLIGRAM(S): at 17:40

## 2024-01-23 RX ADMIN — POLYETHYLENE GLYCOL 3350 17 GRAM(S): 17 POWDER, FOR SOLUTION ORAL at 11:50

## 2024-01-23 RX ADMIN — SODIUM CHLORIDE 80 MILLILITER(S): 9 INJECTION INTRAMUSCULAR; INTRAVENOUS; SUBCUTANEOUS at 14:15

## 2024-01-23 RX ADMIN — Medication 1 APPLICATION(S): at 02:12

## 2024-01-23 RX ADMIN — Medication 80 MILLIGRAM(S): at 03:16

## 2024-01-23 RX ADMIN — Medication 80 MILLIGRAM(S): at 17:09

## 2024-01-23 RX ADMIN — PANTOPRAZOLE SODIUM 40 MILLIGRAM(S): 20 TABLET, DELAYED RELEASE ORAL at 05:03

## 2024-01-23 NOTE — PROGRESS NOTE ADULT - SUBJECTIVE AND OBJECTIVE BOX
SUBJECTIVE:    Patient is a 83y old Female who presents with a chief complaint of Constipation (20 Jan 2024 10:51)    Currently admitted to medicine with the primary diagnosis of Stercoral colitis    Pt had 2 very small bowel movements. KUB demonstrating large rectal stool burden so will have to resume the enemas. She is tachycardic overnight and she seemed very anxious and stressed out this am. Stated she did not sleep and had to argue to get her meals which were withheld and not cleaned. She kept getting woken up overnight. Golytely prep care home done this am. In the afternoon, her stomach was distended, so stopped lactulose.     PAST MEDICAL & SURGICAL HISTORY  Atrial fibrillation    HTN (hypertension)    History of temporal arteritis    History of temporal artery biopsy    Osteoporosis    H/O temporal arteritis    S/P cataract surgery      SOCIAL HISTORY:  Negative for smoking/alcohol/drug use.     ALLERGIES:  penicillin V potassium (Unknown)  naproxen (Unknown)  Cipro (Sedation/Somnol)  amoxicillin (Other (Mild to Mod))  sulfonamides (Unknown)  cephalexin (Unknown)    MEDICATIONS:  STANDING MEDICATIONS  influenza  Vaccine (HIGH DOSE) 0.7 milliLiter(s) IntraMuscular once  lactulose Syrup 15 Gram(s) Oral two times a day  losartan 50 milliGRAM(s) Oral daily  pantoprazole    Tablet 40 milliGRAM(s) Oral before breakfast  potassium chloride    Tablet ER 40 milliEquivalent(s) Oral every 4 hours  rivaroxaban 20 milliGRAM(s) Oral with dinner  senna 2 Tablet(s) Oral at bedtime  sotalol. 80 milliGRAM(s) Oral every 12 hours  vitamin A &amp; D Ointment 1 Application(s) Topical every 6 hours    PRN MEDICATIONS  acetaminophen     Tablet .. 650 milliGRAM(s) Oral every 6 hours PRN  melatonin 3 milliGRAM(s) Oral at bedtime PRN  sodium chloride 0.65% Nasal 1 Spray(s) Both Nostrils every 8 hours PRN    VITALS:   T(F): 96.8  HR: 110  BP: 131/66  RR: 18  SpO2: 95%    LABS:                        11.5   5.88  )-----------( 205      ( 21 Jan 2024 07:16 )             33.7     01-21    134<L>  |  96<L>  |  5<L>  ----------------------------<  99  2.9<L>   |  29  |  <0.5<L>    Ca    7.3<L>      21 Jan 2024 07:16  Mg     1.8     01-21        Urinalysis Basic - ( 21 Jan 2024 07:16 )    Color: x / Appearance: x / SG: x / pH: x  Gluc: 99 mg/dL / Ketone: x  / Bili: x / Urobili: x   Blood: x / Protein: x / Nitrite: x   Leuk Esterase: x / RBC: x / WBC x   Sq Epi: x / Non Sq Epi: x / Bacteria: x    RADIOLOGY:  Xray Kidney Ureter Bladder (01.21.24): large stool burden, improved compared to yesterday       PHYSICAL EXAM:  GEN: No acute distress  LUNGS: Clear to auscultation bilaterally   HEART: S1/S2 present. irregularly irregular .   ABD: Soft, non-tender, non-distended. Bowel sounds present  EXT: NC/NC/NE/2+PP/DUDLEY  NEURO: AAOX3  SKIN: intact

## 2024-01-23 NOTE — PROGRESS NOTE ADULT - SUBJECTIVE AND OBJECTIVE BOX
83yFemale  Being followed for constipation  Interval history: Patient denies nausea, vomiting, hematemesis, melena, blood in stool, diarrhea, constipation, abdominal pain. patient having bms with Jose A pickett, prefers it to other laxatives.      PAST MEDICAL & SURGICAL HISTORY:   Atrial fibrillation      HTN (hypertension)      History of temporal arteritis      History of temporal artery biopsy      Osteoporosis      H/O temporal arteritis      S/P cataract surgery                Social History: No smoking. No alcohol. No illegal drug use.          MEDICATIONS  (STANDING):  influenza  Vaccine (HIGH DOSE) 0.7 milliLiter(s) IntraMuscular once  lactulose Syrup 15 Gram(s) Oral two times a day  losartan 50 milliGRAM(s) Oral daily  pantoprazole    Tablet 40 milliGRAM(s) Oral before breakfast  polyethylene glycol 3350 17 Gram(s) Oral daily  rivaroxaban 20 milliGRAM(s) Oral with dinner  senna 2 Tablet(s) Oral at bedtime  sodium chloride 0.9%. 1000 milliLiter(s) (80 mL/Hr) IV Continuous <Continuous>  sotalol. 80 milliGRAM(s) Oral every 12 hours  vitamin A &amp; D Ointment 1 Application(s) Topical every 6 hours    MEDICATIONS  (PRN):  acetaminophen     Tablet .. 650 milliGRAM(s) Oral every 6 hours PRN Temp greater or equal to 38C (100.4F), Mild Pain (1 - 3)  magnesium hydroxide Suspension 30 milliLiter(s) Oral daily PRN Constipation  melatonin 3 milliGRAM(s) Oral at bedtime PRN Insomnia  sodium chloride 0.65% Nasal 1 Spray(s) Both Nostrils every 8 hours PRN Nasal Congestion      Allergies:   penicillin V potassium (Unknown)  naproxen (Unknown)  Cipro (Sedation/Somnol)  amoxicillin (Other (Mild to Mod))  sulfonamides (Unknown)  cephalexin (Unknown)  Intolerances          REVIEW OF SYSTEMS:  General:  No weight loss, fevers, or chills.  Eyes:  No reported pain or visual changes  ENT:  No sore throat or runny nose.  NECK: No stiffness   CV:  No chest pain or palpitations.  Resp:  No shortness of breath, cough  GI:  No abdominal pain, nausea, vomiting, dysphagia, diarrhea +constipation. No rectal bleeding, melena, or hematemesis.  Neuro:  No tingling, numbness         VITAL SIGNS:   T(F): 95.7 (01-23-24 @ 05:04), Max: 97.1 (01-23-24 @ 03:08)  HR: 115 (01-23-24 @ 05:04) (115 - 153)  BP: 118/81 (01-23-24 @ 05:04) (93/64 - 136/88)  RR: 18 (01-23-24 @ 05:04) (18 - 18)  SpO2: 94% (01-23-24 @ 05:04) (94% - 97%)    PHYSICAL EXAM:  GENERAL: AAOx3, no acute distress.  HEAD:  Atraumatic, Normocephalic  EYES: conjunctiva and sclera clear  NECK: Supple, no JVD or thyromegaly  CHEST/LUNG: Clear to auscultation bilaterally; No wheeze, rhonchi, or rales  HEART: Regular rate and rhythm; normal S1, S2, No murmurs.  ABDOMEN: Soft, nontender, nondistended; Bowel sounds present  NEUROLOGY: No asterixis or tremor.   SKIN: Intact, no jaundice            LABS:                        11.7   4.56  )-----------( 241      ( 23 Jan 2024 06:48 )             32.7     01-23    137  |  101  |  9<L>  ----------------------------<  102<H>  4.3   |  26  |  0.5<L>    Ca    8.2<L>      23 Jan 2024 06:48  Mg     1.9     01-22    TPro  5.3<L>  /  Alb  3.4<L>  /  TBili  0.5  /  DBili  x   /  AST  16  /  ALT  15  /  AlkPhos  40  01-23    LIVER FUNCTIONS - ( 23 Jan 2024 06:48 )  Alb: 3.4 g/dL / Pro: 5.3 g/dL / ALK PHOS: 40 U/L / ALT: 15 U/L / AST: 16 U/L / GGT: x               IMAGING:    < from: Xray Kidney Ureter Bladder (01.22.24 @ 06:40) >  ACC: [id number] EXAM:  XR KUB 1 VIEW   ORDERED BY: PETE ALONSO     PROCEDURE DATE:  01/22/2024          INTERPRETATION:  Clinical Indication: Constipation.    Technique: Supine views of the abdomen    Comparison: Abdominal radiograph 1/21/2024. Chestradiograph 1/20/2024.    Findings:    Catheters and tubes: None  Bowel gas pattern: Non-obstructive bowel gas pattern. Large rectal stool   burden measuring 9.6 cm in diameter.  Calcifications: No abnormal calcifications.  Skeletal: Degenerative changes.  Other: Bibasilar lung opacities.    IMPRESSION:    Non-obstructive bowel gas pattern.    Large rectal stool burden with distention measuring 9.6 cm.    Bibasilar lung opacities.    --- End of Report ---            ELINA HERNANDEZ MD; Attending Radiologist  This document has been electronically signed. Jan 22 2024  3:01PM    < end of copied text >

## 2024-01-23 NOTE — PROGRESS NOTE ADULT - ASSESSMENT
83 y.o F w/ hx of A-fib on Xarelto s/p ablation one week ago prior to hospital admission came to the hospital for constipation. GI was following for constipation.     #) New onset constipation DD: ?immobility after the procedure (? vagal nerve injury) vs less likely 2/2 anesthesia from the ablation (unusual for the medications for long term effects vs r/o malignancy)  -used to have regular BM before ablation   -TSH normal, vitals normal hemodynamically stable   -initial CT showed  rectum measuring 7.5 cm in diameter with mild surrounding inflammatory changes, suspicious for developing stercoral proctitis. Large diffuse colonic stool burden.  -patient reports last colonoscopy 8 years ago with Dr. Hunt wn  -So far on enema had one BM yesterday with enema, had disimpaction 1/18   -Xray KUB showed improvement compared to admission   -Abdomen is soft on examination     Recs:   Diet as tolerated (high fiber diet)   encourage ambulation   -avoid lactulose as it can worsen distention   Avoid anticholinergics opiates CCB which can dec motility of the colon   keep electrolytes within normal limits   giving golytely for constipation, patient drinking it, if patient not relieved consider colorectal surgery eval for examination under anesthesia   Senna 2 tabs at bedtime   xray KUB every day   will benefit from colonoscopy OP follow up with Dr. Hunt

## 2024-01-23 NOTE — PROGRESS NOTE ADULT - ASSESSMENT
83 yr old female with hx of A-fib on Xarelto s/p ablation one week ago patient reports she was laying in bed a lot since. Patient reports severe constipation started 1/9/24.    #Fecal impaction with stercoral colitis   - had a small BM after disimpaction but none since  - passing gas  - refusing enemas today as its causing tachycardia for her  - tele shows afib  - pt failed ablation performed on 1/10/23  - disimpacted in the ED and by GI on 1/18  - repeat KUB daily until constipation relieved  - K corrected, Ca supplementation given  - added po laxatives  - encouraged PT/OOB  - will not be able to do a TOV until constipation is relieved  - started liquid diet, advance as tolerated  - PT recommended RW and rehab vs home PT    # Chronic Afb   - s/p multiple DCCV failed, s/p cryoablation on 1/9/24 - back in afib now   - tele: afib with RVR - likely secondary to constipation -- pt stable, will monitor and treat primary cause   - c/w Xarelto  - pt's HR was more uncontrolled on 1/22-1/23, but I think its because its reflexive tachycardia secondary to hypotension due to the golytely  - started pt on IVF and hoping with BP improvement, the tachycardia will also improve as HR was controlled on 1/21  - if still not controlled on 1/24, please consult cardiology for alternatives as pt's ECG shows qtc prolongation already so would not increase sotalol dose   - sotalol 80mg bid      # hx of temporal arteritis   - normally on actemera and prolia at home  - recommended that she f/u with her rheumatologist on whether to continue both medications as constipation and hypocalcemia are SE of both  - please make sure to put this info in her discharge paperwork as per pt request  - calcium replenished    # Urinary Retention due to fecal impaction   - keep Maciel for now, once fecal impaction improved and ambulating can do TOV    # DVT Ppx   - on Xarelto    Full Code    Handoff: f/u HR and cardio for sotalol alternative, f/u BMs, if pt has larger BMs can attempt a TOV

## 2024-01-24 LAB
ALBUMIN SERPL ELPH-MCNC: 3.7 G/DL — SIGNIFICANT CHANGE UP (ref 3.5–5.2)
ALP SERPL-CCNC: 40 U/L — SIGNIFICANT CHANGE UP (ref 30–115)
ALT FLD-CCNC: 22 U/L — SIGNIFICANT CHANGE UP (ref 0–41)
ANION GAP SERPL CALC-SCNC: 11 MMOL/L — SIGNIFICANT CHANGE UP (ref 7–14)
AST SERPL-CCNC: 21 U/L — SIGNIFICANT CHANGE UP (ref 0–41)
BASOPHILS # BLD AUTO: 0.02 K/UL — SIGNIFICANT CHANGE UP (ref 0–0.2)
BASOPHILS NFR BLD AUTO: 0.4 % — SIGNIFICANT CHANGE UP (ref 0–1)
BILIRUB SERPL-MCNC: 0.6 MG/DL — SIGNIFICANT CHANGE UP (ref 0.2–1.2)
BUN SERPL-MCNC: 9 MG/DL — LOW (ref 10–20)
CALCIUM SERPL-MCNC: 8.1 MG/DL — LOW (ref 8.4–10.5)
CHLORIDE SERPL-SCNC: 99 MMOL/L — SIGNIFICANT CHANGE UP (ref 98–110)
CO2 SERPL-SCNC: 26 MMOL/L — SIGNIFICANT CHANGE UP (ref 17–32)
CREAT SERPL-MCNC: 0.5 MG/DL — LOW (ref 0.7–1.5)
EGFR: 94 ML/MIN/1.73M2 — SIGNIFICANT CHANGE UP
EOSINOPHIL # BLD AUTO: 0.18 K/UL — SIGNIFICANT CHANGE UP (ref 0–0.7)
EOSINOPHIL NFR BLD AUTO: 3.6 % — SIGNIFICANT CHANGE UP (ref 0–8)
GLUCOSE SERPL-MCNC: 118 MG/DL — HIGH (ref 70–99)
HCT VFR BLD CALC: 33.8 % — LOW (ref 37–47)
HGB BLD-MCNC: 11.5 G/DL — LOW (ref 12–16)
IMM GRANULOCYTES NFR BLD AUTO: 0.2 % — SIGNIFICANT CHANGE UP (ref 0.1–0.3)
LYMPHOCYTES # BLD AUTO: 1.02 K/UL — LOW (ref 1.2–3.4)
LYMPHOCYTES # BLD AUTO: 20.6 % — SIGNIFICANT CHANGE UP (ref 20.5–51.1)
MCHC RBC-ENTMCNC: 32.5 PG — HIGH (ref 27–31)
MCHC RBC-ENTMCNC: 34 G/DL — SIGNIFICANT CHANGE UP (ref 32–37)
MCV RBC AUTO: 95.5 FL — SIGNIFICANT CHANGE UP (ref 81–99)
MONOCYTES # BLD AUTO: 0.4 K/UL — SIGNIFICANT CHANGE UP (ref 0.1–0.6)
MONOCYTES NFR BLD AUTO: 8.1 % — SIGNIFICANT CHANGE UP (ref 1.7–9.3)
NEUTROPHILS # BLD AUTO: 3.31 K/UL — SIGNIFICANT CHANGE UP (ref 1.4–6.5)
NEUTROPHILS NFR BLD AUTO: 67.1 % — SIGNIFICANT CHANGE UP (ref 42.2–75.2)
NRBC # BLD: 0 /100 WBCS — SIGNIFICANT CHANGE UP (ref 0–0)
PLATELET # BLD AUTO: 242 K/UL — SIGNIFICANT CHANGE UP (ref 130–400)
PMV BLD: 8.4 FL — SIGNIFICANT CHANGE UP (ref 7.4–10.4)
POTASSIUM SERPL-MCNC: 5.2 MMOL/L — HIGH (ref 3.5–5)
POTASSIUM SERPL-SCNC: 5.2 MMOL/L — HIGH (ref 3.5–5)
PROT SERPL-MCNC: 5.5 G/DL — LOW (ref 6–8)
RBC # BLD: 3.54 M/UL — LOW (ref 4.2–5.4)
RBC # FLD: 13.7 % — SIGNIFICANT CHANGE UP (ref 11.5–14.5)
SODIUM SERPL-SCNC: 136 MMOL/L — SIGNIFICANT CHANGE UP (ref 135–146)
WBC # BLD: 4.94 K/UL — SIGNIFICANT CHANGE UP (ref 4.8–10.8)
WBC # FLD AUTO: 4.94 K/UL — SIGNIFICANT CHANGE UP (ref 4.8–10.8)

## 2024-01-24 PROCEDURE — 99232 SBSQ HOSP IP/OBS MODERATE 35: CPT

## 2024-01-24 PROCEDURE — 93010 ELECTROCARDIOGRAM REPORT: CPT

## 2024-01-24 PROCEDURE — 99233 SBSQ HOSP IP/OBS HIGH 50: CPT

## 2024-01-24 RX ORDER — DILTIAZEM HCL 120 MG
30 CAPSULE, EXT RELEASE 24 HR ORAL EVERY 6 HOURS
Refills: 0 | Status: DISCONTINUED | OUTPATIENT
Start: 2024-01-24 | End: 2024-01-25

## 2024-01-24 RX ORDER — CHLORHEXIDINE GLUCONATE 213 G/1000ML
1 SOLUTION TOPICAL
Refills: 0 | Status: DISCONTINUED | OUTPATIENT
Start: 2024-01-24 | End: 2024-01-30

## 2024-01-24 RX ORDER — CHLORHEXIDINE GLUCONATE 213 G/1000ML
1 SOLUTION TOPICAL
Refills: 0 | Status: DISCONTINUED | OUTPATIENT
Start: 2024-01-24 | End: 2024-01-24

## 2024-01-24 RX ADMIN — Medication 1 APPLICATION(S): at 16:48

## 2024-01-24 RX ADMIN — Medication 1 APPLICATION(S): at 00:05

## 2024-01-24 RX ADMIN — Medication 30 MILLIGRAM(S): at 23:06

## 2024-01-24 RX ADMIN — SENNA PLUS 2 TABLET(S): 8.6 TABLET ORAL at 21:02

## 2024-01-24 RX ADMIN — Medication 1 APPLICATION(S): at 05:44

## 2024-01-24 RX ADMIN — PANTOPRAZOLE SODIUM 40 MILLIGRAM(S): 20 TABLET, DELAYED RELEASE ORAL at 05:44

## 2024-01-24 RX ADMIN — Medication 80 MILLIGRAM(S): at 18:02

## 2024-01-24 RX ADMIN — Medication 30 MILLIGRAM(S): at 18:38

## 2024-01-24 RX ADMIN — Medication 1 APPLICATION(S): at 23:44

## 2024-01-24 RX ADMIN — Medication 80 MILLIGRAM(S): at 05:43

## 2024-01-24 RX ADMIN — SODIUM CHLORIDE 80 MILLILITER(S): 9 INJECTION INTRAMUSCULAR; INTRAVENOUS; SUBCUTANEOUS at 03:01

## 2024-01-24 RX ADMIN — CHLORHEXIDINE GLUCONATE 1 APPLICATION(S): 213 SOLUTION TOPICAL at 18:39

## 2024-01-24 RX ADMIN — RIVAROXABAN 20 MILLIGRAM(S): KIT at 16:48

## 2024-01-24 RX ADMIN — LOSARTAN POTASSIUM 50 MILLIGRAM(S): 100 TABLET, FILM COATED ORAL at 05:44

## 2024-01-24 NOTE — PROGRESS NOTE ADULT - ASSESSMENT
83 y.o F w/ hx of A-fib on Xarelto s/p ablation one week ago prior to hospital admission came to the hospital for constipation. GI was following for constipation.     #) New onset constipation DD: ?immobility after the procedure (? vagal nerve injury) vs less likely 2/2 anesthesia from the ablation (unusual for the medications for long term effects vs r/o malignancy)  -used to have regular BM before ablation   -TSH normal, vitals normal hemodynamically stable   -initial CT showed  rectum measuring 7.5 cm in diameter with mild surrounding inflammatory changes, suspicious for developing stercoral proctitis. Large diffuse colonic stool burden.  -patient reports last colonoscopy 8 years ago with Dr. Hunt wn  -So far on enema had one BM yesterday with enema, had disimpaction 1/18   -Abdomen is soft on examination     Recs:   -patient having enormous BMs after finishing Golytely gallon   Diet as tolerated (high fiber diet)   encourage ambulation   Avoid anticholinergics opiates CCB which can dec motility of the colon   keep electrolytes within normal limits   if patient not relieved consider colorectal surgery eval for examination under anesthesia   Senna 2 tabs at bedtime, Miralax BID  xray KUB tomorrow   will benefit from colonoscopy OP follow up with Dr. Hunt

## 2024-01-24 NOTE — PROGRESS NOTE ADULT - SUBJECTIVE AND OBJECTIVE BOX
Progress note    INTERVAL HPI/OVERNIGHT EVENTS:    Patient seen and examined at bedside. She admits to having a large BM yesterday. No acute distress at this time.      REVIEW OF SYSTEMS:  All other 13 Review of systems were reviewed and are negative    FAMILY HISTORY:    T(C): 35.8 (01-24-24 @ 14:07), Max: 35.8 (01-24-24 @ 14:07)  HR: 112 (01-24-24 @ 14:07) (112 - 115)  BP: 119/59 (01-24-24 @ 14:07) (114/70 - 125/76)  RR: 18 (01-24-24 @ 14:07) (18 - 18)  SpO2: 97% (01-24-24 @ 14:07) (95% - 97%)  Wt(kg): --Vital Signs Last 24 Hrs  T(C): 35.8 (24 Jan 2024 14:07), Max: 35.8 (24 Jan 2024 14:07)  T(F): 96.4 (24 Jan 2024 14:07), Max: 96.4 (24 Jan 2024 14:07)  HR: 112 (24 Jan 2024 14:07) (112 - 115)  BP: 119/59 (24 Jan 2024 14:07) (114/70 - 125/76)  BP(mean): --  RR: 18 (24 Jan 2024 14:07) (18 - 18)  SpO2: 97% (24 Jan 2024 14:07) (95% - 97%)    Parameters below as of 24 Jan 2024 14:07  Patient On (Oxygen Delivery Method): room air      penicillin V potassium (Unknown)  naproxen (Unknown)  Cipro (Sedation/Somnol)  amoxicillin (Other (Mild to Mod))  sulfonamides (Unknown)  cephalexin (Unknown)      PHYSICAL EXAM:    GEN: No acute distress  LUNGS: Clear to auscultation bilaterally   HEART: S1/S2 present. irregularly irregular .   ABD: Soft, non-tender, non-distended. Bowel sounds present  EXT: NC/NC/NE/2+PP/DUDLEY  NEURO: AAOX3  SKIN: intact     Consultant(s) Notes Reviewed:  [x ] YES  [ ] NO  Care Discussed with Consultants/Other Providers [ x] YES  [ ] NO    LABS:      RADIOLOGY & ADDITIONAL TESTS:    Imaging Personally Reviewed:  [ ] YES  [ ] NO  acetaminophen     Tablet .. 650 milliGRAM(s) Oral every 6 hours PRN  chlorhexidine 4% Liquid 1 Application(s) Topical <User Schedule>  influenza  Vaccine (HIGH DOSE) 0.7 milliLiter(s) IntraMuscular once  losartan 50 milliGRAM(s) Oral daily  magnesium hydroxide Suspension 30 milliLiter(s) Oral daily PRN  melatonin 3 milliGRAM(s) Oral at bedtime PRN  pantoprazole    Tablet 40 milliGRAM(s) Oral before breakfast  polyethylene glycol 3350 17 Gram(s) Oral daily  rivaroxaban 20 milliGRAM(s) Oral with dinner  senna 2 Tablet(s) Oral at bedtime  sodium chloride 0.65% Nasal 1 Spray(s) Both Nostrils every 8 hours PRN  sotalol. 80 milliGRAM(s) Oral every 12 hours  vitamin A &amp; D Ointment 1 Application(s) Topical every 6 hours      HEALTH ISSUES - PROBLEM Dx:    83 yr old female with hx of A-fib on Xarelto s/p ablation one week ago patient reports she was laying in bed a lot since. Patient reports severe constipation started 1/9/24.    #Fecal impaction with stercoral colitis   - disimpacted in the ED and by GI on 1/18, S/p large BM 1/24  - K corrected, Ca supplementation given  - added po laxatives  - encouraged PT/OOB  - resume regular diet  - PT recommended RW and rehab vs home PT  -KUB in am    # Acute on chronic Afb   - pt failed ablation performed on 1/10/23  - s/p multiple DCCV failed, s/p cryoablation on 1/9/24 - back in afib now   - Was hypotensive and thought to be related to hypotensive, but still unctrolled s/p IVF  - c/w Xarelto  -Last TTE from 2022 shows normal LVEF  -Check TTE, to confirm normal LVEF  - sotalol 80mg bid, add cardizem,  -Repeat EKG in am    # hx of temporal arteritis   - normally on actemera and prolia at home  - recommended that she f/u with her rheumatologist on whether to continue both medications as constipation and hypocalcemia are SE of both  - please make sure to put this info in her discharge paperwork as per pt request  - calcium replenished    # Urinary Retention due to fecal impaction   - TOV today    # DVT Ppx   - on Xarelto    Full Code    Healthcare maintanence on discharge : Colonoscopy OP follow up with Dr. Hunt        Progress note    INTERVAL HPI/OVERNIGHT EVENTS:    Patient seen and examined at bedside. She admits to having a large BM yesterday. No acute distress at this time.      REVIEW OF SYSTEMS:  All other 13 Review of systems were reviewed and are negative    FAMILY HISTORY:    T(C): 35.8 (01-24-24 @ 14:07), Max: 35.8 (01-24-24 @ 14:07)  HR: 112 (01-24-24 @ 14:07) (112 - 115)  BP: 119/59 (01-24-24 @ 14:07) (114/70 - 125/76)  RR: 18 (01-24-24 @ 14:07) (18 - 18)  SpO2: 97% (01-24-24 @ 14:07) (95% - 97%)  Wt(kg): --Vital Signs Last 24 Hrs  T(C): 35.8 (24 Jan 2024 14:07), Max: 35.8 (24 Jan 2024 14:07)  T(F): 96.4 (24 Jan 2024 14:07), Max: 96.4 (24 Jan 2024 14:07)  HR: 112 (24 Jan 2024 14:07) (112 - 115)  BP: 119/59 (24 Jan 2024 14:07) (114/70 - 125/76)  BP(mean): --  RR: 18 (24 Jan 2024 14:07) (18 - 18)  SpO2: 97% (24 Jan 2024 14:07) (95% - 97%)    Parameters below as of 24 Jan 2024 14:07  Patient On (Oxygen Delivery Method): room air      penicillin V potassium (Unknown)  naproxen (Unknown)  Cipro (Sedation/Somnol)  amoxicillin (Other (Mild to Mod))  sulfonamides (Unknown)  cephalexin (Unknown)      PHYSICAL EXAM:    GEN: No acute distress  LUNGS: Clear to auscultation bilaterally   HEART: S1/S2 present. irregularly irregular .   ABD: Soft, non-tender, non-distended. Bowel sounds present  EXT: NC/NC/NE/2+PP/DUDLEY  NEURO: AAOX3  SKIN: intact     Consultant(s) Notes Reviewed:  [x ] YES  [ ] NO  Care Discussed with Consultants/Other Providers [ x] YES  [ ] NO    LABS:      RADIOLOGY & ADDITIONAL TESTS:    Imaging Personally Reviewed:  [ ] YES  [ ] NO  acetaminophen     Tablet .. 650 milliGRAM(s) Oral every 6 hours PRN  chlorhexidine 4% Liquid 1 Application(s) Topical <User Schedule>  influenza  Vaccine (HIGH DOSE) 0.7 milliLiter(s) IntraMuscular once  losartan 50 milliGRAM(s) Oral daily  magnesium hydroxide Suspension 30 milliLiter(s) Oral daily PRN  melatonin 3 milliGRAM(s) Oral at bedtime PRN  pantoprazole    Tablet 40 milliGRAM(s) Oral before breakfast  polyethylene glycol 3350 17 Gram(s) Oral daily  rivaroxaban 20 milliGRAM(s) Oral with dinner  senna 2 Tablet(s) Oral at bedtime  sodium chloride 0.65% Nasal 1 Spray(s) Both Nostrils every 8 hours PRN  sotalol. 80 milliGRAM(s) Oral every 12 hours  vitamin A &amp; D Ointment 1 Application(s) Topical every 6 hours      HEALTH ISSUES - PROBLEM Dx:    83 yr old female with hx of A-fib on Xarelto s/p ablation one week ago patient reports she was laying in bed a lot since. Patient reports severe constipation started 1/9/24.    #Fecal impaction with stercoral colitis   - disimpacted in the ED and by GI on 1/18, S/p large BM 1/24  - K corrected, Ca supplementation given  - added po laxatives  - encouraged PT/OOB  - resume regular diet  - PT recommended RW and rehab vs home PT  -KUB in am    # Acute on chronic Afb   - pt failed ablation performed on 1/10/23  - s/p multiple DCCV failed, s/p cryoablation on 1/9/24 - back in afib now   - Was hypotensive and thought to be related to hypotensive, but still unctrolled s/p IVF  - c/w Xarelto  -Last TTE from 2022 shows normal LVEF  -Check TTE, to confirm normal LVEF, CXRin ma  - sotalol 80mg bid, add cardizem,  -Repeat EKG in am    # hx of temporal arteritis   - normally on actemera and prolia at home  - recommended that she f/u with her rheumatologist on whether to continue both medications as constipation and hypocalcemia are SE of both  - please make sure to put this info in her discharge paperwork as per pt request  - calcium replenished    # Urinary Retention due to fecal impaction   - TOV today    # DVT Ppx   - on Xarelto    Full Code    Healthcare maintanence on discharge : Colonoscopy OP follow up with Dr. Hunt

## 2024-01-24 NOTE — PROGRESS NOTE ADULT - SUBJECTIVE AND OBJECTIVE BOX
83yFemale  Being followed for fecal impaction  Interval history: Patient denies nausea, vomiting, hematemesis, melena, blood in stool, diarrhea, constipation, abdominal pain. Patient having enormous bms after drinking laxatives. Patient feels 100% better.      PAST MEDICAL & SURGICAL HISTORY:   Atrial fibrillation      HTN (hypertension)      History of temporal arteritis      History of temporal artery biopsy      Osteoporosis      H/O temporal arteritis      S/P cataract surgery                Social History: No smoking. No alcohol. No illegal drug use.          MEDICATIONS  (STANDING):  chlorhexidine 4% Liquid 1 Application(s) Topical <User Schedule>  influenza  Vaccine (HIGH DOSE) 0.7 milliLiter(s) IntraMuscular once  losartan 50 milliGRAM(s) Oral daily  pantoprazole    Tablet 40 milliGRAM(s) Oral before breakfast  polyethylene glycol 3350 17 Gram(s) Oral daily  rivaroxaban 20 milliGRAM(s) Oral with dinner  senna 2 Tablet(s) Oral at bedtime  sotalol. 80 milliGRAM(s) Oral every 12 hours  vitamin A &amp; D Ointment 1 Application(s) Topical every 6 hours    MEDICATIONS  (PRN):  acetaminophen     Tablet .. 650 milliGRAM(s) Oral every 6 hours PRN Temp greater or equal to 38C (100.4F), Mild Pain (1 - 3)  magnesium hydroxide Suspension 30 milliLiter(s) Oral daily PRN Constipation  melatonin 3 milliGRAM(s) Oral at bedtime PRN Insomnia  sodium chloride 0.65% Nasal 1 Spray(s) Both Nostrils every 8 hours PRN Nasal Congestion      Allergies:   penicillin V potassium (Unknown)  naproxen (Unknown)  Cipro (Sedation/Somnol)  amoxicillin (Other (Mild to Mod))  sulfonamides (Unknown)  cephalexin (Unknown)  Intolerances          REVIEW OF SYSTEMS:  General:  No weight loss, fevers, or chills.  Eyes:  No reported pain or visual changes  ENT:  No sore throat or runny nose.  NECK: No stiffness   CV:  No chest pain or palpitations.  Resp:  No shortness of breath, cough  GI:  No abdominal pain, nausea, vomiting, dysphagia, diarrhea or constipation. No rectal bleeding, melena, or hematemesis.  Muscle:  No aches or weakness  Neuro:  No tingling, numbness           VITAL SIGNS:   T(F): 96.4 (01-24-24 @ 14:07), Max: 96.4 (01-24-24 @ 14:07)  HR: 112 (01-24-24 @ 14:07) (112 - 134)  BP: 119/59 (01-24-24 @ 14:07) (114/70 - 125/76)  RR: 18 (01-24-24 @ 14:07) (18 - 18)  SpO2: 97% (01-24-24 @ 14:07) (95% - 97%)    PHYSICAL EXAM:  GENERAL: AAOx3, no acute distress.  HEAD:  Atraumatic, Normocephalic  EYES: conjunctiva and sclera clear  NECK: Supple, no JVD or thyromegaly  CHEST/LUNG: Clear to auscultation bilaterally; No wheeze, rhonchi, or rales  HEART: Regular rate and rhythm; normal S1, S2, No murmurs.  ABDOMEN: Soft, nontender, nondistended; Bowel sounds present  NEUROLOGY: No asterixis or tremor.   SKIN: Intact, no jaundice            LABS:                        11.5   4.94  )-----------( 242      ( 24 Jan 2024 08:14 )             33.8     01-24    136  |  99  |  9<L>  ----------------------------<  118<H>  5.2<H>   |  26  |  0.5<L>    Ca    8.1<L>      24 Jan 2024 08:14    TPro  5.5<L>  /  Alb  3.7  /  TBili  0.6  /  DBili  x   /  AST  21  /  ALT  22  /  AlkPhos  40  01-24    LIVER FUNCTIONS - ( 24 Jan 2024 08:14 )  Alb: 3.7 g/dL / Pro: 5.5 g/dL / ALK PHOS: 40 U/L / ALT: 22 U/L / AST: 21 U/L / GGT: x               IMAGING:    < from: CT Abdomen and Pelvis w/ IV Cont (01.17.24 @ 12:30) >    ACC: [id number] EXAM:  CT ABDOMEN AND PELVIS IC   ORDERED BY: ISABEL CLEVELAND     PROCEDURE DATE:  01/17/2024          INTERPRETATION:  CLINICAL STATEMENT: Constipation    TECHNIQUE: Contiguous axial CT images were obtained from the lower chest   to the pubic symphysis following administration of intravenous contrast.    90 cc administered of Omnipaque 350 (10 cc discarded).  Oral contrast was   not administered.  Reformatted images in the coronal and sagittal planes   were acquired.    COMPARISON CT: None.    OTHER STUDIES USED FOR CORRELATION: None.      FINDINGS:    LOWER CHEST: Right middle lobe tree-in-bud and nodular opacities with   areas of bronchiectasis. Additional scattered nodular opacities noted in   the left lower lobe.    HEPATOBILIARY: Unremarkable.    SPLEEN: Unremarkable.    PANCREAS: Unremarkable.    ADRENAL GLANDS: Unremarkable.    KIDNEYS: Symmetric renal enhancement bilaterally. No hydronephrosis.    ABDOMINOPELVIC NODES: No lymphadenopathy.    PELVIC ORGANS: Unremarkable.    PERITONEUM/MESENTERY/BOWEL: Large diffuse colonic stool burden. Stool   filled distended rectum measures 7.5 cm in diameter with mild hazy   adjacent edema/fat stranding. No bowel obstruction, ascites or   pneumoperitoneum. Normal appendix.    BONES/SOFT TISSUES: Bilateral L5 pars defects with grade 2   anterolisthesis of L5 on S1.    OTHER: Scattered atherosclerotic vascular calcifications.      IMPRESSION:  1.  Stool filled distended rectum measuring 7.5 cm in diameter with mild   surrounding inflammatory changes, suspicious for developing stercoral   proctitis. Large diffuse colonic stool burden.  2.  Evidence of bibasilar infectious/inflammatory small airways disease.    --- End of Report ---            LALA RAMIREZ MD; Attending Radiologist  This document has been electronically signed. Jan 17 2024  1:36PM    < end of copied text >

## 2024-01-25 DIAGNOSIS — I10 ESSENTIAL (PRIMARY) HYPERTENSION: ICD-10-CM

## 2024-01-25 DIAGNOSIS — Z88.0 ALLERGY STATUS TO PENICILLIN: ICD-10-CM

## 2024-01-25 DIAGNOSIS — K00.0 ANODONTIA: ICD-10-CM

## 2024-01-25 DIAGNOSIS — I27.20 PULMONARY HYPERTENSION, UNSPECIFIED: ICD-10-CM

## 2024-01-25 DIAGNOSIS — Z88.1 ALLERGY STATUS TO OTHER ANTIBIOTIC AGENTS STATUS: ICD-10-CM

## 2024-01-25 DIAGNOSIS — Z87.891 PERSONAL HISTORY OF NICOTINE DEPENDENCE: ICD-10-CM

## 2024-01-25 DIAGNOSIS — I34.0 NONRHEUMATIC MITRAL (VALVE) INSUFFICIENCY: ICD-10-CM

## 2024-01-25 DIAGNOSIS — Z88.6 ALLERGY STATUS TO ANALGESIC AGENT: ICD-10-CM

## 2024-01-25 DIAGNOSIS — Z88.2 ALLERGY STATUS TO SULFONAMIDES: ICD-10-CM

## 2024-01-25 DIAGNOSIS — Z79.01 LONG TERM (CURRENT) USE OF ANTICOAGULANTS: ICD-10-CM

## 2024-01-25 DIAGNOSIS — I48.0 PAROXYSMAL ATRIAL FIBRILLATION: ICD-10-CM

## 2024-01-25 DIAGNOSIS — M81.0 AGE-RELATED OSTEOPOROSIS WITHOUT CURRENT PATHOLOGICAL FRACTURE: ICD-10-CM

## 2024-01-25 DIAGNOSIS — Z98.49 CATARACT EXTRACTION STATUS, UNSPECIFIED EYE: ICD-10-CM

## 2024-01-25 DIAGNOSIS — I47.19 OTHER SUPRAVENTRICULAR TACHYCARDIA: ICD-10-CM

## 2024-01-25 LAB
ANION GAP SERPL CALC-SCNC: 7 MMOL/L — SIGNIFICANT CHANGE UP (ref 7–14)
BUN SERPL-MCNC: 11 MG/DL — SIGNIFICANT CHANGE UP (ref 10–20)
CALCIUM SERPL-MCNC: 8.5 MG/DL — SIGNIFICANT CHANGE UP (ref 8.4–10.5)
CHLORIDE SERPL-SCNC: 98 MMOL/L — SIGNIFICANT CHANGE UP (ref 98–110)
CO2 SERPL-SCNC: 29 MMOL/L — SIGNIFICANT CHANGE UP (ref 17–32)
CREAT SERPL-MCNC: 0.5 MG/DL — LOW (ref 0.7–1.5)
EGFR: 94 ML/MIN/1.73M2 — SIGNIFICANT CHANGE UP
GLUCOSE SERPL-MCNC: 97 MG/DL — SIGNIFICANT CHANGE UP (ref 70–99)
MAGNESIUM SERPL-MCNC: 2.1 MG/DL — SIGNIFICANT CHANGE UP (ref 1.8–2.4)
POTASSIUM SERPL-MCNC: 5.1 MMOL/L — HIGH (ref 3.5–5)
POTASSIUM SERPL-SCNC: 5.1 MMOL/L — HIGH (ref 3.5–5)
SODIUM SERPL-SCNC: 134 MMOL/L — LOW (ref 135–146)

## 2024-01-25 PROCEDURE — 93306 TTE W/DOPPLER COMPLETE: CPT | Mod: 26

## 2024-01-25 PROCEDURE — 99233 SBSQ HOSP IP/OBS HIGH 50: CPT

## 2024-01-25 PROCEDURE — 74018 RADEX ABDOMEN 1 VIEW: CPT | Mod: 26

## 2024-01-25 PROCEDURE — 71045 X-RAY EXAM CHEST 1 VIEW: CPT | Mod: 26

## 2024-01-25 RX ORDER — DILTIAZEM HCL 120 MG
60 CAPSULE, EXT RELEASE 24 HR ORAL EVERY 8 HOURS
Refills: 0 | Status: DISCONTINUED | OUTPATIENT
Start: 2024-01-25 | End: 2024-01-25

## 2024-01-25 RX ORDER — DILTIAZEM HCL 120 MG
60 CAPSULE, EXT RELEASE 24 HR ORAL EVERY 6 HOURS
Refills: 0 | Status: DISCONTINUED | OUTPATIENT
Start: 2024-01-25 | End: 2024-01-26

## 2024-01-25 RX ADMIN — Medication 60 MILLIGRAM(S): at 23:37

## 2024-01-25 RX ADMIN — Medication 80 MILLIGRAM(S): at 06:47

## 2024-01-25 RX ADMIN — Medication 30 MILLIGRAM(S): at 06:49

## 2024-01-25 RX ADMIN — Medication 1 APPLICATION(S): at 06:53

## 2024-01-25 RX ADMIN — LOSARTAN POTASSIUM 50 MILLIGRAM(S): 100 TABLET, FILM COATED ORAL at 06:50

## 2024-01-25 RX ADMIN — RIVAROXABAN 20 MILLIGRAM(S): KIT at 18:15

## 2024-01-25 RX ADMIN — Medication 30 MILLIGRAM(S): at 11:16

## 2024-01-25 RX ADMIN — Medication 80 MILLIGRAM(S): at 18:14

## 2024-01-25 RX ADMIN — PANTOPRAZOLE SODIUM 40 MILLIGRAM(S): 20 TABLET, DELAYED RELEASE ORAL at 06:49

## 2024-01-25 RX ADMIN — Medication 1 APPLICATION(S): at 18:14

## 2024-01-25 RX ADMIN — SENNA PLUS 2 TABLET(S): 8.6 TABLET ORAL at 21:22

## 2024-01-25 NOTE — CONSULT NOTE ADULT - SUBJECTIVE AND OBJECTIVE BOX
HPI:  Patient is an 84yo F w. pmhx of AFib on Xarelto s/p ablation one week ago, pHTN, Temporal arteritis, Osteoporosis presenting with complaints of constipation since last week Tuesday 01/09. Reports had ablation w/ CT attending Dr. Lester on week ago, since than has been extremely constipated with very little appetite. Reports taking a suppository on Sunday with very small amount of watery brown stool. Endorses generalized cramping lower abdominal pain. Denies fever, chills, nausea, vomiting, chest pain, palpitation, sob, or any urinary sxs     (17 Jan 2024 15:12)        HPI-Cardiology   Pt with the above Hx and HPI evaluated at bedside. Pt presented for with complaints of constipation since last week Tuesday 01/09. Reports had ablation w/ CT attending Dr. Lester on week ago, since than has been extremely constipated with very little appetite. Cardio consulted for rate still uncontrolled. Pt denies CP, SOB, palpitations, dizziness/lightheadedness Radiology tests and hospital records, were reviewed, as well as previous notes on this patient.        PAST MEDICAL & SURGICAL HISTORY  Atrial fibrillation    HTN (hypertension)    History of temporal arteritis    History of temporal artery biopsy    Osteoporosis    H/O temporal arteritis    S/P cataract surgery        ALLERGIES:  penicillin V potassium (Unknown)  naproxen (Unknown)  Cipro (Sedation/Somnol)  amoxicillin (Other (Mild to Mod))  sulfonamides (Unknown)  cephalexin (Unknown)      MEDICATIONS:  MEDICATIONS  (STANDING):  chlorhexidine 2% Cloths 1 Application(s) Topical <User Schedule>  diltiazem    Tablet 60 milliGRAM(s) Oral every 8 hours  influenza  Vaccine (HIGH DOSE) 0.7 milliLiter(s) IntraMuscular once  losartan 50 milliGRAM(s) Oral daily  pantoprazole    Tablet 40 milliGRAM(s) Oral before breakfast  polyethylene glycol 3350 17 Gram(s) Oral daily  rivaroxaban 20 milliGRAM(s) Oral with dinner  senna 2 Tablet(s) Oral at bedtime  sotalol. 80 milliGRAM(s) Oral every 12 hours  vitamin A &amp; D Ointment 1 Application(s) Topical every 6 hours    MEDICATIONS  (PRN):  acetaminophen     Tablet .. 650 milliGRAM(s) Oral every 6 hours PRN Temp greater or equal to 38C (100.4F), Mild Pain (1 - 3)  magnesium hydroxide Suspension 30 milliLiter(s) Oral daily PRN Constipation  melatonin 3 milliGRAM(s) Oral at bedtime PRN Insomnia  sodium chloride 0.65% Nasal 1 Spray(s) Both Nostrils every 8 hours PRN Nasal Congestion      HOME MEDICATIONS:  Home Medications:  Actemra 162 mg/0.9 mL subcutaneous solution: subcutaneous every 3 weeks (10 Lucien 2024 07:38)  hydroCHLOROthiazide 12.5 mg oral tablet: 1 tablet orally once a day (11 Jan 2024 11:18)  hydrocortisone 2.5% topical cream: Apply topically to affected area as needed for  itching (10 Lucien 2024 07:38)  olmesartan 20 mg oral tablet: 1 orally (10 Lucien 2024 07:38)  Prolia 60 mg/mL subcutaneous solution: subcutaneous every 6 months (10 Lucien 2024 07:38)  sotalol 80 mg oral tablet: 1 tab(s) orally 2 times a day (10 Lucien 2024 07:38)  Xarelto 20 mg oral tablet: 1 tab(s) orally once a day (in the evening) (11 Jan 2024 11:20)      VITALS:   T(F): 96.6 (01-25 @ 14:43), Max: 97.2 (01-24 @ 20:46)  HR: 110 (01-25 @ 14:43) (95 - 153)  BP: 119/70 (01-25 @ 14:43) (93/64 - 136/88)  BP(mean): --  RR: 18 (01-25 @ 14:43) (17 - 18)  SpO2: 95% (01-25 @ 14:43) (94% - 99%)    I&O's Summary    24 Jan 2024 07:01  -  25 Jan 2024 07:00  --------------------------------------------------------  IN: 0 mL / OUT: 2150 mL / NET: -2150 mL        REVIEW OF SYSTEMS:  See HPI      PHYSICAL EXAM:  NEURO: patient is awake , alert and oriented  GEN: Not in acute distress  NECK: no thyroid enlargement, no JVD  LUNGS: Inspiratory Wheezing on auscultation bilaterally   CARDIOVASCULAR: S1/S2 present, irregular rate and rhythm, no murmurs or rubs, no carotid bruits,  + PP bilaterally  ABD: Soft, non-tender, non-distended, +BS  EXT: No KRISTEN  SKIN: Intact        LABS:                        11.5   4.94  )-----------( 242      ( 24 Jan 2024 08:14 )             33.8     01-25    134<L>  |  98  |  11  ----------------------------<  97  5.1<H>   |  29  |  0.5<L>    Ca    8.5      25 Jan 2024 07:06  Mg     2.1     01-25    TPro  5.5<L>  /  Alb  3.7  /  TBili  0.6  /  DBili  x   /  AST  21  /  ALT  22  /  AlkPhos  40  01-24            RADIOLOGY:  -CXR:  < from: Xray Chest 1 View- PORTABLE-Routine (Xray Chest 1 View- PORTABLE-Routine in AM.) (01.25.24 @ 02:36) >    Impression:    Stable bibasilar opacity/effusion.    --- End of Report ---      < end of copied text >              < from: TTE Echo Complete w/o Contrast w/ Doppler (01.25.24 @ 09:04) >    Summary:   1. LV Ejection Fraction by Dixon's Method with a biplane EF of 57 %.   2. Mildly enlarged left atrium.   3. Mildly enlarged right atrium.   4. Mild mitral annular calcification.   5. Moderate to severe mitral valve regurgitation.   6. Mild-moderate tricuspid regurgitation.   7. Sclerotic aortic valve with normal opening.      < end of copied text >        ECG:  < from: 12 Lead ECG (01.24.24 @ 18:13) >  Atrial fibrillation with rapid ventricular response  Low voltage QRS  Septal infarct , age undetermined  Abnormal ECG    Confirmed by CARMEN BUNCH MD ([id number]) on 1/25/2024 6:57:09 AM    < end of copied text >

## 2024-01-25 NOTE — PROGRESS NOTE ADULT - SUBJECTIVE AND OBJECTIVE BOX
Gastroenterology progress note:     Patient is a 83y old  Female who presents with a chief complaint of constipation (23 Jan 2024 15:35)       Admitted on: 01-17-24    We are following the patient for: constipation       Interval History:    No acute events overnight.    having multiple bms       PAST MEDICAL & SURGICAL HISTORY:  Atrial fibrillation      HTN (hypertension)      History of temporal arteritis      History of temporal artery biopsy      Osteoporosis      H/O temporal arteritis      S/P cataract surgery          MEDICATIONS  (STANDING):  chlorhexidine 2% Cloths 1 Application(s) Topical <User Schedule>  diltiazem    Tablet 30 milliGRAM(s) Oral every 6 hours  influenza  Vaccine (HIGH DOSE) 0.7 milliLiter(s) IntraMuscular once  losartan 50 milliGRAM(s) Oral daily  pantoprazole    Tablet 40 milliGRAM(s) Oral before breakfast  polyethylene glycol 3350 17 Gram(s) Oral daily  rivaroxaban 20 milliGRAM(s) Oral with dinner  senna 2 Tablet(s) Oral at bedtime  sotalol. 80 milliGRAM(s) Oral every 12 hours  vitamin A &amp; D Ointment 1 Application(s) Topical every 6 hours    MEDICATIONS  (PRN):  acetaminophen     Tablet .. 650 milliGRAM(s) Oral every 6 hours PRN Temp greater or equal to 38C (100.4F), Mild Pain (1 - 3)  magnesium hydroxide Suspension 30 milliLiter(s) Oral daily PRN Constipation  melatonin 3 milliGRAM(s) Oral at bedtime PRN Insomnia  sodium chloride 0.65% Nasal 1 Spray(s) Both Nostrils every 8 hours PRN Nasal Congestion      Allergies  penicillin V potassium (Unknown)  naproxen (Unknown)  Cipro (Sedation/Somnol)  amoxicillin (Other (Mild to Mod))  sulfonamides (Unknown)  cephalexin (Unknown)      Review of Systems:   Cardiovascular:  No Chest Pain, No Palpitations  Respiratory:  No Cough, No Dyspnea  Gastrointestinal:  As described in HPI  Skin:  No Skin Lesions, No Jaundice  Neuro:  No Syncope, No Dizziness    Physical Examination:  T(C): 35.9 (01-25-24 @ 14:43), Max: 36.2 (01-24-24 @ 20:46)  HR: 110 (01-25-24 @ 14:43) (95 - 119)  BP: 119/70 (01-25-24 @ 14:43) (104/63 - 124/72)  RR: 18 (01-25-24 @ 14:43) (18 - 18)  SpO2: 95% (01-25-24 @ 14:43) (95% - 97%)      01-24-24 @ 07:01  -  01-25-24 @ 07:00  --------------------------------------------------------  IN: 0 mL / OUT: 2150 mL / NET: -2150 mL        GENERAL: AAOx3, no acute distress.  HEAD:  Atraumatic, Normocephalic  EYES: conjunctiva and sclera clear  NECK: Supple, no JVD or thyromegaly  CHEST/LUNG: Clear to auscultation bilaterally; No wheeze, rhonchi, or rales  HEART: Regular rate and rhythm; normal S1, S2, No murmurs.  ABDOMEN: Soft, nontender, nondistended; Bowel sounds present  NEUROLOGY: No asterixis or tremor.   SKIN: Intact, no jaundice     Data:                        11.5   4.94  )-----------( 242      ( 24 Jan 2024 08:14 )             33.8     Hgb trend:  11.5  01-24-24 @ 08:14  11.7  01-23-24 @ 06:48        01-25    134<L>  |  98  |  11  ----------------------------<  97  5.1<H>   |  29  |  0.5<L>    Ca    8.5      25 Jan 2024 07:06  Mg     2.1     01-25    TPro  5.5<L>  /  Alb  3.7  /  TBili  0.6  /  DBili  x   /  AST  21  /  ALT  22  /  AlkPhos  40  01-24    Liver panel trend:  TBili 0.6   /   AST 21   /   ALT 22   /   AlkP 40   /   Tptn 5.5   /   Alb 3.7    /   DBili --      01-24  TBili 0.5   /   AST 16   /   ALT 15   /   AlkP 40   /   Tptn 5.3   /   Alb 3.4    /   DBili --      01-23  TBili 0.4   /   AST 20   /   ALT 20   /   AlkP 40   /   Tptn 6.1   /   Alb 4.0    /   DBili --      01-17             Radiology:

## 2024-01-25 NOTE — PROGRESS NOTE ADULT - ASSESSMENT
83 y.o F w/ hx of A-fib on Xarelto s/p ablation one week ago prior to hospital admission came to the hospital for constipation. GI was following for constipation.     # Constipation DD: ?immobility after the procedure  vs less likely 2/2 anesthesia from the ablation (unusual for the medications for long term effects vs r/o malignancy)  -used to have regular BM before ablation   -TSH normal, vitals normal hemodynamically stable   -initial CT showed  rectum measuring 7.5 cm in diameter with mild surrounding inflammatory changes, suspicious for developing stercoral proctitis. Large diffuse colonic stool burden.  -patient reports last colonoscopy 8 years ago with Dr. Hunt wnl  - had disimpaction 1/18   -Abdomen is soft on examination   - s/p golytely and having multiple bms   - KUB reviewed     Recs:   Diet as tolerated (high fiber diet)   encourage ambulation   Avoid anticholinergics opiates CCB which can dec motility of the colon   keep electrolytes within normal limits   Senna 2 tabs at bedtime, Miralax BID  will benefit from colonoscopy OP follow up with Dr. Hunt

## 2024-01-25 NOTE — CONSULT NOTE ADULT - ASSESSMENT
82yo F w. pmhx of AFib on Xarelto s/p ablation one week ago, pHTN, Temporal arteritis, Osteoporosis presenting with complaints of constipation x1 week. Cardio consulted for AFib w/RVR      Impression:  #Long standing Afib  #HTN      Pt currently asymptomatic and HD stable  ECG: Afib, no acute ischemic changes   On tele appears in Afib, not well controlled with HR fluctuating 's  ECHO: EF 57%, Mod-Severe MR  Cxr: Stable bibasilar opacity/effusion  Euvolemic on exam  Pt states that she does want to take Amiodarone or Metoprolol for rate control as they did not work for her AFib  Also states that she does not wish to get another ablation or cardioversion as they didn't work for her      Plan:  Increase Cardizem to 60mg PO Q6hrs for better rate control  Cont w/ Sotalol 80mg Q12  Consider Digoxin if unable to control rate  Cont w/ Xarelto  Monitor cecelia

## 2024-01-25 NOTE — PROGRESS NOTE ADULT - SUBJECTIVE AND OBJECTIVE BOX
Progress note    INTERVAL HPI/OVERNIGHT EVENTS:    Patient seen and examined at bedside. No acute distress. She has had another BM yesterday. Telemetry monitor shows HR still elevated.      REVIEW OF SYSTEMS:  All other 13 Review of systems were reviewed and are negative    FAMILY HISTORY:    T(C): 35.9 (01-25-24 @ 14:43), Max: 36.2 (01-24-24 @ 20:46)  HR: 110 (01-25-24 @ 14:43) (95 - 119)  BP: 119/70 (01-25-24 @ 14:43) (104/63 - 124/72)  RR: 18 (01-25-24 @ 14:43) (18 - 18)  SpO2: 95% (01-25-24 @ 14:43) (95% - 97%)  Wt(kg): --Vital Signs Last 24 Hrs  T(C): 35.9 (25 Jan 2024 14:43), Max: 36.2 (24 Jan 2024 20:46)  T(F): 96.6 (25 Jan 2024 14:43), Max: 97.2 (24 Jan 2024 20:46)  HR: 110 (25 Jan 2024 14:43) (95 - 119)  BP: 119/70 (25 Jan 2024 14:43) (104/63 - 124/72)  BP(mean): --  RR: 18 (25 Jan 2024 14:43) (18 - 18)  SpO2: 95% (25 Jan 2024 14:43) (95% - 97%)    Parameters below as of 25 Jan 2024 14:43  Patient On (Oxygen Delivery Method): room air      penicillin V potassium (Unknown)  naproxen (Unknown)  Cipro (Sedation/Somnol)  amoxicillin (Other (Mild to Mod))  sulfonamides (Unknown)  cephalexin (Unknown)      PHYSICAL EXAM:    GEN: No acute distress  LUNGS: Clear to auscultation bilaterally   HEART: S1/S2 present. irregularly irregular .   ABD: Soft, non-tender, non-distended. Bowel sounds present  EXT: NC/NC/NE/2+PP/DUDLEY  NEURO: AAOX3  SKIN: intact     Consultant(s) Notes Reviewed:  [x ] YES  [ ] NO  Care Discussed with Consultants/Other Providers [ x] YES  [ ] NO    LABS:      RADIOLOGY & ADDITIONAL TESTS:    Imaging Personally Reviewed:  [ ] YES  [ ] NO  acetaminophen     Tablet .. 650 milliGRAM(s) Oral every 6 hours PRN  chlorhexidine 2% Cloths 1 Application(s) Topical <User Schedule>  diltiazem    Tablet 30 milliGRAM(s) Oral every 6 hours  influenza  Vaccine (HIGH DOSE) 0.7 milliLiter(s) IntraMuscular once  losartan 50 milliGRAM(s) Oral daily  magnesium hydroxide Suspension 30 milliLiter(s) Oral daily PRN  melatonin 3 milliGRAM(s) Oral at bedtime PRN  pantoprazole    Tablet 40 milliGRAM(s) Oral before breakfast  polyethylene glycol 3350 17 Gram(s) Oral daily  rivaroxaban 20 milliGRAM(s) Oral with dinner  senna 2 Tablet(s) Oral at bedtime  sodium chloride 0.65% Nasal 1 Spray(s) Both Nostrils every 8 hours PRN  sotalol. 80 milliGRAM(s) Oral every 12 hours  vitamin A &amp; D Ointment 1 Application(s) Topical every 6 hours      HEALTH ISSUES - PROBLEM Dx:    83 yr old female with hx of A-fib on Xarelto s/p ablation one week ago patient reports she was laying in bed a lot since. Patient reports severe constipation started 1/9/24.    #Fecal impaction with stercoral colitis   - disimpacted in the ED and by GI on 1/18, S/p large BM 1/24  - K corrected, Ca supplementation given  - added po laxatives  - encouraged PT/OOB  - resume regular diet  - PT recommended RW and rehab vs home PT  - KUB with moderate stool burden    # Acute on chronic Afb   - pt failed ablation performed on 1/10/23  - s/p multiple DCCV failed, s/p cryoablation on 1/9/24 - back in afib now   - Was hypotensive and thought to be related to hypotensive, but still unctrolled s/p IVF  - c/w Xarelto  -Last TTE from 2022 shows normal LVEF  -TTE - LVEF 57%, severe MVR, Mod TR  - sotalol 80mg bid, Increase cardizem to 60mg q8hrs  -Consult cardiology pending  -Repeat EKG in am    # hx of temporal arteritis   - normally on actemera and prolia at home  - recommended that she f/u with her rheumatologist on whether to continue both medications as constipation and hypocalcemia are SE of both  - please make sure to put this info in her discharge paperwork as per pt request  - calcium replenished    # Urinary Retention due to fecal impaction   - TOV today    # DVT Ppx   - on Xarelto    Full Code    Healthcare maintanence on discharge : Colonoscopy OP follow up with Dr. Hunt. Needs referral to structural heart dz on discharge

## 2024-01-25 NOTE — CONSULT NOTE ADULT - NS ATTEND AMEND GEN_ALL_CORE FT
Fecal impaction; enemas Q 2-4 hours. serial abd exams. we will follow. rest of recs per above note.    Time-based billing (NON-critical care).   55 minutes spent on total encounter; more than 50% of the visit was spent counseling and / or coordinating care by the attending physician.  The necessity of the time spent during the encounter on this date of service was due to: Coordination of care.
84yo F w. pmhx of AFib on Xarelto s/p ablation one week ago, pHTN, Temporal arteritis, Osteoporosis presenting with complaints of constipation x1 week. Cardio consulted for AFib w/RVR. Patient now does not want ablation or cardioversion again. Will rate coctrol . Today rate good on cardizem and sotalol. Can try in am cardizem cd 240. Ambulate with assistance. Complains sob. Check pulse oximeter ambulating. May have slight volume overload. and give one dose lasi 20 mg iv. If HR a issue can add digoxin

## 2024-01-25 NOTE — CHART NOTE - NSCHARTNOTEFT_GEN_A_CORE
The patient is able to safely use the walker. The patient's mobility deficit can be sufficiently resolved with the use of a walker. The patient is confined to one level of the home environment and there is no toilet on that level.

## 2024-01-25 NOTE — PROGRESS NOTE ADULT - ATTENDING COMMENTS
Pt feeling better, having multiple bms. Imaging reviewed. Continue bowel regimen, monitor bms, and follow up KUBs.

## 2024-01-26 LAB
ANION GAP SERPL CALC-SCNC: 9 MMOL/L — SIGNIFICANT CHANGE UP (ref 7–14)
BUN SERPL-MCNC: 13 MG/DL — SIGNIFICANT CHANGE UP (ref 10–20)
CALCIUM SERPL-MCNC: 8.4 MG/DL — SIGNIFICANT CHANGE UP (ref 8.4–10.5)
CHLORIDE SERPL-SCNC: 98 MMOL/L — SIGNIFICANT CHANGE UP (ref 98–110)
CO2 SERPL-SCNC: 28 MMOL/L — SIGNIFICANT CHANGE UP (ref 17–32)
CREAT SERPL-MCNC: 0.6 MG/DL — LOW (ref 0.7–1.5)
EGFR: 90 ML/MIN/1.73M2 — SIGNIFICANT CHANGE UP
GLUCOSE SERPL-MCNC: 103 MG/DL — HIGH (ref 70–99)
HCT VFR BLD CALC: 33.7 % — LOW (ref 37–47)
HGB BLD-MCNC: 11.5 G/DL — LOW (ref 12–16)
MAGNESIUM SERPL-MCNC: 2.2 MG/DL — SIGNIFICANT CHANGE UP (ref 1.8–2.4)
MCHC RBC-ENTMCNC: 32.3 PG — HIGH (ref 27–31)
MCHC RBC-ENTMCNC: 34.1 G/DL — SIGNIFICANT CHANGE UP (ref 32–37)
MCV RBC AUTO: 94.7 FL — SIGNIFICANT CHANGE UP (ref 81–99)
NRBC # BLD: 0 /100 WBCS — SIGNIFICANT CHANGE UP (ref 0–0)
PLATELET # BLD AUTO: 241 K/UL — SIGNIFICANT CHANGE UP (ref 130–400)
PMV BLD: 8.6 FL — SIGNIFICANT CHANGE UP (ref 7.4–10.4)
POTASSIUM SERPL-MCNC: 4.6 MMOL/L — SIGNIFICANT CHANGE UP (ref 3.5–5)
POTASSIUM SERPL-SCNC: 4.6 MMOL/L — SIGNIFICANT CHANGE UP (ref 3.5–5)
RBC # BLD: 3.56 M/UL — LOW (ref 4.2–5.4)
RBC # FLD: 14.1 % — SIGNIFICANT CHANGE UP (ref 11.5–14.5)
SODIUM SERPL-SCNC: 135 MMOL/L — SIGNIFICANT CHANGE UP (ref 135–146)
WBC # BLD: 4.73 K/UL — LOW (ref 4.8–10.8)
WBC # FLD AUTO: 4.73 K/UL — LOW (ref 4.8–10.8)

## 2024-01-26 PROCEDURE — 93010 ELECTROCARDIOGRAM REPORT: CPT

## 2024-01-26 PROCEDURE — 99222 1ST HOSP IP/OBS MODERATE 55: CPT

## 2024-01-26 PROCEDURE — 99232 SBSQ HOSP IP/OBS MODERATE 35: CPT

## 2024-01-26 RX ORDER — DILTIAZEM HCL 120 MG
240 CAPSULE, EXT RELEASE 24 HR ORAL DAILY
Refills: 0 | Status: DISCONTINUED | OUTPATIENT
Start: 2024-01-27 | End: 2024-01-27

## 2024-01-26 RX ORDER — DILTIAZEM HCL 120 MG
60 CAPSULE, EXT RELEASE 24 HR ORAL EVERY 6 HOURS
Refills: 0 | Status: COMPLETED | OUTPATIENT
Start: 2024-01-26 | End: 2024-01-26

## 2024-01-26 RX ORDER — FUROSEMIDE 40 MG
20 TABLET ORAL ONCE
Refills: 0 | Status: COMPLETED | OUTPATIENT
Start: 2024-01-26 | End: 2024-01-26

## 2024-01-26 RX ADMIN — Medication 60 MILLIGRAM(S): at 18:27

## 2024-01-26 RX ADMIN — PANTOPRAZOLE SODIUM 40 MILLIGRAM(S): 20 TABLET, DELAYED RELEASE ORAL at 05:02

## 2024-01-26 RX ADMIN — Medication 1 DROP(S): at 16:27

## 2024-01-26 RX ADMIN — LOSARTAN POTASSIUM 50 MILLIGRAM(S): 100 TABLET, FILM COATED ORAL at 05:02

## 2024-01-26 RX ADMIN — Medication 1 APPLICATION(S): at 18:29

## 2024-01-26 RX ADMIN — Medication 80 MILLIGRAM(S): at 18:27

## 2024-01-26 RX ADMIN — RIVAROXABAN 20 MILLIGRAM(S): KIT at 18:27

## 2024-01-26 RX ADMIN — CHLORHEXIDINE GLUCONATE 1 APPLICATION(S): 213 SOLUTION TOPICAL at 05:01

## 2024-01-26 RX ADMIN — Medication 60 MILLIGRAM(S): at 05:02

## 2024-01-26 RX ADMIN — Medication 60 MILLIGRAM(S): at 11:23

## 2024-01-26 RX ADMIN — Medication 1 APPLICATION(S): at 05:03

## 2024-01-26 RX ADMIN — Medication 1 APPLICATION(S): at 11:24

## 2024-01-26 RX ADMIN — Medication 80 MILLIGRAM(S): at 05:02

## 2024-01-26 RX ADMIN — POLYETHYLENE GLYCOL 3350 17 GRAM(S): 17 POWDER, FOR SOLUTION ORAL at 11:24

## 2024-01-26 RX ADMIN — Medication 20 MILLIGRAM(S): at 18:33

## 2024-01-26 NOTE — PROGRESS NOTE ADULT - SUBJECTIVE AND OBJECTIVE BOX
Progress note    INTERVAL HPI/OVERNIGHT EVENTS:    Patient seen and examined at bedside. She feels some shortness of breath. Telemetry monitor shows controlled afib.      REVIEW OF SYSTEMS:  All other 13 Review of systems were reviewed and are negative    FAMILY HISTORY:    T(C): 35.7 (01-26-24 @ 13:57), Max: 36.4 (01-25-24 @ 20:45)  HR: 83 (01-26-24 @ 13:57) (83 - 111)  BP: 102/52 (01-26-24 @ 13:57) (102/52 - 119/57)  RR: 16 (01-26-24 @ 13:57) (16 - 16)  SpO2: 96% (01-26-24 @ 13:57) (93% - 97%)  Wt(kg): --Vital Signs Last 24 Hrs  T(C): 35.7 (26 Jan 2024 13:57), Max: 36.4 (25 Jan 2024 20:45)  T(F): 96.2 (26 Jan 2024 13:57), Max: 97.5 (25 Jan 2024 20:45)  HR: 83 (26 Jan 2024 13:57) (83 - 111)  BP: 102/52 (26 Jan 2024 13:57) (102/52 - 119/57)  BP(mean): --  RR: 16 (26 Jan 2024 13:57) (16 - 16)  SpO2: 96% (26 Jan 2024 13:57) (93% - 97%)    Parameters below as of 26 Jan 2024 13:57  Patient On (Oxygen Delivery Method): room air      penicillin V potassium (Unknown)  naproxen (Unknown)  Cipro (Sedation/Somnol)  amoxicillin (Other (Mild to Mod))  sulfonamides (Unknown)  cephalexin (Unknown)      PHYSICAL EXAM:    GEN: No acute distress  LUNGS: Clear to auscultation bilaterally   HEART: S1/S2 present. irregularly irregular .   ABD: Soft, non-tender, non-distended. Bowel sounds present  EXT: NC/NC/NE/2+PP/DUDLEY  NEURO: AAOX3  SKIN: intact     Consultant(s) Notes Reviewed:  [x ] YES  [ ] NO  Care Discussed with Consultants/Other Providers [ x] YES  [ ] NO    LABS:      RADIOLOGY & ADDITIONAL TESTS:    Imaging Personally Reviewed:  [ ] YES  [ ] NO  acetaminophen     Tablet .. 650 milliGRAM(s) Oral every 6 hours PRN  artificial  tears Solution 1 Drop(s) Both EYES four times a day PRN  chlorhexidine 2% Cloths 1 Application(s) Topical <User Schedule>  diltiazem    Tablet 60 milliGRAM(s) Oral every 6 hours  influenza  Vaccine (HIGH DOSE) 0.7 milliLiter(s) IntraMuscular once  losartan 50 milliGRAM(s) Oral daily  magnesium hydroxide Suspension 30 milliLiter(s) Oral daily PRN  melatonin 3 milliGRAM(s) Oral at bedtime PRN  pantoprazole    Tablet 40 milliGRAM(s) Oral before breakfast  polyethylene glycol 3350 17 Gram(s) Oral daily  rivaroxaban 20 milliGRAM(s) Oral with dinner  senna 2 Tablet(s) Oral at bedtime  sodium chloride 0.65% Nasal 1 Spray(s) Both Nostrils every 8 hours PRN  sotalol. 80 milliGRAM(s) Oral every 12 hours  vitamin A &amp; D Ointment 1 Application(s) Topical every 6 hours      HEALTH ISSUES - PROBLEM Dx:    83 yr old female with hx of A-fib on Xarelto s/p ablation one week ago patient reports she was laying in bed a lot since. Patient reports severe constipation started 1/9/24.    #Fecal impaction with stercoral colitis   - disimpacted in the ED and by GI on 1/18, S/p large BM 1/24  - K corrected, Ca supplementation given  - added po laxatives  - encouraged PT/OOB  - resume regular diet  - PT recommended RW and rehab vs home PT  - KUB with moderate stool burden    # Acute on chronic Afb   - pt failed ablation performed on 1/10/23  - s/p multiple DCCV failed, s/p cryoablation on 1/9/24 - back in afib now   - Was hypotensive and thought to be related to hypotensive, but still unctrolled s/p IVF  - c/w Xarelto  -Last TTE from 2022 shows normal LVEF  -TTE - LVEF 57%, severe MVR, Mod TR  - sotalol 80mg bid, Plan for cardizem 240 in am, give 1 dose lasix today  -Consult cardiology recs appreciate   -EKG reviewed, afib no signs of RVR    # hx of temporal arteritis   - normally on actemera and prolia at home  - recommended that she f/u with her rheumatologist on whether to continue both medications as constipation and hypocalcemia are SE of both  - please make sure to put this info in her discharge paperwork as per pt request  - calcium replenished    # Urinary Retention due to fecal impaction   - TOV today    # DVT Ppx   - on Xarelto    Full Code    Healthcare maintanence on discharge : Colonoscopy OP follow up with Dr. Hunt. Needs referral to structural heart dz on discharge    If not short of breath, plan for dc in am

## 2024-01-26 NOTE — PROGRESS NOTE ADULT - SUBJECTIVE AND OBJECTIVE BOX
83yFemale  Being followed for fecal impaction  Interval history: Patient denies nausea, vomiting, hematemesis, melena, blood in stool, diarrhea, constipation, abdominal pain. Patient doing well, having bms.      PAST MEDICAL & SURGICAL HISTORY:   Atrial fibrillation      HTN (hypertension)      History of temporal arteritis      History of temporal artery biopsy      Osteoporosis      H/O temporal arteritis      S/P cataract surgery                Social History: No smoking. No alcohol. No illegal drug use.            MEDICATIONS  (STANDING):  chlorhexidine 2% Cloths 1 Application(s) Topical <User Schedule>  diltiazem    Tablet 60 milliGRAM(s) Oral every 6 hours  influenza  Vaccine (HIGH DOSE) 0.7 milliLiter(s) IntraMuscular once  losartan 50 milliGRAM(s) Oral daily  pantoprazole    Tablet 40 milliGRAM(s) Oral before breakfast  polyethylene glycol 3350 17 Gram(s) Oral daily  rivaroxaban 20 milliGRAM(s) Oral with dinner  senna 2 Tablet(s) Oral at bedtime  sotalol. 80 milliGRAM(s) Oral every 12 hours  vitamin A &amp; D Ointment 1 Application(s) Topical every 6 hours    MEDICATIONS  (PRN):  acetaminophen     Tablet .. 650 milliGRAM(s) Oral every 6 hours PRN Temp greater or equal to 38C (100.4F), Mild Pain (1 - 3)  magnesium hydroxide Suspension 30 milliLiter(s) Oral daily PRN Constipation  melatonin 3 milliGRAM(s) Oral at bedtime PRN Insomnia  sodium chloride 0.65% Nasal 1 Spray(s) Both Nostrils every 8 hours PRN Nasal Congestion      Allergies:  penicillin V potassium (Unknown)  naproxen (Unknown)  Cipro (Sedation/Somnol)  amoxicillin (Other (Mild to Mod))  sulfonamides (Unknown)  cephalexin (Unknown)  Intolerances          REVIEW OF SYSTEMS:  General:  No weight loss, fevers, or chills.  Eyes:  No reported pain or visual changes  ENT:  No sore throat or runny nose.  NECK: No stiffness   CV:  No chest pain or palpitations.  Resp:  No shortness of breath, cough  GI:  No abdominal pain, nausea, vomiting, dysphagia, diarrhea or constipation. No rectal bleeding, melena, or hematemesis.  Muscle:  No aches or weakness  Neuro:  No tingling, numbness           VITAL SIGNS:   T(F): 96.2 (01-26-24 @ 13:57), Max: 97.5 (01-25-24 @ 20:45)  HR: 83 (01-26-24 @ 13:57) (83 - 111)  BP: 102/52 (01-26-24 @ 13:57) (102/52 - 119/70)  RR: 16 (01-26-24 @ 13:57) (16 - 18)  SpO2: 96% (01-26-24 @ 13:57) (93% - 97%)    PHYSICAL EXAM:  GENERAL: AAOx3, no acute distress.  HEAD:  Atraumatic, Normocephalic  EYES: conjunctiva and sclera clear  NECK: Supple, no JVD or thyromegaly  CHEST/LUNG: Clear to auscultation bilaterally; No wheeze, rhonchi, or rales  HEART: Regular rate and rhythm; normal S1, S2, No murmurs.  ABDOMEN: Soft, nontender, nondistended; Bowel sounds present  NEUROLOGY: No asterixis or tremor.   SKIN: Intact, no jaundice            LABS:                        11.5   4.73  )-----------( 241      ( 26 Jan 2024 06:59 )             33.7     01-26    135  |  98  |  13  ----------------------------<  103<H>  4.6   |  28  |  0.6<L>    Ca    8.4      26 Jan 2024 06:59  Mg     2.2     01-26            IMAGING:    < from: CT Abdomen and Pelvis w/ IV Cont (01.17.24 @ 12:30) >    ACC: [id number] EXAM:  CT ABDOMEN AND PELVIS IC   ORDERED BY: ISABEL CLEVELAND     PROCEDURE DATE:  01/17/2024          INTERPRETATION:  CLINICAL STATEMENT: Constipation    TECHNIQUE: Contiguous axial CT images were obtained from the lower chest   to the pubic symphysis following administration of intravenous contrast.    90 cc administered of Omnipaque 350 (10 cc discarded).  Oral contrast was   not administered.  Reformatted images in the coronal and sagittal planes   were acquired.    COMPARISON CT: None.    OTHER STUDIES USED FOR CORRELATION: None.      FINDINGS:    LOWER CHEST: Right middle lobe tree-in-bud and nodular opacities with   areas of bronchiectasis. Additional scattered nodular opacities noted in   the left lower lobe.    HEPATOBILIARY: Unremarkable.    SPLEEN: Unremarkable.    PANCREAS: Unremarkable.    ADRENAL GLANDS: Unremarkable.    KIDNEYS: Symmetric renal enhancement bilaterally. No hydronephrosis.    ABDOMINOPELVIC NODES: No lymphadenopathy.    PELVIC ORGANS: Unremarkable.    PERITONEUM/MESENTERY/BOWEL: Large diffuse colonic stool burden. Stool   filled distended rectum measures 7.5 cm in diameter with mild hazy   adjacent edema/fat stranding. No bowel obstruction, ascites or   pneumoperitoneum. Normal appendix.    BONES/SOFT TISSUES: Bilateral L5 pars defects with grade 2   anterolisthesis of L5 on S1.    OTHER: Scattered atherosclerotic vascular calcifications.      IMPRESSION:  1.  Stool filled distended rectum measuring 7.5 cm in diameter with mild   surrounding inflammatory changes, suspicious for developing stercoral   proctitis. Large diffuse colonic stool burden.  2.  Evidence of bibasilar infectious/inflammatory small airways disease.    --- End of Report ---            LALA RAMIREZ MD; Attending Radiologist  This document has been electronically signed. Jan 17 2024  1:36PM    < end of copied text >    < from: Xray Kidney Ureter Bladder (01.25.24 @ 02:37) >    ACC: [id number] EXAM:  XR KUB 1 VIEW   ORDERED BY: TAWNYA GRACIA     PROCEDURE DATE:  01/25/2024          INTERPRETATION:  CLINICAL HISTORY / REASON FOR EXAM: Abdominal pain    COMPARISON: Abdominal radiograph from January 23, 2024    TECHNIQUE: Abdominal radiograph, three images    FINDINGS:    TUBES/LINES: None.    PERITONEUM/MESENTERY/BOWEL: Moderate stool burden with stool overlying   the ascending colon and rectum. Limited evaluation for pneumoperitoneum   on supine radiographs.    BONES/SOFT TISSUES: No acute osseous abnormality.      IMPRESSION:    Moderate stool burden with stool overlying the ascending colon and rectum.    --- End of Report ---            STEPHANIE NUÑEZ MD; Attending Radiologist  This document has been electronically signed. Jan 25 2024  8:28AM    < end of copied text >

## 2024-01-26 NOTE — PROGRESS NOTE ADULT - TIME BILLING
direct pt care
50 minutes spent on total encounter; more than 50% of the visit was spent counseling and / or coordinating care by the attending physician.  The necessity of the time spent during the encounter on this date of service was due to: Coordination of care.
Coordination of care
50 minutes spent on total encounter; more than 50% of the visit was spent counseling and / or coordinating care by the attending physician.  The necessity of the time spent during the encounter on this date of service was due to: Coordination of care.
Coordination of care
direct pt care

## 2024-01-26 NOTE — PROGRESS NOTE ADULT - NS ATTEND AMEND GEN_ALL_CORE FT
Patient seen and examined on GI rounds. Labs and imaging where pertinent reviewed by me. Plan as above and modified where needed.
Continue laxative, if fecal impaction does not resolve may need surgical evaluation
Pt feeling better, having bms. Continue bowel regimen. Ambulate as tolerated. Pt will resume outpt GI follow up.
Fecal impaction continue enemas Q 2 hours. Consult surgery if cannot disimpact.

## 2024-01-26 NOTE — PROGRESS NOTE ADULT - ASSESSMENT
83 y.o F w/ hx of A-fib on Xarelto s/p ablation one week ago prior to hospital admission came to the hospital for constipation. GI was following for constipation.     # Constipation DD: ?immobility after the procedure  vs less likely 2/2 anesthesia from the ablation (unusual for the medications for long term effects vs r/o malignancy)  -used to have regular BM before ablation   -TSH normal, vitals normal hemodynamically stable   -initial CT showed  rectum measuring 7.5 cm in diameter with mild surrounding inflammatory changes, suspicious for developing stercoral proctitis. Large diffuse colonic stool burden.  -patient reports last colonoscopy 8 years ago with Dr. Hunt wnl  - had disimpaction 1/18   -Abdomen is soft on examination   - s/p golytely and having multiple bms   - KUB reviewed stool burden improving     Recs:   Diet as tolerated (high fiber diet)   encourage ambulation   Avoid anticholinergics opiates CCB which can dec motility of the colon   keep electrolytes within normal limits   Senna 2 tabs at bedtime, Miralax BID  will benefit from colonoscopy OP follow up with Dr. Hunt     Recall GI if needed

## 2024-01-27 LAB
ALBUMIN SERPL ELPH-MCNC: 3.6 G/DL — SIGNIFICANT CHANGE UP (ref 3.5–5.2)
ALP SERPL-CCNC: 54 U/L — SIGNIFICANT CHANGE UP (ref 30–115)
ALT FLD-CCNC: 25 U/L — SIGNIFICANT CHANGE UP (ref 0–41)
ANION GAP SERPL CALC-SCNC: 7 MMOL/L — SIGNIFICANT CHANGE UP (ref 7–14)
AST SERPL-CCNC: 19 U/L — SIGNIFICANT CHANGE UP (ref 0–41)
BASOPHILS # BLD AUTO: 0.04 K/UL — SIGNIFICANT CHANGE UP (ref 0–0.2)
BASOPHILS NFR BLD AUTO: 0.9 % — SIGNIFICANT CHANGE UP (ref 0–1)
BILIRUB SERPL-MCNC: 0.6 MG/DL — SIGNIFICANT CHANGE UP (ref 0.2–1.2)
BUN SERPL-MCNC: 14 MG/DL — SIGNIFICANT CHANGE UP (ref 10–20)
CALCIUM SERPL-MCNC: 8.6 MG/DL — SIGNIFICANT CHANGE UP (ref 8.4–10.5)
CHLORIDE SERPL-SCNC: 94 MMOL/L — LOW (ref 98–110)
CO2 SERPL-SCNC: 29 MMOL/L — SIGNIFICANT CHANGE UP (ref 17–32)
CREAT SERPL-MCNC: 0.5 MG/DL — LOW (ref 0.7–1.5)
EGFR: 94 ML/MIN/1.73M2 — SIGNIFICANT CHANGE UP
EOSINOPHIL # BLD AUTO: 0.14 K/UL — SIGNIFICANT CHANGE UP (ref 0–0.7)
EOSINOPHIL NFR BLD AUTO: 3 % — SIGNIFICANT CHANGE UP (ref 0–8)
GLUCOSE SERPL-MCNC: 96 MG/DL — SIGNIFICANT CHANGE UP (ref 70–99)
HCT VFR BLD CALC: 32.4 % — LOW (ref 37–47)
HGB BLD-MCNC: 11.3 G/DL — LOW (ref 12–16)
IMM GRANULOCYTES NFR BLD AUTO: 0.2 % — SIGNIFICANT CHANGE UP (ref 0.1–0.3)
LYMPHOCYTES # BLD AUTO: 0.79 K/UL — LOW (ref 1.2–3.4)
LYMPHOCYTES # BLD AUTO: 17 % — LOW (ref 20.5–51.1)
MAGNESIUM SERPL-MCNC: 2.1 MG/DL — SIGNIFICANT CHANGE UP (ref 1.8–2.4)
MCHC RBC-ENTMCNC: 32.8 PG — HIGH (ref 27–31)
MCHC RBC-ENTMCNC: 34.9 G/DL — SIGNIFICANT CHANGE UP (ref 32–37)
MCV RBC AUTO: 93.9 FL — SIGNIFICANT CHANGE UP (ref 81–99)
MONOCYTES # BLD AUTO: 0.62 K/UL — HIGH (ref 0.1–0.6)
MONOCYTES NFR BLD AUTO: 13.3 % — HIGH (ref 1.7–9.3)
NEUTROPHILS # BLD AUTO: 3.06 K/UL — SIGNIFICANT CHANGE UP (ref 1.4–6.5)
NEUTROPHILS NFR BLD AUTO: 65.6 % — SIGNIFICANT CHANGE UP (ref 42.2–75.2)
NRBC # BLD: 0 /100 WBCS — SIGNIFICANT CHANGE UP (ref 0–0)
PHOSPHATE SERPL-MCNC: 3.1 MG/DL — SIGNIFICANT CHANGE UP (ref 2.1–4.9)
PLATELET # BLD AUTO: 236 K/UL — SIGNIFICANT CHANGE UP (ref 130–400)
PMV BLD: 8.7 FL — SIGNIFICANT CHANGE UP (ref 7.4–10.4)
POTASSIUM SERPL-MCNC: 4.8 MMOL/L — SIGNIFICANT CHANGE UP (ref 3.5–5)
POTASSIUM SERPL-SCNC: 4.8 MMOL/L — SIGNIFICANT CHANGE UP (ref 3.5–5)
PROT SERPL-MCNC: 5.6 G/DL — LOW (ref 6–8)
RBC # BLD: 3.45 M/UL — LOW (ref 4.2–5.4)
RBC # FLD: 14 % — SIGNIFICANT CHANGE UP (ref 11.5–14.5)
SODIUM SERPL-SCNC: 130 MMOL/L — LOW (ref 135–146)
WBC # BLD: 4.66 K/UL — LOW (ref 4.8–10.8)
WBC # FLD AUTO: 4.66 K/UL — LOW (ref 4.8–10.8)

## 2024-01-27 PROCEDURE — 99232 SBSQ HOSP IP/OBS MODERATE 35: CPT

## 2024-01-27 RX ORDER — DILTIAZEM HCL 120 MG
300 CAPSULE, EXT RELEASE 24 HR ORAL DAILY
Refills: 0 | Status: DISCONTINUED | OUTPATIENT
Start: 2024-01-28 | End: 2024-01-30

## 2024-01-27 RX ADMIN — Medication 80 MILLIGRAM(S): at 18:54

## 2024-01-27 RX ADMIN — Medication 1 APPLICATION(S): at 05:23

## 2024-01-27 RX ADMIN — Medication 1 APPLICATION(S): at 23:51

## 2024-01-27 RX ADMIN — CHLORHEXIDINE GLUCONATE 1 APPLICATION(S): 213 SOLUTION TOPICAL at 05:21

## 2024-01-27 RX ADMIN — Medication 80 MILLIGRAM(S): at 05:56

## 2024-01-27 RX ADMIN — POLYETHYLENE GLYCOL 3350 17 GRAM(S): 17 POWDER, FOR SOLUTION ORAL at 13:05

## 2024-01-27 RX ADMIN — LOSARTAN POTASSIUM 50 MILLIGRAM(S): 100 TABLET, FILM COATED ORAL at 05:23

## 2024-01-27 RX ADMIN — Medication 1 APPLICATION(S): at 18:55

## 2024-01-27 RX ADMIN — Medication 240 MILLIGRAM(S): at 05:22

## 2024-01-27 RX ADMIN — Medication 1 APPLICATION(S): at 13:10

## 2024-01-27 RX ADMIN — PANTOPRAZOLE SODIUM 40 MILLIGRAM(S): 20 TABLET, DELAYED RELEASE ORAL at 05:23

## 2024-01-27 RX ADMIN — RIVAROXABAN 20 MILLIGRAM(S): KIT at 18:56

## 2024-01-27 NOTE — PROGRESS NOTE ADULT - SUBJECTIVE AND OBJECTIVE BOX
Progress note    INTERVAL HPI/OVERNIGHT EVENTS:    Patient seen and examined at bedside. She is resting in bed and states her feet are swollen and feels sob st rest. Telemetry monitor shows HR ~ 120 intermittently      REVIEW OF SYSTEMS:  All other 13 Review of systems were reviewed and are negative    FAMILY HISTORY:    T(C): 35.7 (01-27-24 @ 04:30), Max: 36.1 (01-26-24 @ 21:00)  HR: 109 (01-27-24 @ 04:30) (83 - 109)  BP: 117/75 (01-27-24 @ 04:30) (102/52 - 142/56)  RR: 16 (01-27-24 @ 04:30) (16 - 16)  SpO2: 95% (01-27-24 @ 04:30) (94% - 96%)  Wt(kg): --Vital Signs Last 24 Hrs  T(C): 35.7 (27 Jan 2024 04:30), Max: 36.1 (26 Jan 2024 21:00)  T(F): 96.2 (27 Jan 2024 04:30), Max: 96.9 (26 Jan 2024 21:00)  HR: 109 (27 Jan 2024 04:30) (83 - 109)  BP: 117/75 (27 Jan 2024 04:30) (102/52 - 142/56)  BP(mean): --  RR: 16 (27 Jan 2024 04:30) (16 - 16)  SpO2: 95% (27 Jan 2024 04:30) (94% - 96%)    Parameters below as of 27 Jan 2024 04:30  Patient On (Oxygen Delivery Method): room air      penicillin V potassium (Unknown)  naproxen (Unknown)  Cipro (Sedation/Somnol)  amoxicillin (Other (Mild to Mod))  sulfonamides (Unknown)  cephalexin (Unknown)      PHYSICAL EXAM:    GEN: No acute distress  LUNGS: Clear to auscultation bilaterally   HEART: S1/S2 present. irregularly irregular .   ABD: Soft, non-tender, non-distended. Bowel sounds present  EXT: +1 bilateral jim  NEURO: AAOX3  SKIN: intact     Consultant(s) Notes Reviewed:  [x ] YES  [ ] NO  Care Discussed with Consultants/Other Providers [ x] YES  [ ] NO    LABS:      RADIOLOGY & ADDITIONAL TESTS:    Imaging Personally Reviewed:  [ ] YES  [ ] NO  acetaminophen     Tablet .. 650 milliGRAM(s) Oral every 6 hours PRN  artificial  tears Solution 1 Drop(s) Both EYES four times a day PRN  chlorhexidine 2% Cloths 1 Application(s) Topical <User Schedule>  diltiazem    milliGRAM(s) Oral daily  hydrochlorothiazide 12.5 milliGRAM(s) Oral daily  influenza  Vaccine (HIGH DOSE) 0.7 milliLiter(s) IntraMuscular once  losartan 50 milliGRAM(s) Oral daily  magnesium hydroxide Suspension 30 milliLiter(s) Oral daily PRN  melatonin 3 milliGRAM(s) Oral at bedtime PRN  pantoprazole    Tablet 40 milliGRAM(s) Oral before breakfast  polyethylene glycol 3350 17 Gram(s) Oral daily  rivaroxaban 20 milliGRAM(s) Oral with dinner  senna 2 Tablet(s) Oral at bedtime  sodium chloride 0.65% Nasal 1 Spray(s) Both Nostrils every 8 hours PRN  sotalol. 80 milliGRAM(s) Oral every 12 hours  vitamin A &amp; D Ointment 1 Application(s) Topical every 6 hours      HEALTH ISSUES - PROBLEM Dx:    83 yr old female with hx of A-fib on Xarelto s/p ablation one week ago patient reports she was laying in bed a lot since. Patient reports severe constipation started 1/9/24.    #Fecal impaction with stercoral colitis   - disimpacted in the ED and by GI on 1/18, S/p large BM 1/24  - K corrected, Ca supplementation given  - c/w po laxatives  - encouraged PT/OOB  - resume regular diet  - PT recommended RW and rehab vs home PT  - KUB with moderate stool burden    # Acute on chronic Afb   - pt failed ablation performed on 1/10/23  - s/p multiple DCCV failed, s/p cryoablation on 1/9/24 - back in afib now   - Was hypotensive and thought to be related to hypotensive, but still unctrolled s/p IVF  - c/w Xarelto  -Last TTE from 2022 shows normal LVEF  -TTE - LVEF 57%, severe MVR, Mod TR  - sotalol 80mg bid, Increase cardizem 300 in am  -Consult cardiology recs appreciate   -EKG reviewed, afib no signs of RVR    # hx of temporal arteritis   - normally on actemera and prolia at home  - recommended that she f/u with her rheumatologist on whether to continue both medications as constipation and hypocalcemia are SE of both  - please make sure to put this info in her discharge paperwork as per pt request  - calcium replenished    # Urinary Retention due to fecal impaction   - TOV today    # DVT Ppx   - on Xarelto    Full Code    Healthcare maintanence on discharge : Colonoscopy OP follow up with Dr. Hunt. Needs referral to structural heart dz on discharge    Ambulate, if HR can tolerate, then plan for dc tomorrow on new dose of cardizem

## 2024-01-28 ENCOUNTER — TRANSCRIPTION ENCOUNTER (OUTPATIENT)
Age: 84
End: 2024-01-28

## 2024-01-28 LAB
ANION GAP SERPL CALC-SCNC: 10 MMOL/L — SIGNIFICANT CHANGE UP (ref 7–14)
BUN SERPL-MCNC: 14 MG/DL — SIGNIFICANT CHANGE UP (ref 10–20)
CALCIUM SERPL-MCNC: 8.9 MG/DL — SIGNIFICANT CHANGE UP (ref 8.4–10.5)
CHLORIDE SERPL-SCNC: 96 MMOL/L — LOW (ref 98–110)
CO2 SERPL-SCNC: 27 MMOL/L — SIGNIFICANT CHANGE UP (ref 17–32)
CREAT SERPL-MCNC: 0.5 MG/DL — LOW (ref 0.7–1.5)
EGFR: 94 ML/MIN/1.73M2 — SIGNIFICANT CHANGE UP
GLUCOSE SERPL-MCNC: 100 MG/DL — HIGH (ref 70–99)
MAGNESIUM SERPL-MCNC: 2.1 MG/DL — SIGNIFICANT CHANGE UP (ref 1.8–2.4)
POTASSIUM SERPL-MCNC: 4.2 MMOL/L — SIGNIFICANT CHANGE UP (ref 3.5–5)
POTASSIUM SERPL-SCNC: 4.2 MMOL/L — SIGNIFICANT CHANGE UP (ref 3.5–5)
SODIUM SERPL-SCNC: 133 MMOL/L — LOW (ref 135–146)

## 2024-01-28 PROCEDURE — 99231 SBSQ HOSP IP/OBS SF/LOW 25: CPT

## 2024-01-28 RX ORDER — DILTIAZEM HCL 120 MG
1 CAPSULE, EXT RELEASE 24 HR ORAL
Qty: 30 | Refills: 0
Start: 2024-01-28 | End: 2024-02-26

## 2024-01-28 RX ORDER — POLYETHYLENE GLYCOL 3350 17 G/17G
17 POWDER, FOR SOLUTION ORAL
Qty: 119 | Refills: 0
Start: 2024-01-28 | End: 2024-02-03

## 2024-01-28 RX ORDER — SENNA PLUS 8.6 MG/1
2 TABLET ORAL
Qty: 60 | Refills: 0
Start: 2024-01-28 | End: 2024-02-26

## 2024-01-28 RX ORDER — FUROSEMIDE 40 MG
40 TABLET ORAL ONCE
Refills: 0 | Status: COMPLETED | OUTPATIENT
Start: 2024-01-28 | End: 2024-01-28

## 2024-01-28 RX ADMIN — RIVAROXABAN 20 MILLIGRAM(S): KIT at 17:40

## 2024-01-28 RX ADMIN — LOSARTAN POTASSIUM 50 MILLIGRAM(S): 100 TABLET, FILM COATED ORAL at 05:37

## 2024-01-28 RX ADMIN — Medication 1 APPLICATION(S): at 11:10

## 2024-01-28 RX ADMIN — Medication 300 MILLIGRAM(S): at 05:36

## 2024-01-28 RX ADMIN — CHLORHEXIDINE GLUCONATE 1 APPLICATION(S): 213 SOLUTION TOPICAL at 05:38

## 2024-01-28 RX ADMIN — POLYETHYLENE GLYCOL 3350 17 GRAM(S): 17 POWDER, FOR SOLUTION ORAL at 11:10

## 2024-01-28 RX ADMIN — PANTOPRAZOLE SODIUM 40 MILLIGRAM(S): 20 TABLET, DELAYED RELEASE ORAL at 05:37

## 2024-01-28 RX ADMIN — Medication 40 MILLIGRAM(S): at 10:59

## 2024-01-28 RX ADMIN — Medication 80 MILLIGRAM(S): at 05:37

## 2024-01-28 RX ADMIN — Medication 80 MILLIGRAM(S): at 17:40

## 2024-01-28 NOTE — DISCHARGE NOTE PROVIDER - NSDCFUADDAPPT_GEN_ALL_CORE_FT
APPTS ARE READY TO BE MADE: [ ] YES    Best Family or Patient Contact (if needed):    Additional Information about above appointments (if needed):    1: Dr. Ledbetter (structural heart team)  2:   3:     Other comments or requests:

## 2024-01-28 NOTE — DISCHARGE NOTE PROVIDER - CARE PROVIDER_API CALL
Kwaku Hunt  Gastroenterology  [address] [facility] [place] [address]  Phone: [phone]  Fax: [phone]  Follow Up Time:     Shorty Santana  Internal Medicine  [address] [place] [address]  Phone: [phone]  Fax: [phone]  Follow Up Time:     Justice Ledbetter  Interventional Cardiology  [address] [place] [address], [address]  [place] [address]  Phone: [phone]  Fax: [phone]  Follow Up Time:    Kwaku Hunt  Gastroenterology  [address] [place] [address]  Phone: ([phone]  Fax: ()-  Follow Up Time:     Shorty Santana  Internal Medicine  [address] [place] [address]  Phone: [phone]  Fax: [phone]  Follow Up Time:     Justice Ledbetter  Interventional Cardiology  [address] [place] [address], [address]  [place] [address]  Phone: [phone]  Fax: [phone]  Scheduled Appointment: 02/01/2024 12:30 PM   Kwaku Hunt  Gastroenterology  [address] [place] [address]  Phone: ([phone]  Fax: ()-  Follow Up Time:     Shorty Santana  Internal Medicine  [address] [place] [address]  Phone: [phone]  Fax: [phone]  Follow Up Time:     Justice Ledbetter  Interventional Cardiology  [address] [facility], [address]  [place] [address]  Phone: [phone]  Fax: [phone]  Scheduled Appointment: 02/01/2024 12:30 PM    Theresa Mejia  Internal Medicine  [address] [place] [address]  Phone: [phone]  Fax: [phone]  Follow Up Time:

## 2024-01-28 NOTE — DISCHARGE NOTE PROVIDER - NSDCFUSCHEDAPPT_GEN_ALL_CORE_FT
Chance Neely  [facility]  CTSURG [address]  Scheduled Appointment: 02/01/2024    Justice Ledbetter  [facility]  CARDIOLOGY [address]  Scheduled Appointment: 02/01/2024

## 2024-01-28 NOTE — DISCHARGE NOTE PROVIDER - HOSPITAL COURSE
Patient is an 84yo F w. pmhx of AFib on Xarelto s/p ablation one week ago, pHTN, Temporal arteritis, Osteoporosis presenting with complaints of constipation since last week Tuesday 01/09. Reports had ablation w/ CT attending Dr. Lester on week ago, since than has been extremely constipated with very little appetite. Reports taking a suppository on Sunday with very small amount of watery brown stool. Endorses generalized cramping lower abdominal pain. Denies fever, chills, nausea, vomiting, chest pain, palpitation, sob, or any urinary sxs    83 yr old female with hx of A-fib on Xarelto s/p ablation one week ago patient reports she was laying in bed a lot since. Patient reports severe constipation started 1/9/24.    #Fecal impaction with stercoral colitis   - disimpacted in the ED and by GI on 1/18, S/p large BM 1/24  - K corrected, Ca supplementation given  - c/w po laxatives  - encouraged PT/OOB  - resume regular diet  - PT recommended RW and rehab vs home PT  - KUB with moderate stool burden    # Acute on chronic Afb   - pt failed ablation performed on 1/10/23  - s/p multiple DCCV failed, s/p cryoablation on 1/9/24 - back in afib now   - Was hypotensive and thought to be related to hypotensive, but still unctrolled s/p IVF  - c/w Xarelto  -Last TTE from 2022 shows normal LVEF  -TTE - LVEF 57%, severe MVR, Mod TR  - sotalol 80mg bid, c/w cardizem  -Consult cardiology recs appreciate   -EKG reviewed, afib no signs of RVR    # hx of temporal arteritis   - normally on actemera and prolia at home  - recommended that she f/u with her rheumatologist on whether to continue both medications as constipation and hypocalcemia are SE of both  - please make sure to put this info in her discharge paperwork as per pt request  - calcium replenished    # Urinary Retention due to fecal impaction   - TOV today    # DVT Ppx   - on Xarelto    Full Code    Healthcare maintanence on discharge : Colonoscopy OP follow up with Dr. Hunt. Needs referral to structural heart dz on discharge    Stable for discharge today if ambulatory HR is controlled. Patient is an 82yo F w. pmhx of AFib on Xarelto s/p ablation one week ago, pHTN, Temporal arteritis, Osteoporosis presenting with complaints of constipation since last week Tuesday 01/09. Reports had ablation w/ CT attending Dr. Lester on week ago, since than has been extremely constipated with very little appetite. Reports taking a suppository on Sunday with very small amount of watery brown stool. Endorses generalized cramping lower abdominal pain. Denies fever, chills, nausea, vomiting, chest pain, palpitation, sob, or any urinary sxs    83 yr old female with hx of A-fib on Xarelto s/p ablation one week ago patient reports she was laying in bed a lot since. Patient reports severe constipation started 1/9/24.    #Fecal impaction with stercoral colitis   - disimpacted in the ED and by GI on 1/18, S/p large BM 1/24  - K corrected, Ca supplementation given  - c/w po laxatives  - encouraged PT/OOB  - resume regular diet  - PT recommended RW and rehab vs home PT  - KUB with moderate stool burden    # Acute on chronic Afb   # Severe MVR  - pt failed ablation performed on 1/10/23  - s/p multiple DCCV failed, s/p cryoablation on 1/9/24 - back in afib now   - Was hypotensive and thought to be related to hypotensive, now controlled  - c/w Xarelto  -TTE - LVEF 57%, severe MVR, Mod TR  - sotalol 80mg bid, c/w cardizem 300mg qd  -Consult cardiology recs appreciate   -EKG reviewed, afib no signs of RVR  -Structural evaluation on 2/1/24 in dc summary    # hx of temporal arteritis   - normally on actemera and prolia at home  - recommended that she f/u with her rheumatologist on whether to continue both medications as constipation and hypocalcemia are SE of both  - please make sure to put this info in her discharge paperwork as per pt request  - calcium replenished    # Urinary Retention due to fecal impaction   - TOV today    # DVT Ppx   - on Xarelto    Full Code

## 2024-01-28 NOTE — PROGRESS NOTE ADULT - SUBJECTIVE AND OBJECTIVE BOX
Progress note    INTERVAL HPI/OVERNIGHT EVENTS:    Patient seen and examined at bedside. No acute distress but states she feels short of breath.       REVIEW OF SYSTEMS:  All other 13 Review of systems were reviewed and are negative    FAMILY HISTORY:    T(C): 35.7 (01-28-24 @ 15:16), Max: 36.2 (01-28-24 @ 04:00)  HR: 89 (01-28-24 @ 15:16) (70 - 98)  BP: 103/50 (01-28-24 @ 15:16) (103/50 - 120/65)  RR: 16 (01-28-24 @ 04:00) (16 - 16)  SpO2: 95% (01-28-24 @ 10:56) (92% - 95%)  Wt(kg): --Vital Signs Last 24 Hrs  T(C): 35.7 (28 Jan 2024 15:16), Max: 36.2 (28 Jan 2024 04:00)  T(F): 96.3 (28 Jan 2024 15:16), Max: 97.1 (28 Jan 2024 04:00)  HR: 89 (28 Jan 2024 15:16) (70 - 98)  BP: 103/50 (28 Jan 2024 15:16) (103/50 - 120/65)  BP(mean): --  RR: 16 (28 Jan 2024 04:00) (16 - 16)  SpO2: 95% (28 Jan 2024 10:56) (92% - 95%)    Parameters below as of 28 Jan 2024 10:56  Patient On (Oxygen Delivery Method): room air      penicillin V potassium (Unknown)  naproxen (Unknown)  Cipro (Sedation/Somnol)  amoxicillin (Other (Mild to Mod))  sulfonamides (Unknown)  cephalexin (Unknown)      PHYSICAL EXAM:    GEN: No acute distress  LUNGS: Clear to auscultation bilaterally   HEART: S1/S2 present. irregularly irregular .   ABD: Soft, non-tender, non-distended. Bowel sounds present  EXT: +1 bilateral jim  NEURO: AAOX3  SKIN: intact     Consultant(s) Notes Reviewed:  [x ] YES  [ ] NO  Care Discussed with Consultants/Other Providers [ x] YES  [ ] NO    LABS:      RADIOLOGY & ADDITIONAL TESTS:    Imaging Personally Reviewed:  [ ] YES  [ ] NO  acetaminophen     Tablet .. 650 milliGRAM(s) Oral every 6 hours PRN  artificial  tears Solution 1 Drop(s) Both EYES four times a day PRN  chlorhexidine 2% Cloths 1 Application(s) Topical <User Schedule>  diltiazem    milliGRAM(s) Oral daily  hydrochlorothiazide 12.5 milliGRAM(s) Oral daily  influenza  Vaccine (HIGH DOSE) 0.7 milliLiter(s) IntraMuscular once  losartan 50 milliGRAM(s) Oral daily  magnesium hydroxide Suspension 30 milliLiter(s) Oral daily PRN  melatonin 3 milliGRAM(s) Oral at bedtime PRN  pantoprazole    Tablet 40 milliGRAM(s) Oral before breakfast  polyethylene glycol 3350 17 Gram(s) Oral daily  rivaroxaban 20 milliGRAM(s) Oral with dinner  senna 2 Tablet(s) Oral at bedtime  sodium chloride 0.65% Nasal 1 Spray(s) Both Nostrils every 8 hours PRN  sotalol. 80 milliGRAM(s) Oral every 12 hours  vitamin A &amp; D Ointment 1 Application(s) Topical every 6 hours      HEALTH ISSUES - PROBLEM Dx:    83 yr old female with hx of A-fib on Xarelto s/p ablation one week ago patient reports she was laying in bed a lot since. Patient reports severe constipation started 1/9/24.    #Fecal impaction with stercoral colitis   - disimpacted in the ED and by GI on 1/18, S/p large BM 1/24  - K corrected, Ca supplementation given  - c/w po laxatives  - encouraged PT/OOB  - resume regular diet  - PT recommended RW and rehab vs home PT  - KUB with moderate stool burden    # Acute on chronic Afb   - pt failed ablation performed on 1/10/23  - s/p multiple DCCV failed, s/p cryoablation on 1/9/24 - back in afib now   - Was hypotensive and thought to be related to hypotensive, but still unctrolled s/p IVF  - c/w Xarelto  -Last TTE from 2022 shows normal LVEF  -TTE - LVEF 57%, severe MVR, Mod TR  - sotalol 80mg bid, c/w cardizem 300mg qd  -Consult cardiology recs appreciate   -EKG reviewed, afib no signs of RVR    # hx of temporal arteritis   - normally on actemera and prolia at home  - recommended that she f/u with her rheumatologist on whether to continue both medications as constipation and hypocalcemia are SE of both  - please make sure to put this info in her discharge paperwork as per pt request  - calcium replenished    # Urinary Retention due to fecal impaction   - TOV today    # DVT Ppx   - on Xarelto    Full Code    Healthcare maintanence on discharge : Colonoscopy OP follow up with Dr. Hunt. Needs referral to structural heart dz on discharge    Patient ambulated well, pO2 ~95% with me present, does not appear in distress. However, she states she feels short of breath. explained her pulse oxygen is fine, agreeable for dc in am

## 2024-01-28 NOTE — DISCHARGE NOTE PROVIDER - DATE OF DISCHARGE SERVICE:
28-Jan-2024 Additional Notes: Patient consent was obtained to proceed with the visit and recommended plan of care after discussion of all risks and benefits, including the risks of COVID-19 exposure. Detail Level: Simple 30-Jan-2024

## 2024-01-28 NOTE — DISCHARGE NOTE PROVIDER - NSDCMRMEDTOKEN_GEN_ALL_CORE_FT
Actemra 162 mg/0.9 mL subcutaneous solution: subcutaneous every 3 weeks  dilTIAZem 300 mg/24 hours oral capsule, extended release: 1 cap(s) orally once a day  hydroCHLOROthiazide 12.5 mg oral tablet: 1 tablet orally once a day  hydrocortisone 2.5% topical cream: Apply topically to affected area as needed for  itching  olmesartan 20 mg oral tablet: 1 orally  pantoprazole 40 mg oral delayed release tablet: 1 tab(s) orally once a day (before a meal)  Prolia 60 mg/mL subcutaneous solution: subcutaneous every 6 months  senna leaf extract oral tablet: 2 tab(s) orally once a day (at bedtime)  sotalol 80 mg oral tablet: 1 tab(s) orally 2 times a day  Xarelto 20 mg oral tablet: 1 tab(s) orally once a day (in the evening)   Actemra 162 mg/0.9 mL subcutaneous solution: subcutaneous every 3 weeks  dilTIAZem 300 mg/24 hours oral capsule, extended release: 1 cap(s) orally once a day  hydroCHLOROthiazide 12.5 mg oral tablet: 1 tablet orally once a day  hydrocortisone 2.5% topical cream: Apply topically to affected area as needed for  itching  olmesartan 20 mg oral tablet: 1 orally  pantoprazole 40 mg oral delayed release tablet: 1 tab(s) orally once a day (before a meal)  polyethylene glycol 3350 oral powder for reconstitution: 17 gram(s) orally once a day as needed for  constipation  Prolia 60 mg/mL subcutaneous solution: subcutaneous every 6 months  senna leaf extract oral tablet: 2 tab(s) orally once a day (at bedtime)  sotalol 80 mg oral tablet: 1 tab(s) orally 2 times a day  Xarelto 20 mg oral tablet: 1 tab(s) orally once a day (in the evening)   Actemra 162 mg/0.9 mL subcutaneous solution: subcutaneous every 3 weeks  dilTIAZem 300 mg/24 hours oral capsule, extended release: 1 cap(s) orally once a day  Dulcolax Laxative 5 mg oral delayed release tablet: 1 tab(s) orally once a day as needed for  constipation  hydroCHLOROthiazide 12.5 mg oral tablet: 1 tablet orally once a day  hydrocortisone 2.5% topical cream: Apply topically to affected area as needed for  itching  olmesartan 20 mg oral tablet: 1 orally  pantoprazole 40 mg oral delayed release tablet: 1 tab(s) orally once a day (before a meal)  polyethylene glycol 3350 oral powder for reconstitution: 17 gram(s) orally once a day as needed for  constipation  Prolia 60 mg/mL subcutaneous solution: subcutaneous every 6 months  senna leaf extract oral tablet: 2 tab(s) orally once a day (at bedtime)  sotalol 80 mg oral tablet: 1 tab(s) orally 2 times a day  Xarelto 20 mg oral tablet: 1 tab(s) orally once a day (in the evening)

## 2024-01-28 NOTE — DISCHARGE NOTE PROVIDER - PROVIDER TOKENS
PROVIDER:[TOKEN:[[id number]]],PROVIDER:[TOKEN:[[id number]]],PROVIDER:[TOKEN:[[id number]]] PROVIDER:[TOKEN:[[id number]]],PROVIDER:[TOKEN:[[id number]]],PROVIDER:[TOKEN:[[id number]],SCHEDULEDAPPT:[02/01/2024],SCHEDULEDAPPTTIME:[12:30 PM]] PROVIDER:[TOKEN:[[id number]]],PROVIDER:[TOKEN:[[id number]]],PROVIDER:[TOKEN:[[id number]],SCHEDULEDAPPT:[02/01/2024],SCHEDULEDAPPTTIME:[12:30 PM]],PROVIDER:[TOKEN:[[id number]]]

## 2024-01-28 NOTE — DISCHARGE NOTE PROVIDER - CARE PROVIDERS DIRECT ADDRESSES
,DirectAddress_Unknown,[email or web address] ,DirectAddress_Unknown,[email or web address]

## 2024-01-28 NOTE — DISCHARGE NOTE PROVIDER - NSDCCPCAREPLAN_GEN_ALL_CORE_FT
PRINCIPAL DISCHARGE DIAGNOSIS  Diagnosis: Stercoral colitis  Assessment and Plan of Treatment: You were admitted for stercoral colitis / constipation. You were treated with medication, laxatives and manual disimpaction while in the hospital. You had a Bowel movement and your symptoms resolved. While here, you had episodes of afib with RVR and Cardizem was added to your regimen. Please continue taking all medications as prescribed. Please follow up with your cardiologist and your primary care physician regarding this hospitalization.      SECONDARY DISCHARGE DIAGNOSES  Diagnosis: Constipation  Assessment and Plan of Treatment:     Diagnosis: Afib  Assessment and Plan of Treatment:      PRINCIPAL DISCHARGE DIAGNOSIS  Diagnosis: Stercoral colitis  Assessment and Plan of Treatment: You were admitted for stercoral colitis / constipation. You were treated with medication, laxatives and manual disimpaction while in the hospital. You had a Bowel movement and your symptoms resolved. While here, you had episodes of afib with RVR and Cardizem was added to your regimen. Please continue taking all medications as prescribed. Please follow up with your cardiologist and your primary care physician regarding this hospitalization. You can follow up with the structural heart team at [address] [place] [address] on 2/1/24 at 12:30pm      SECONDARY DISCHARGE DIAGNOSES  Diagnosis: Constipation  Assessment and Plan of Treatment:     Diagnosis: Afib  Assessment and Plan of Treatment:      PRINCIPAL DISCHARGE DIAGNOSIS  Diagnosis: Stercoral colitis  Assessment and Plan of Treatment: You were admitted for stercoral colitis / constipation. You were treated with medication, laxatives and manual disimpaction while in the hospital. You had a Bowel movement and your symptoms resolved. While here, you had episodes of afib with RVR and Cardizem was added to your regimen. Please continue taking all medications as prescribed. Please follow up with your cardiologist and your primary care physician regarding this hospitalization. You can follow up with the structural heart team at [address] [place] [address] on 2/1/24 at 12:30pm  Please follow up with Dr. Hunt for an outpatient colonoscopy        SECONDARY DISCHARGE DIAGNOSES  Diagnosis: Constipation  Assessment and Plan of Treatment:     Diagnosis: Afib  Assessment and Plan of Treatment:      PRINCIPAL DISCHARGE DIAGNOSIS  Diagnosis: Stercoral colitis  Assessment and Plan of Treatment: You were admitted for stercoral colitis / constipation. You were treated with medication, laxatives and manual disimpaction while in the hospital. You had a Bowel movement and your symptoms resolved. Please continue taking Senna everynight. Take Miralax once a day as needed for constipation. If you still feel constipated, please take Dulcolax in addition as needed once per day. While here, you had episodes of afib with RVR and Cardizem was added to your regimen. Please continue taking all medications as prescribed. Please follow up with your cardiologist and your primary care physician regarding this hospitalization. You can follow up with the structural heart team at [address] [place] [address] on 2/1/24 at 12:30pm  Please follow up with Dr. Hunt for an outpatient colonoscopy  Please follow up with your Rheumatologist regarding Actemera and prolia at home.         SECONDARY DISCHARGE DIAGNOSES  Diagnosis: Constipation  Assessment and Plan of Treatment:     Diagnosis: Afib  Assessment and Plan of Treatment:

## 2024-01-29 LAB
ANION GAP SERPL CALC-SCNC: 9 MMOL/L — SIGNIFICANT CHANGE UP (ref 7–14)
BUN SERPL-MCNC: 14 MG/DL — SIGNIFICANT CHANGE UP (ref 10–20)
CALCIUM SERPL-MCNC: 9 MG/DL — SIGNIFICANT CHANGE UP (ref 8.4–10.5)
CHLORIDE SERPL-SCNC: 91 MMOL/L — LOW (ref 98–110)
CO2 SERPL-SCNC: 31 MMOL/L — SIGNIFICANT CHANGE UP (ref 17–32)
CREAT SERPL-MCNC: 0.6 MG/DL — LOW (ref 0.7–1.5)
EGFR: 90 ML/MIN/1.73M2 — SIGNIFICANT CHANGE UP
GLUCOSE SERPL-MCNC: 142 MG/DL — HIGH (ref 70–99)
HCT VFR BLD CALC: 30.9 % — LOW (ref 37–47)
HGB BLD-MCNC: 10.7 G/DL — LOW (ref 12–16)
MCHC RBC-ENTMCNC: 32.2 PG — HIGH (ref 27–31)
MCHC RBC-ENTMCNC: 34.6 G/DL — SIGNIFICANT CHANGE UP (ref 32–37)
MCV RBC AUTO: 93.1 FL — SIGNIFICANT CHANGE UP (ref 81–99)
NRBC # BLD: 0 /100 WBCS — SIGNIFICANT CHANGE UP (ref 0–0)
PLATELET # BLD AUTO: 235 K/UL — SIGNIFICANT CHANGE UP (ref 130–400)
PMV BLD: 8.4 FL — SIGNIFICANT CHANGE UP (ref 7.4–10.4)
POTASSIUM SERPL-MCNC: 4.1 MMOL/L — SIGNIFICANT CHANGE UP (ref 3.5–5)
POTASSIUM SERPL-SCNC: 4.1 MMOL/L — SIGNIFICANT CHANGE UP (ref 3.5–5)
RBC # BLD: 3.32 M/UL — LOW (ref 4.2–5.4)
RBC # FLD: 13.4 % — SIGNIFICANT CHANGE UP (ref 11.5–14.5)
SODIUM SERPL-SCNC: 131 MMOL/L — LOW (ref 135–146)
WBC # BLD: 3.77 K/UL — LOW (ref 4.8–10.8)
WBC # FLD AUTO: 3.77 K/UL — LOW (ref 4.8–10.8)

## 2024-01-29 PROCEDURE — 93010 ELECTROCARDIOGRAM REPORT: CPT | Mod: 77

## 2024-01-29 PROCEDURE — 71045 X-RAY EXAM CHEST 1 VIEW: CPT | Mod: 26

## 2024-01-29 PROCEDURE — 93010 ELECTROCARDIOGRAM REPORT: CPT

## 2024-01-29 PROCEDURE — 99232 SBSQ HOSP IP/OBS MODERATE 35: CPT

## 2024-01-29 RX ORDER — FUROSEMIDE 40 MG
40 TABLET ORAL ONCE
Refills: 0 | Status: DISCONTINUED | OUTPATIENT
Start: 2024-01-29 | End: 2024-01-29

## 2024-01-29 RX ORDER — LACTULOSE 10 G/15ML
10 SOLUTION ORAL ONCE
Refills: 0 | Status: COMPLETED | OUTPATIENT
Start: 2024-01-29 | End: 2024-01-29

## 2024-01-29 RX ORDER — POLYETHYLENE GLYCOL 3350 17 G/17G
17 POWDER, FOR SOLUTION ORAL ONCE
Refills: 0 | Status: COMPLETED | OUTPATIENT
Start: 2024-01-29 | End: 2024-01-29

## 2024-01-29 RX ORDER — SODIUM CHLORIDE 9 MG/ML
500 INJECTION, SOLUTION INTRAVENOUS
Refills: 0 | Status: DISCONTINUED | OUTPATIENT
Start: 2024-01-29 | End: 2024-01-30

## 2024-01-29 RX ADMIN — Medication 1 APPLICATION(S): at 18:00

## 2024-01-29 RX ADMIN — POLYETHYLENE GLYCOL 3350 17 GRAM(S): 17 POWDER, FOR SOLUTION ORAL at 12:02

## 2024-01-29 RX ADMIN — Medication 300 MILLIGRAM(S): at 05:32

## 2024-01-29 RX ADMIN — SENNA PLUS 2 TABLET(S): 8.6 TABLET ORAL at 21:13

## 2024-01-29 RX ADMIN — Medication 80 MILLIGRAM(S): at 05:31

## 2024-01-29 RX ADMIN — POLYETHYLENE GLYCOL 3350 17 GRAM(S): 17 POWDER, FOR SOLUTION ORAL at 17:57

## 2024-01-29 RX ADMIN — RIVAROXABAN 20 MILLIGRAM(S): KIT at 17:59

## 2024-01-29 RX ADMIN — PANTOPRAZOLE SODIUM 40 MILLIGRAM(S): 20 TABLET, DELAYED RELEASE ORAL at 05:38

## 2024-01-29 RX ADMIN — Medication 1 APPLICATION(S): at 12:02

## 2024-01-29 RX ADMIN — LOSARTAN POTASSIUM 50 MILLIGRAM(S): 100 TABLET, FILM COATED ORAL at 05:32

## 2024-01-29 RX ADMIN — Medication 80 MILLIGRAM(S): at 18:00

## 2024-01-29 RX ADMIN — CHLORHEXIDINE GLUCONATE 1 APPLICATION(S): 213 SOLUTION TOPICAL at 05:31

## 2024-01-29 RX ADMIN — LACTULOSE 10 GRAM(S): 10 SOLUTION ORAL at 18:45

## 2024-01-29 RX ADMIN — SODIUM CHLORIDE 1000 MILLILITER(S): 9 INJECTION, SOLUTION INTRAVENOUS at 13:14

## 2024-01-29 NOTE — DIETITIAN INITIAL EVALUATION ADULT - PERTINENT LABORATORY DATA
01-29    131<L>  |  91<L>  |  14  ----------------------------<  142<H>  4.1   |  31  |  0.6<L>    Ca    9.0      29 Jan 2024 08:46  Mg     2.1     01-28

## 2024-01-29 NOTE — PROGRESS NOTE ADULT - SUBJECTIVE AND OBJECTIVE BOX
Progress note    INTERVAL HPI/OVERNIGHT EVENTS:    Patient seen and examined at bedside. She is on room air but admits to feeling short of breaht.      REVIEW OF SYSTEMS:  All other 13 Review of systems were reviewed and are negative    FAMILY HISTORY:    T(C): 36.1 (01-29-24 @ 05:00), Max: 36.1 (01-29-24 @ 05:00)  HR: 76 (01-29-24 @ 05:00) (76 - 99)  BP: 105/50 (01-29-24 @ 05:00) (103/50 - 115/64)  RR: 18 (01-29-24 @ 05:00) (18 - 18)  SpO2: 93% (01-29-24 @ 05:00) (92% - 95%)  Wt(kg): --Vital Signs Last 24 Hrs  T(C): 36.1 (29 Jan 2024 05:00), Max: 36.1 (29 Jan 2024 05:00)  T(F): 96.9 (29 Jan 2024 05:00), Max: 96.9 (29 Jan 2024 05:00)  HR: 76 (29 Jan 2024 05:00) (76 - 99)  BP: 105/50 (29 Jan 2024 05:00) (103/50 - 115/64)  BP(mean): --  RR: 18 (29 Jan 2024 05:00) (18 - 18)  SpO2: 93% (29 Jan 2024 05:00) (92% - 95%)    Parameters below as of 29 Jan 2024 05:00  Patient On (Oxygen Delivery Method): room air      penicillin V potassium (Unknown)  naproxen (Unknown)  Cipro (Sedation/Somnol)  amoxicillin (Other (Mild to Mod))  sulfonamides (Unknown)  cephalexin (Unknown)      PHYSICAL EXAM:    GEN: No acute distress  LUNGS: bilateral mild crackles on lower lobes  HEART: S1/S2 present. irregularly irregular .   ABD: Soft, non-tender, non-distended. Bowel sounds present  EXT: +1 bilateral jim  NEURO: AAOX3  SKIN: intact     Consultant(s) Notes Reviewed:  [x ] YES  [ ] NO  Care Discussed with Consultants/Other Providers [ x] YES  [ ] NO    LABS:      RADIOLOGY & ADDITIONAL TESTS:    Imaging Personally Reviewed:  [ ] YES  [ ] NO  acetaminophen     Tablet .. 650 milliGRAM(s) Oral every 6 hours PRN  artificial  tears Solution 1 Drop(s) Both EYES four times a day PRN  chlorhexidine 2% Cloths 1 Application(s) Topical <User Schedule>  diltiazem    milliGRAM(s) Oral daily  furosemide   Injectable 40 milliGRAM(s) IV Push once  hydrochlorothiazide 12.5 milliGRAM(s) Oral daily  influenza  Vaccine (HIGH DOSE) 0.7 milliLiter(s) IntraMuscular once  losartan 50 milliGRAM(s) Oral daily  magnesium hydroxide Suspension 30 milliLiter(s) Oral daily PRN  melatonin 3 milliGRAM(s) Oral at bedtime PRN  pantoprazole    Tablet 40 milliGRAM(s) Oral before breakfast  polyethylene glycol 3350 17 Gram(s) Oral daily  rivaroxaban 20 milliGRAM(s) Oral with dinner  senna 2 Tablet(s) Oral at bedtime  sodium chloride 0.65% Nasal 1 Spray(s) Both Nostrils every 8 hours PRN  sotalol. 80 milliGRAM(s) Oral every 12 hours  vitamin A &amp; D Ointment 1 Application(s) Topical every 6 hours      HEALTH ISSUES - PROBLEM Dx:           Progress note    INTERVAL HPI/OVERNIGHT EVENTS:    Patient seen and examined at bedside. She is on room air but admits to feeling short of breaht.      REVIEW OF SYSTEMS:  All other 13 Review of systems were reviewed and are negative    FAMILY HISTORY:    T(C): 36.1 (01-29-24 @ 05:00), Max: 36.1 (01-29-24 @ 05:00)  HR: 76 (01-29-24 @ 05:00) (76 - 99)  BP: 105/50 (01-29-24 @ 05:00) (103/50 - 115/64)  RR: 18 (01-29-24 @ 05:00) (18 - 18)  SpO2: 93% (01-29-24 @ 05:00) (92% - 95%)  Wt(kg): --Vital Signs Last 24 Hrs  T(C): 36.1 (29 Jan 2024 05:00), Max: 36.1 (29 Jan 2024 05:00)  T(F): 96.9 (29 Jan 2024 05:00), Max: 96.9 (29 Jan 2024 05:00)  HR: 76 (29 Jan 2024 05:00) (76 - 99)  BP: 105/50 (29 Jan 2024 05:00) (103/50 - 115/64)  BP(mean): --  RR: 18 (29 Jan 2024 05:00) (18 - 18)  SpO2: 93% (29 Jan 2024 05:00) (92% - 95%)    Parameters below as of 29 Jan 2024 05:00  Patient On (Oxygen Delivery Method): room air      penicillin V potassium (Unknown)  naproxen (Unknown)  Cipro (Sedation/Somnol)  amoxicillin (Other (Mild to Mod))  sulfonamides (Unknown)  cephalexin (Unknown)      PHYSICAL EXAM:    GEN: No acute distress  LUNGS: bilateral mild crackles on lower lobes  HEART: S1/S2 present. irregularly irregular .   ABD: Soft, non-tender, non-distended. Bowel sounds present  EXT: +1 bilateral jim  NEURO: AAOX3  SKIN: intact     Consultant(s) Notes Reviewed:  [x ] YES  [ ] NO  Care Discussed with Consultants/Other Providers [ x] YES  [ ] NO    LABS:      RADIOLOGY & ADDITIONAL TESTS:    Imaging Personally Reviewed:  [ ] YES  [ ] NO  acetaminophen     Tablet .. 650 milliGRAM(s) Oral every 6 hours PRN  artificial  tears Solution 1 Drop(s) Both EYES four times a day PRN  chlorhexidine 2% Cloths 1 Application(s) Topical <User Schedule>  diltiazem    milliGRAM(s) Oral daily  furosemide   Injectable 40 milliGRAM(s) IV Push once  hydrochlorothiazide 12.5 milliGRAM(s) Oral daily  influenza  Vaccine (HIGH DOSE) 0.7 milliLiter(s) IntraMuscular once  losartan 50 milliGRAM(s) Oral daily  magnesium hydroxide Suspension 30 milliLiter(s) Oral daily PRN  melatonin 3 milliGRAM(s) Oral at bedtime PRN  pantoprazole    Tablet 40 milliGRAM(s) Oral before breakfast  polyethylene glycol 3350 17 Gram(s) Oral daily  rivaroxaban 20 milliGRAM(s) Oral with dinner  senna 2 Tablet(s) Oral at bedtime  sodium chloride 0.65% Nasal 1 Spray(s) Both Nostrils every 8 hours PRN  sotalol. 80 milliGRAM(s) Oral every 12 hours  vitamin A &amp; D Ointment 1 Application(s) Topical every 6 hours      HEALTH ISSUES - PROBLEM Dx:    83 yr old female with hx of A-fib on Xarelto s/p ablation one week ago patient reports she was laying in bed a lot since. Patient reports severe constipation started 1/9/24.    #Fecal impaction with stercoral colitis   - disimpacted in the ED and by GI on 1/18, S/p large BM 1/24  - K corrected, Ca supplementation given  - c/w po laxatives  - encouraged PT/OOB  - resume regular diet  - PT recommended RW and rehab vs home PT  - KUB with moderate stool burden    # Acute on chronic Afb   # Severe MVR  - pt failed ablation performed on 1/10/23  - s/p multiple DCCV failed, s/p cryoablation on 1/9/24 - back in afib now   - Was hypotensive and thought to be related to hypotensive, but still unctrolled s/p IVF  - c/w Xarelto  -TTE - LVEF 57%, severe MVR, Mod TR  - sotalol 80mg bid, c/w cardizem 300mg qd  -Consult cardiology recs appreciate   -EKG reviewed, afib no signs of RVR  -Structural evaluation on 2/1/24 in dc summary  -Give furosemide tonight and tomorrow am given crackles of PE    # hx of temporal arteritis   - normally on actemera and prolia at home  - recommended that she f/u with her rheumatologist on whether to continue both medications as constipation and hypocalcemia are SE of both  - please make sure to put this info in her discharge paperwork as per pt request  - calcium replenished    # Urinary Retention due to fecal impaction   - TOV today    # DVT Ppx   - on Xarelto    Full Code    Healthcare maintanence on discharge : Colonoscopy OP follow up with Dr. Hunt. Needs referral to structural heart dz on discharge  prepare for dc in am

## 2024-01-29 NOTE — DIETITIAN INITIAL EVALUATION ADULT - ORAL INTAKE PTA/DIET HISTORY
Patient reports she should be on a high fiber diet. She normally is with good appetite and PO intake with at least 1 BM per day. She reports UBW: 140 lbs. NKFA, no food intolerances reported.     Patient reports decreased appetite this morning and is concerned that she has not had a recent BM

## 2024-01-29 NOTE — DIETITIAN INITIAL EVALUATION ADULT - COLLABORATION WITH OTHER PROVIDERS
Interventions: meals and snacks, medical food supplements, coordination of care  Monitoring/Evaluation: energy intake, weight, labs, skin status, NFPE, BM, GI s/s

## 2024-01-29 NOTE — DIETITIAN INITIAL EVALUATION ADULT - OTHER CALCULATIONS
estimated needs with consideration for age, acuity of illness - patient's reported  lbs  estimated fluid needs: 1500 mL fluid restriction currently

## 2024-01-29 NOTE — DIETITIAN INITIAL EVALUATION ADULT - OTHER INFO
Patient is a 83 yr old female with hx of A-fib on Xarelto s/p ablation one week ago patient reports she was laying in bed a lot since. Patient reports severe constipation started 1/9/24.  Fecal impaction with stercoral colitis - disimpacted in the ED and by GI on 1/18, s/p large BM 1/24; Acute on chronic Afib; Severe MVR; hx of temporal arteritis; Urinary retention due to fecal impaction;

## 2024-01-29 NOTE — DIETITIAN INITIAL EVALUATION ADULT - PERTINENT MEDS FT
MEDICATIONS  (STANDING):  chlorhexidine 2% Cloths 1 Application(s) Topical <User Schedule>  diltiazem    milliGRAM(s) Oral daily  hydrochlorothiazide 12.5 milliGRAM(s) Oral daily  influenza  Vaccine (HIGH DOSE) 0.7 milliLiter(s) IntraMuscular once  lactated ringers. 500 milliLiter(s) (1000 mL/Hr) IV Continuous <Continuous>  losartan 50 milliGRAM(s) Oral daily  pantoprazole    Tablet 40 milliGRAM(s) Oral before breakfast  polyethylene glycol 3350 17 Gram(s) Oral daily  rivaroxaban 20 milliGRAM(s) Oral with dinner  senna 2 Tablet(s) Oral at bedtime  sotalol. 80 milliGRAM(s) Oral every 12 hours  vitamin A &amp; D Ointment 1 Application(s) Topical every 6 hours    MEDICATIONS  (PRN):  acetaminophen     Tablet .. 650 milliGRAM(s) Oral every 6 hours PRN Temp greater or equal to 38C (100.4F), Mild Pain (1 - 3)  artificial  tears Solution 1 Drop(s) Both EYES four times a day PRN Dry Eyes  magnesium hydroxide Suspension 30 milliLiter(s) Oral daily PRN Constipation  melatonin 3 milliGRAM(s) Oral at bedtime PRN Insomnia  sodium chloride 0.65% Nasal 1 Spray(s) Both Nostrils every 8 hours PRN Nasal Congestion

## 2024-01-29 NOTE — DIETITIAN INITIAL EVALUATION ADULT - EDUCATION DIETARY MODIFICATIONS
Discussed importance of adequate kcal, protein intake; oral nutrition supplements and benefits; high fiber foods/(1) partially meets; needs review/practice/verbalization

## 2024-01-29 NOTE — DIETITIAN INITIAL EVALUATION ADULT - NS FNS DIET ORDER
Diet, DASH/TLC:   Sodium & Cholesterol Restricted  High Fiber  1500mL Fluid Restriction ([id number]) (01-28-24 @ 10:48) [Active]

## 2024-01-29 NOTE — DIETITIAN INITIAL EVALUATION ADULT - NS FNS REASON FOR WEIGHT CHANG
weight hx per EMR:    62.6 kg - 137.72 lbs (8/9/23)  63.5 kg - 139.7 lbs (1/10/24)    patient noted with swelling in leg - weight gain likely related to fluid retention/fluid retention

## 2024-01-29 NOTE — DIETITIAN INITIAL EVALUATION ADULT - NAME AND PHONE
Noa Candelaria, RD [phone] or via Teams    Patient is at high nutrition risk, RD to f/u in 3-5 days or PRN  Noa Candelaria, RD [phone] or via Teams    Patient is at moderate nutrition risk, RD to f/u in 5-7 days or PRN

## 2024-01-30 ENCOUNTER — TRANSCRIPTION ENCOUNTER (OUTPATIENT)
Age: 84
End: 2024-01-30

## 2024-01-30 VITALS — RESPIRATION RATE: 18 BRPM | OXYGEN SATURATION: 94 %

## 2024-01-30 LAB
ANION GAP SERPL CALC-SCNC: 8 MMOL/L — SIGNIFICANT CHANGE UP (ref 7–14)
BUN SERPL-MCNC: 13 MG/DL — SIGNIFICANT CHANGE UP (ref 10–20)
CALCIUM SERPL-MCNC: 9.6 MG/DL — SIGNIFICANT CHANGE UP (ref 8.4–10.5)
CHLORIDE SERPL-SCNC: 95 MMOL/L — LOW (ref 98–110)
CO2 SERPL-SCNC: 32 MMOL/L — SIGNIFICANT CHANGE UP (ref 17–32)
CREAT SERPL-MCNC: 0.7 MG/DL — SIGNIFICANT CHANGE UP (ref 0.7–1.5)
EGFR: 87 ML/MIN/1.73M2 — SIGNIFICANT CHANGE UP
GLUCOSE SERPL-MCNC: 105 MG/DL — HIGH (ref 70–99)
MAGNESIUM SERPL-MCNC: 2 MG/DL — SIGNIFICANT CHANGE UP (ref 1.8–2.4)
POTASSIUM SERPL-MCNC: 4.3 MMOL/L — SIGNIFICANT CHANGE UP (ref 3.5–5)
POTASSIUM SERPL-SCNC: 4.3 MMOL/L — SIGNIFICANT CHANGE UP (ref 3.5–5)
SODIUM SERPL-SCNC: 135 MMOL/L — SIGNIFICANT CHANGE UP (ref 135–146)

## 2024-01-30 PROCEDURE — 99239 HOSP IP/OBS DSCHRG MGMT >30: CPT

## 2024-01-30 RX ADMIN — LOSARTAN POTASSIUM 50 MILLIGRAM(S): 100 TABLET, FILM COATED ORAL at 05:29

## 2024-01-30 RX ADMIN — Medication 1 APPLICATION(S): at 11:19

## 2024-01-30 RX ADMIN — POLYETHYLENE GLYCOL 3350 17 GRAM(S): 17 POWDER, FOR SOLUTION ORAL at 11:19

## 2024-01-30 RX ADMIN — PANTOPRAZOLE SODIUM 40 MILLIGRAM(S): 20 TABLET, DELAYED RELEASE ORAL at 06:05

## 2024-01-30 RX ADMIN — Medication 80 MILLIGRAM(S): at 05:30

## 2024-01-30 RX ADMIN — Medication 1 APPLICATION(S): at 05:33

## 2024-01-30 RX ADMIN — Medication 300 MILLIGRAM(S): at 05:29

## 2024-01-30 RX ADMIN — CHLORHEXIDINE GLUCONATE 1 APPLICATION(S): 213 SOLUTION TOPICAL at 05:29

## 2024-01-30 NOTE — PROGRESS NOTE ADULT - SUBJECTIVE AND OBJECTIVE BOX
Progress note    INTERVAL HPI/OVERNIGHT EVENTS:    Patient seen and examined at bedside. No acute dsitress.      REVIEW OF SYSTEMS:  All other 13 Review of systems were reviewed and are negative    FAMILY HISTORY:    T(C): 36.5 (01-30-24 @ 04:40), Max: 36.7 (01-29-24 @ 20:52)  HR: 97 (01-30-24 @ 04:40) (62 - 97)  BP: 109/59 (01-30-24 @ 04:40) (87/50 - 122/56)  RR: 18 (01-30-24 @ 10:40) (18 - 18)  SpO2: 94% (01-30-24 @ 10:40) (91% - 97%)  Wt(kg): --Vital Signs Last 24 Hrs  T(C): 36.5 (30 Jan 2024 04:40), Max: 36.7 (29 Jan 2024 20:52)  T(F): 97.7 (30 Jan 2024 04:40), Max: 98.1 (29 Jan 2024 20:52)  HR: 97 (30 Jan 2024 04:40) (62 - 97)  BP: 109/59 (30 Jan 2024 04:40) (87/50 - 122/56)  BP(mean): --  RR: 18 (30 Jan 2024 10:40) (18 - 18)  SpO2: 94% (30 Jan 2024 10:40) (91% - 97%)    Parameters below as of 30 Jan 2024 10:40  Patient On (Oxygen Delivery Method): room air      penicillin V potassium (Unknown)  naproxen (Unknown)  Cipro (Sedation/Somnol)  amoxicillin (Other (Mild to Mod))  sulfonamides (Unknown)  cephalexin (Unknown)      PHYSICAL EXAM:    GEN: No acute distress  LUNGS: bilateral mild crackles on lower lobes  HEART: S1/S2 present. irregularly irregular .   ABD: Soft, non-tender, non-distended. Bowel sounds present  EXT: +1 bilateral jim  NEURO: AAOX3  SKIN: intact     Consultant(s) Notes Reviewed:  [x ] YES  [ ] NO  Care Discussed with Consultants/Other Providers [ x] YES  [ ] NO    LABS:      RADIOLOGY & ADDITIONAL TESTS:    Imaging Personally Reviewed:  [ ] YES  [ ] NO  acetaminophen     Tablet .. 650 milliGRAM(s) Oral every 6 hours PRN  artificial  tears Solution 1 Drop(s) Both EYES four times a day PRN  chlorhexidine 2% Cloths 1 Application(s) Topical <User Schedule>  diltiazem    milliGRAM(s) Oral daily  hydrochlorothiazide 12.5 milliGRAM(s) Oral daily  influenza  Vaccine (HIGH DOSE) 0.7 milliLiter(s) IntraMuscular once  lactated ringers. 500 milliLiter(s) IV Continuous <Continuous>  losartan 50 milliGRAM(s) Oral daily  magnesium hydroxide Suspension 30 milliLiter(s) Oral daily PRN  melatonin 3 milliGRAM(s) Oral at bedtime PRN  pantoprazole    Tablet 40 milliGRAM(s) Oral before breakfast  polyethylene glycol 3350 17 Gram(s) Oral daily  rivaroxaban 20 milliGRAM(s) Oral with dinner  senna 2 Tablet(s) Oral at bedtime  sodium chloride 0.65% Nasal 1 Spray(s) Both Nostrils every 8 hours PRN  sotalol. 80 milliGRAM(s) Oral every 12 hours  vitamin A &amp; D Ointment 1 Application(s) Topical every 6 hours      HEALTH ISSUES - PROBLEM Dx:    83 yr old female with hx of A-fib on Xarelto s/p ablation one week ago patient reports she was laying in bed a lot since. Patient reports severe constipation started 1/9/24.    #Fecal impaction with stercoral colitis   - disimpacted in the ED and by GI on 1/18, S/p large BM 1/24  - K corrected, Ca supplementation given  - c/w po laxatives  - encouraged PT/OOB  - resume regular diet  - PT recommended RW and rehab vs home PT  - KUB with moderate stool burden    # Acute on chronic Afb   # Severe MVR  - pt failed ablation performed on 1/10/23  - s/p multiple DCCV failed, s/p cryoablation on 1/9/24 - back in afib now   - Was hypotensive and thought to be related to hypotensive, but still unctrolled s/p IVF  - c/w Xarelto  -TTE - LVEF 57%, severe MVR, Mod TR  - sotalol 80mg bid, c/w cardizem 300mg qd  -Consult cardiology recs appreciate   -EKG reviewed, afib no signs of RVR  -Structural evaluation on 2/1/24 in dc summary    # hx of temporal arteritis   - normally on actemera and prolia at home  - recommended that she f/u with her rheumatologist on whether to continue both medications as constipation and hypocalcemia are SE of both  - please make sure to put this info in her discharge paperwork as per pt request  - calcium replenished    # Urinary Retention due to fecal impaction   - TOV today    # DVT Ppx   - on Xarelto    Full Code    Healthcare maintanence on discharge : Colonoscopy OP follow up with Dr. Hunt. Needs referral to structural heart dz on discharge  dc today

## 2024-01-30 NOTE — DISCHARGE NOTE NURSING/CASE MANAGEMENT/SOCIAL WORK - PATIENT PORTAL LINK FT
You can access the FollowMyHealth Patient Portal offered by [facility] by registering at the following website: [email or web address]. By joining [employer]’s FollowMyHealth portal, you will also be able to view your health information using other applications (apps) compatible with our system.

## 2024-02-01 ENCOUNTER — RESULT CHARGE (OUTPATIENT)
Age: 84
End: 2024-02-01

## 2024-02-01 ENCOUNTER — APPOINTMENT (OUTPATIENT)
Dept: CARDIOLOGY | Facility: CLINIC | Age: 84
End: 2024-02-01
Payer: MEDICARE

## 2024-02-01 ENCOUNTER — LABORATORY RESULT (OUTPATIENT)
Age: 84
End: 2024-02-01

## 2024-02-01 ENCOUNTER — APPOINTMENT (OUTPATIENT)
Dept: CARDIOTHORACIC SURGERY | Facility: CLINIC | Age: 84
End: 2024-02-01

## 2024-02-01 VITALS
TEMPERATURE: 98 F | OXYGEN SATURATION: 90 % | DIASTOLIC BLOOD PRESSURE: 62 MMHG | RESPIRATION RATE: 12 BRPM | HEIGHT: 61 IN | HEART RATE: 66 BPM | SYSTOLIC BLOOD PRESSURE: 99 MMHG | BODY MASS INDEX: 27.19 KG/M2 | WEIGHT: 144 LBS

## 2024-02-01 DIAGNOSIS — R30.0 DYSURIA: ICD-10-CM

## 2024-02-01 PROCEDURE — 99204 OFFICE O/P NEW MOD 45 MIN: CPT

## 2024-02-01 PROCEDURE — 93000 ELECTROCARDIOGRAM COMPLETE: CPT

## 2024-02-01 RX ORDER — NITROFURANTOIN (MONOHYDRATE/MACROCRYSTALS) 25; 75 MG/1; MG/1
100 CAPSULE ORAL TWICE DAILY
Qty: 14 | Refills: 0 | Status: DISCONTINUED | COMMUNITY
Start: 2023-10-30 | End: 2024-02-01

## 2024-02-01 RX ORDER — HYDROCHLOROTHIAZIDE 12.5 MG/1
12.5 TABLET ORAL
Qty: 90 | Refills: 0 | Status: DISCONTINUED | COMMUNITY
Start: 2023-08-16 | End: 2024-02-01

## 2024-02-01 RX ORDER — OLMESARTAN MEDOXOMIL 20 MG/1
20 TABLET, FILM COATED ORAL
Qty: 90 | Refills: 0 | Status: DISCONTINUED | COMMUNITY
Start: 2023-08-16 | End: 2024-02-01

## 2024-02-01 NOTE — REVIEW OF SYSTEMS
[Feeling Fatigued] : feeling fatigued [SOB] : shortness of breath [Dyspnea on exertion] : dyspnea during exertion [Lower Ext Edema] : lower extremity edema [Orthopnea] : orthopnea [Negative] : Heme/Lymph

## 2024-02-01 NOTE — REASON FOR VISIT
[FreeTextEntry1] : Mitral Regurgitation [Structural Heart and Valve Disease] : structural heart and valve disease [Follow-Up: _____] : a [unfilled] follow-up visit

## 2024-02-01 NOTE — HISTORY OF PRESENT ILLNESS
[FreeTextEntry1] : Ms. GRACIELA GUEVARA 83 year F arrives  today for evaluation of their Severe Mitral Regurgitation. Patient PMH include Afib X 5 Cardioversion last 8/2023, all failed and failed Ablation 12/2023, HTN, Leukopenia Osteopenia. Symptoms include SOB and LE Edema. NYHA class III. Here for hospital follow up for her severe mitral regurgitation.  Their healthcare team includes the following PMD: Dr. Santana  Cardio: Dr. Dumont  EP: Dr. Lester Hemtologist: Dr. Pérez   NYHA class Pt lives home with ?aide Denies family history of CAD Lives alone, no aide- has been independent  Ambulates with cane   5 Meter walk                                                  1.                                                                            Frailty:    2.                                                                           Right:  3.                                                                           Left:

## 2024-02-01 NOTE — PHYSICAL EXAM
[Well Developed] : well developed [Well Nourished] : well nourished [Clear Lung Fields] : clear lung fields [Edema ___] : edema [unfilled] [Normal] : no rash, no skin lesions

## 2024-02-02 ENCOUNTER — APPOINTMENT (OUTPATIENT)
Dept: CARE COORDINATION | Facility: HOME HEALTH | Age: 84
End: 2024-02-02
Payer: MEDICARE

## 2024-02-02 ENCOUNTER — INPATIENT (INPATIENT)
Facility: HOSPITAL | Age: 84
LOS: 5 days | Discharge: HOME HEALTH | DRG: 308 | End: 2024-02-08
Attending: STUDENT IN AN ORGANIZED HEALTH CARE EDUCATION/TRAINING PROGRAM | Admitting: INTERNAL MEDICINE
Payer: MEDICARE

## 2024-02-02 VITALS
HEIGHT: 60 IN | HEART RATE: 48 BPM | OXYGEN SATURATION: 99 % | DIASTOLIC BLOOD PRESSURE: 37 MMHG | WEIGHT: 139.99 LBS | SYSTOLIC BLOOD PRESSURE: 73 MMHG | TEMPERATURE: 98 F | RESPIRATION RATE: 18 BRPM

## 2024-02-02 DIAGNOSIS — I95.9 HYPOTENSION, UNSPECIFIED: ICD-10-CM

## 2024-02-02 DIAGNOSIS — K59.09 OTHER CONSTIPATION: ICD-10-CM

## 2024-02-02 DIAGNOSIS — Z98.49 CATARACT EXTRACTION STATUS, UNSPECIFIED EYE: Chronic | ICD-10-CM

## 2024-02-02 LAB
ALBUMIN SERPL ELPH-MCNC: 3.8 G/DL — SIGNIFICANT CHANGE UP (ref 3.5–5.2)
ALP SERPL-CCNC: 73 U/L — SIGNIFICANT CHANGE UP (ref 30–115)
ALT FLD-CCNC: 26 U/L — SIGNIFICANT CHANGE UP (ref 0–41)
ANION GAP SERPL CALC-SCNC: 11 MMOL/L — SIGNIFICANT CHANGE UP (ref 7–14)
APPEARANCE UR: ABNORMAL
AST SERPL-CCNC: 17 U/L — SIGNIFICANT CHANGE UP (ref 0–41)
BACTERIA # UR AUTO: ABNORMAL /HPF
BASOPHILS # BLD AUTO: 0.05 K/UL — SIGNIFICANT CHANGE UP (ref 0–0.2)
BASOPHILS NFR BLD AUTO: 1 % — SIGNIFICANT CHANGE UP (ref 0–1)
BILIRUB SERPL-MCNC: 0.4 MG/DL — SIGNIFICANT CHANGE UP (ref 0.2–1.2)
BILIRUB UR-MCNC: NEGATIVE — SIGNIFICANT CHANGE UP
BUN SERPL-MCNC: 25 MG/DL — HIGH (ref 10–20)
CALCIUM SERPL-MCNC: 9.2 MG/DL — SIGNIFICANT CHANGE UP (ref 8.4–10.5)
CAST: 25 /LPF — HIGH (ref 0–4)
CHLORIDE SERPL-SCNC: 92 MMOL/L — LOW (ref 98–110)
CO2 SERPL-SCNC: 29 MMOL/L — SIGNIFICANT CHANGE UP (ref 17–32)
COLOR SPEC: YELLOW — SIGNIFICANT CHANGE UP
CREAT ?TM UR-MCNC: 60 MG/DL — SIGNIFICANT CHANGE UP
CREAT SERPL-MCNC: 1 MG/DL — SIGNIFICANT CHANGE UP (ref 0.7–1.5)
DIFF PNL FLD: ABNORMAL
EGFR: 57 ML/MIN/1.73M2 — LOW
EOSINOPHIL # BLD AUTO: 0.1 K/UL — SIGNIFICANT CHANGE UP (ref 0–0.7)
EOSINOPHIL NFR BLD AUTO: 1.9 % — SIGNIFICANT CHANGE UP (ref 0–8)
FLUAV AG NPH QL: SIGNIFICANT CHANGE UP
FLUBV AG NPH QL: SIGNIFICANT CHANGE UP
GLUCOSE SERPL-MCNC: 110 MG/DL — HIGH (ref 70–99)
GLUCOSE UR QL: NEGATIVE MG/DL — SIGNIFICANT CHANGE UP
HCT VFR BLD CALC: 32.5 % — LOW (ref 37–47)
HGB BLD-MCNC: 10.9 G/DL — LOW (ref 12–16)
IMM GRANULOCYTES NFR BLD AUTO: 0.4 % — HIGH (ref 0.1–0.3)
KETONES UR-MCNC: 15 MG/DL
LEUKOCYTE ESTERASE UR-ACNC: ABNORMAL
LYMPHOCYTES # BLD AUTO: 0.87 K/UL — LOW (ref 1.2–3.4)
LYMPHOCYTES # BLD AUTO: 16.6 % — LOW (ref 20.5–51.1)
MAGNESIUM SERPL-MCNC: 1.9 MG/DL — SIGNIFICANT CHANGE UP (ref 1.8–2.4)
MCHC RBC-ENTMCNC: 32.3 PG — HIGH (ref 27–31)
MCHC RBC-ENTMCNC: 33.5 G/DL — SIGNIFICANT CHANGE UP (ref 32–37)
MCV RBC AUTO: 96.4 FL — SIGNIFICANT CHANGE UP (ref 81–99)
MONOCYTES # BLD AUTO: 0.62 K/UL — HIGH (ref 0.1–0.6)
MONOCYTES NFR BLD AUTO: 11.8 % — HIGH (ref 1.7–9.3)
NEUTROPHILS # BLD AUTO: 3.59 K/UL — SIGNIFICANT CHANGE UP (ref 1.4–6.5)
NEUTROPHILS NFR BLD AUTO: 68.3 % — SIGNIFICANT CHANGE UP (ref 42.2–75.2)
NITRITE UR-MCNC: NEGATIVE — SIGNIFICANT CHANGE UP
NRBC # BLD: 0 /100 WBCS — SIGNIFICANT CHANGE UP (ref 0–0)
OSMOLALITY UR: 350 MOS/KG — SIGNIFICANT CHANGE UP (ref 50–1200)
PH UR: 6 — SIGNIFICANT CHANGE UP (ref 5–8)
PHOSPHATE SERPL-MCNC: 4.7 MG/DL — SIGNIFICANT CHANGE UP (ref 2.1–4.9)
PLATELET # BLD AUTO: 268 K/UL — SIGNIFICANT CHANGE UP (ref 130–400)
PMV BLD: 8.5 FL — SIGNIFICANT CHANGE UP (ref 7.4–10.4)
POTASSIUM SERPL-MCNC: 4 MMOL/L — SIGNIFICANT CHANGE UP (ref 3.5–5)
POTASSIUM SERPL-SCNC: 4 MMOL/L — SIGNIFICANT CHANGE UP (ref 3.5–5)
POTASSIUM UR-SCNC: 58 MMOL/L — SIGNIFICANT CHANGE UP
PROT ?TM UR-MCNC: 20 MG/DLG/24H — SIGNIFICANT CHANGE UP
PROT SERPL-MCNC: 6 G/DL — SIGNIFICANT CHANGE UP (ref 6–8)
PROT UR-MCNC: SIGNIFICANT CHANGE UP MG/DL
PROT/CREAT UR-RTO: 0.3 RATIO — HIGH (ref 0–0.2)
RBC # BLD: 3.37 M/UL — LOW (ref 4.2–5.4)
RBC # FLD: 13.3 % — SIGNIFICANT CHANGE UP (ref 11.5–14.5)
RBC CASTS # UR COMP ASSIST: 7 /HPF — HIGH (ref 0–4)
RSV RNA NPH QL NAA+NON-PROBE: SIGNIFICANT CHANGE UP
SARS-COV-2 RNA SPEC QL NAA+PROBE: SIGNIFICANT CHANGE UP
SODIUM SERPL-SCNC: 132 MMOL/L — LOW (ref 135–146)
SODIUM UR-SCNC: <20 MMOL/L — SIGNIFICANT CHANGE UP
SP GR SPEC: 1.01 — SIGNIFICANT CHANGE UP (ref 1–1.03)
SQUAMOUS # UR AUTO: 1 /HPF — SIGNIFICANT CHANGE UP (ref 0–5)
TROPONIN T, HIGH SENSITIVITY RESULT: 19 NG/L — HIGH (ref 6–13)
TSH SERPL-MCNC: 1.94 UIU/ML — SIGNIFICANT CHANGE UP (ref 0.27–4.2)
UROBILINOGEN FLD QL: 0.2 MG/DL — SIGNIFICANT CHANGE UP (ref 0.2–1)
UUN UR-MCNC: 512 MG/DL — SIGNIFICANT CHANGE UP
WBC # BLD: 5.25 K/UL — SIGNIFICANT CHANGE UP (ref 4.8–10.8)
WBC # FLD AUTO: 5.25 K/UL — SIGNIFICANT CHANGE UP (ref 4.8–10.8)
WBC UR QL: 367 /HPF — HIGH (ref 0–5)

## 2024-02-02 PROCEDURE — 36415 COLL VENOUS BLD VENIPUNCTURE: CPT

## 2024-02-02 PROCEDURE — 92960 CARDIOVERSION ELECTRIC EXT: CPT

## 2024-02-02 PROCEDURE — 97110 THERAPEUTIC EXERCISES: CPT | Mod: GP

## 2024-02-02 PROCEDURE — 80048 BASIC METABOLIC PNL TOTAL CA: CPT

## 2024-02-02 PROCEDURE — 83036 HEMOGLOBIN GLYCOSYLATED A1C: CPT

## 2024-02-02 PROCEDURE — 84100 ASSAY OF PHOSPHORUS: CPT

## 2024-02-02 PROCEDURE — 93320 DOPPLER ECHO COMPLETE: CPT

## 2024-02-02 PROCEDURE — 83735 ASSAY OF MAGNESIUM: CPT

## 2024-02-02 PROCEDURE — 99291 CRITICAL CARE FIRST HOUR: CPT | Mod: GC

## 2024-02-02 PROCEDURE — 93005 ELECTROCARDIOGRAM TRACING: CPT

## 2024-02-02 PROCEDURE — 82570 ASSAY OF URINE CREATININE: CPT

## 2024-02-02 PROCEDURE — 80061 LIPID PANEL: CPT

## 2024-02-02 PROCEDURE — 85025 COMPLETE CBC W/AUTO DIFF WBC: CPT

## 2024-02-02 PROCEDURE — 84540 ASSAY OF URINE/UREA-N: CPT

## 2024-02-02 PROCEDURE — 93312 ECHO TRANSESOPHAGEAL: CPT

## 2024-02-02 PROCEDURE — 97116 GAIT TRAINING THERAPY: CPT | Mod: GP

## 2024-02-02 PROCEDURE — 99223 1ST HOSP IP/OBS HIGH 75: CPT

## 2024-02-02 PROCEDURE — 71045 X-RAY EXAM CHEST 1 VIEW: CPT | Mod: 26

## 2024-02-02 PROCEDURE — 76705 ECHO EXAM OF ABDOMEN: CPT | Mod: 26

## 2024-02-02 PROCEDURE — 83935 ASSAY OF URINE OSMOLALITY: CPT

## 2024-02-02 PROCEDURE — 93010 ELECTROCARDIOGRAM REPORT: CPT

## 2024-02-02 PROCEDURE — 94640 AIRWAY INHALATION TREATMENT: CPT

## 2024-02-02 PROCEDURE — 81001 URINALYSIS AUTO W/SCOPE: CPT

## 2024-02-02 PROCEDURE — 85652 RBC SED RATE AUTOMATED: CPT

## 2024-02-02 PROCEDURE — 99349 HOME/RES VST EST MOD MDM 40: CPT

## 2024-02-02 PROCEDURE — 93325 DOPPLER ECHO COLOR FLOW MAPG: CPT

## 2024-02-02 PROCEDURE — 0225U NFCT DS DNA&RNA 21 SARSCOV2: CPT

## 2024-02-02 PROCEDURE — 84156 ASSAY OF PROTEIN URINE: CPT

## 2024-02-02 PROCEDURE — 84133 ASSAY OF URINE POTASSIUM: CPT

## 2024-02-02 PROCEDURE — 80053 COMPREHEN METABOLIC PANEL: CPT

## 2024-02-02 PROCEDURE — 84300 ASSAY OF URINE SODIUM: CPT

## 2024-02-02 PROCEDURE — 97162 PT EVAL MOD COMPLEX 30 MIN: CPT | Mod: GP

## 2024-02-02 PROCEDURE — 76775 US EXAM ABDO BACK WALL LIM: CPT

## 2024-02-02 PROCEDURE — 87186 SC STD MICRODIL/AGAR DIL: CPT

## 2024-02-02 PROCEDURE — 87086 URINE CULTURE/COLONY COUNT: CPT

## 2024-02-02 RX ORDER — FUROSEMIDE 40 MG
20 TABLET ORAL DAILY
Refills: 0 | Status: DISCONTINUED | OUTPATIENT
Start: 2024-02-02 | End: 2024-02-05

## 2024-02-02 RX ORDER — TOCILIZUMAB 20 MG/ML
0 INJECTION, SOLUTION, CONCENTRATE INTRAVENOUS
Refills: 0 | DISCHARGE

## 2024-02-02 RX ORDER — RIVAROXABAN 15 MG-20MG
20 KIT ORAL
Refills: 0 | Status: DISCONTINUED | OUTPATIENT
Start: 2024-02-02 | End: 2024-02-08

## 2024-02-02 RX ORDER — FUROSEMIDE 40 MG
20 TABLET ORAL DAILY
Refills: 0 | Status: DISCONTINUED | OUTPATIENT
Start: 2024-02-02 | End: 2024-02-02

## 2024-02-02 RX ORDER — LANOLIN ALCOHOL/MO/W.PET/CERES
3 CREAM (GRAM) TOPICAL AT BEDTIME
Refills: 0 | Status: DISCONTINUED | OUTPATIENT
Start: 2024-02-02 | End: 2024-02-08

## 2024-02-02 RX ORDER — CEFTRIAXONE 500 MG/1
1000 INJECTION, POWDER, FOR SOLUTION INTRAMUSCULAR; INTRAVENOUS EVERY 24 HOURS
Refills: 0 | Status: DISCONTINUED | OUTPATIENT
Start: 2024-02-02 | End: 2024-02-04

## 2024-02-02 RX ORDER — HYDROCORTISONE 1 %
1 OINTMENT (GRAM) TOPICAL
Refills: 0 | DISCHARGE

## 2024-02-02 RX ORDER — HYDROCHLOROTHIAZIDE 25 MG
1 TABLET ORAL
Qty: 0 | Refills: 0 | DISCHARGE

## 2024-02-02 RX ORDER — NITROFURANTOIN (MONOHYDRATE/MACROCRYSTALS) 25; 75 MG/1; MG/1
100 CAPSULE ORAL
Qty: 14 | Refills: 0 | Status: ACTIVE | COMMUNITY
Start: 2023-10-01 | End: 1900-01-01

## 2024-02-02 RX ORDER — PANTOPRAZOLE SODIUM 20 MG/1
40 TABLET, DELAYED RELEASE ORAL
Refills: 0 | Status: DISCONTINUED | OUTPATIENT
Start: 2024-02-02 | End: 2024-02-08

## 2024-02-02 RX ORDER — OLMESARTAN MEDOXOMIL 5 MG/1
1 TABLET, FILM COATED ORAL
Refills: 0 | DISCHARGE

## 2024-02-02 RX ORDER — ONDANSETRON 8 MG/1
4 TABLET, FILM COATED ORAL EVERY 8 HOURS
Refills: 0 | Status: DISCONTINUED | OUTPATIENT
Start: 2024-02-02 | End: 2024-02-08

## 2024-02-02 RX ORDER — HYDROCORTISONE 20 MG
100 TABLET ORAL ONCE
Refills: 0 | Status: COMPLETED | OUTPATIENT
Start: 2024-02-02 | End: 2024-02-02

## 2024-02-02 RX ORDER — SOTALOL HCL 120 MG
80 TABLET ORAL EVERY 12 HOURS
Refills: 0 | Status: DISCONTINUED | OUTPATIENT
Start: 2024-02-02 | End: 2024-02-08

## 2024-02-02 RX ORDER — SENNA PLUS 8.6 MG/1
2 TABLET ORAL AT BEDTIME
Refills: 0 | Status: DISCONTINUED | OUTPATIENT
Start: 2024-02-02 | End: 2024-02-08

## 2024-02-02 RX ORDER — POLYETHYLENE GLYCOL 3350 17 G/17G
17 POWDER, FOR SOLUTION ORAL
Refills: 0 | Status: DISCONTINUED | OUTPATIENT
Start: 2024-02-02 | End: 2024-02-08

## 2024-02-02 RX ORDER — ACETAMINOPHEN 500 MG
650 TABLET ORAL EVERY 6 HOURS
Refills: 0 | Status: DISCONTINUED | OUTPATIENT
Start: 2024-02-02 | End: 2024-02-08

## 2024-02-02 RX ORDER — DENOSUMAB 60 MG/ML
0 INJECTION SUBCUTANEOUS
Refills: 0 | DISCHARGE

## 2024-02-02 RX ADMIN — Medication 20 MILLIGRAM(S): at 23:43

## 2024-02-02 RX ADMIN — Medication 100 MILLIGRAM(S): at 14:51

## 2024-02-02 RX ADMIN — SENNA PLUS 2 TABLET(S): 8.6 TABLET ORAL at 21:22

## 2024-02-02 RX ADMIN — RIVAROXABAN 20 MILLIGRAM(S): KIT at 19:51

## 2024-02-02 NOTE — H&P ADULT - NSHPPHYSICALEXAM_GEN_ALL_CORE
CONSTITUTIONAL: frail, ill appearing     EYES: PERRLA and symmetric, EOMI, No conjunctival or scleral injection, non-icteric    ENMT: Oral mucosa with moist membranes. No external nasal lesions; normal dentition; no gross hearing impairment noted.    NECK: Supple, symmetric and without tracheal deviation; thyroid gland mildly enlarged but without palpable masses    RESPIRATORY: No respiratory distress, no use of accessory muscles; CTA b/l, no wheezes, rales or rhonchi, no dullness or hyperresonance to percussion, no tactile fremitus, no subcutaneous emphysema    CARDIOVASCULAR: irr irr, +S1S2, systolic appreciated, no rubs, no gallops; no JVD; +2 peripheral edema to shins    Vascular: no carotid bruits; carotid pulse palpable, radial pulse palpable, posterior tibialis pulse palpable    GASTROINTESTINAL: Soft, non tender, non distended, no rebound, no guarding; No palpable masses; no hepatosplenomegaly; no hernia palpated;    MUSCULOSKELETAL: no significant limitation on ROM, moves all extremities in plane of bed    SKIN: No rashes or ulcers noted; no subcutaneous nodules or induration palpable    NEUROLOGIC: moves all extremities against resistance, no droop    PSYCHIATRIC: Appropriate insight/judgment; A+O x 3, mood and affect appropriate, recent/remote memory intact GENERAL: NAD, lying in bed comfortably  CHEST/LUNG: Clear to auscultation bilaterally; No rales, rhonchi, wheezing, or rubs. Unlabored respirations  HEART: irregular rate and rhythm; No murmurs, rubs, or gallops  ABDOMEN: Bowel sounds present; Soft, Nontender, Nondistended.    EXTREMITIES:  2+  Le edema  NERVOUS SYSTEM:  Alert & Oriented X3, speech clear. No deficits

## 2024-02-02 NOTE — H&P ADULT - TIME BILLING
Patient seen at bedside, time spent evaluating and treating the patient's acute illness as well as time spent reviewing labs, radiology, discussing with patient

## 2024-02-02 NOTE — H&P ADULT - HISTORY OF PRESENT ILLNESS
Patient is an 82yo woman pmhx of AFib on Xarelto s/p ablation 1/10/24 (Dr. Arzate), mild pHTN, severe MR 2/2 prolapse, Temporal arteritis, Osteoporosis sent from home by EMS when HHA reported "low" BP at home.     Pt in usual state of health prior to ablation 1/10/24; followed shortly after by prolonged hospital course w/ fecal impaction 2/2 colitis complicated by urinary retention that resolved. Pt has been feeling weak and overwhelmed since d/c on 1/31/24; on Thursday pt saw structural cardiology Dr. Ledbetter who adjusted meds and she was also found to have positive UA; ordered macrobid but pt has not picked up meds yet.     Pt endorses reduced urinary OP, endorses regular BMs, denies acute CP, SOB, NORWOOD, orthopnea; denies fever    Today pt HHA noted low BP, and activated EMS for ED eval.     ED  BP 73/37 HR 48 RR 18 99% 2L T 97.9  WBC 5.25 BUN 25 Cr 1.0    Pt given Solu-cortef 100; pt bp improved to 105/54; still too weak to ambulate; admitted to medicine Patient is an 82yo woman pmhx of AFib on Xarelto s/p ablation 1/10/24 (Dr. Arzate), mild pHTN, severe MR 2/2 prolapse, Temporal arteritis, Osteoporosis sent from home by EMS when HHA reported "low" BP at home.     Pt in usual state of health prior to ablation 1/10/24; followed shortly after by prolonged hospital course w/ fecal impaction 2/2 colitis complicated by urinary retention that resolved. Pt has been feeling weak since d/c on 1/31/24; began having urinary sxs - cloudy urine and dysuria and on scheduled f/u cardio appointment Thursday she was also found to have positive UA, ordered macrobid but pt has not picked up yet; pt saw structural cardiology Dr. Ledbetter who adjusted meds (hctz and olmesartan d/c'd; started on lasix PO, pt took this AM);      Today pt HHA noted low BP, and activated EMS for ED eval.     Pt endorses reduced urinary OP, endorses regular BMs, denies acute CP, SOB, NORWOOD, orthopnea; denies fever    ED  BP 73/37 HR 48 RR 18 99% 2L T 97.9  WBC 5.25 BUN 25 Cr 1.0    Pt given Solu-cortef 100; pt bp improved to 105/54; still too weak to ambulate; admitted to medicine

## 2024-02-02 NOTE — ED ADULT NURSE NOTE - OBJECTIVE STATEMENT
Patient BIBA for hypotension- as per EMS the visiting nurse found her to be hypotensive this morning- patient denies any CP, dizziness or SOB

## 2024-02-02 NOTE — ED ADULT TRIAGE NOTE - HEIGHT IN CM
Hospitalist Consult Note        Patient:  Sridhar Starr  YOB: 1928  Date of Service: 5/20/2021  MRN: [id number]   Acct:  [de-identified]   Primary Care Physician: Gigi Webb MD    Chief Complaint:    Reason for consult  Preoperative assessment     Date of Service: Pt seen/examined in consultation on 5/20/2021     History Of Present Illness:      Jaylon Bassame y.o. male who we are asked to see/evaluate by Amarilis Ash MD for preoperative assessment. Patient with history of ESRD on HD MWF, CAD/CHF, Sick sinus syndrome s/p AICD, HTN, HLD, COPD not on BiPAP/CPAP presented after a fall on 05/18/2021 presented after a mechanical fall. XR revealed Acute Sun type B fibular fracture, medial clear space is not widened, lateral ankle swelling with no ankle effusion. Left knee with no acute findings, positive for tricompartemental osteoarthritis and chondrocalcinosis. CXR showed borderline cardiomegaly without acute findings. XR shoulder pending. Hgb stable at baseline ~11. COVID rapid negative. Evaluated by podiatry, plan for ORIF left fibula 05/20/2021. Also seen by Nephrology for inpatient follow up. On evaluation, patient reports he tripped and fell down. Had no associated prodromal symptoms including chest pain/tightness, dyspnea, diaphoresis, dizziness/lightheadedness. Reports no recent recurrent falls. No syncopal episodes. Reports pain in his right shoulder, left knee, and left ankle. Reports  Baseline dyspnea on exertion at baseline with no acute changes. Lives at home and able to perform his ADLs. Does not use walker or cane at baseline. Per chart review   Cardiac catheterization 5/25/2018  Conclusions      Procedure Summary      Left main artery is patent. Right coronary artery is totally occluded. Left circumflex and its artery has a diffuse disease in the mid circumflex   stent is patent.       Left anterior descending artery has a diffuse disease with a 50% stenosis   in the mid segment at 50% stenosis in the distal segment. Distal LAD at the apex has 80% stenosis. Recommendations per Cardio      Patient was admitted with diastolic congestive heart failure. Patient had elevated troponins but troponins are chronically elevated. Right coronary artery is totally occluded and it seems like chronic   occlusion. Left circumflex and its artery stent is patent. Distal LAD has 80% stenosis but it is a small vessel and it is all the way   at the apex. Recommend optimal medical management. Assessment and Plan:-    Preoperative assessment: Hx of obstructive CAD, last cath in 2018 -lad 80% stenosis, almost completely occluded hx of RCA- just medical management , no stents were placed, no stress or echo since. RCRI Class IV with 15% - 20% risk for  CARDIAC event intraoperatively. Patient has moderate risk for intermediate risk surgery. Currently, has no symptoms of worsening dyspnea at baseline or chest pain as well as no evidence of acute CHF. Last ECHO showed EF 60% with grade 1 diastolic dysfunction, mild aortic stenosis, and RVSP 50 mmHg. Would recommend stress test and ECHO prior to surgery given that patient had total occlusion of RCA and 80% stenosis of distal LAD in 05/25/2018.     hypertension urgency - restart all isabell emeds- added hydralazine 5mg iv prn for sbp> 170- closely monitor    Acute Sun type B fibular fracture: plan for surgery per podiatry possibly tomorrow if stress test negative and ECHO with no acute changes. Continue pain control per primary. ESRD on HD MWF: Follows with Dr. Rakel Velez. Last session 5/19/2021. Trend BMP.  CAD/SSS with Pacemaker: on imdur, lopressor, and lisinopril. COPD: no evidence of exacerbation. Last PFT on epic from 07/24/2012- FEV1 112%, FEV1/FVC 74%, more evidence of emphysema than obstruction. No bronchodilator therapy meds included in home meds. PAD: stent in RUE. Noted.   HTN/HLD: on hydralazine, lisinopril, lopressor, pravachol        Past Medical History:        Diagnosis Date    Anemia in chronic kidney disease(285.21)     Arthritis     CAD (coronary artery disease)     Chronic kidney disease     Chronic kidney disease, stage IV (severe) (HCC)     CKD (chronic kidney disease) stage 3, GFR 30-59 ml/min     COPD (chronic obstructive pulmonary disease) (HCC)     GI bleed     Hemodialysis patient ([address] 07/26/2016    @ [facility]    History of blood transfusion     6/2019    Hyperlipidemia     Hyperphosphatemia 5/22/2018    Hypertension     Sick sinus syndrome (HCC)     Systolic congestive heart failure [facility])        Past Surgical History:        Procedure Laterality Date    CARDIAC SURGERY      COLONOSCOPY  2006,2009    COLONOSCOPY N/A 6/27/2019    COLONOSCOPY DIAGNOSTIC performed by Carlos Mills MD at [address] [place] [address]    DIALYSIS FISTULA CREATION  5/6/2016    right arm fistula placement    ENDOSCOPY, COLON, DIAGNOSTIC      PACEMAKER PLACEMENT  2013    [place] ESOPHAGOGASTRODUODENOSCOPY TRANSORAL DIAGNOSTIC N/A 1/18/2018    EGD performed by Shantal Arevalo MD at [place] [address]  2006,2009    UPPER GASTROINTESTINAL ENDOSCOPY Left 6/23/2019    EGD DIAGNOSTIC ONLY performed by Carlos Mills MD at [facility] Medications:   No current facility-administered medications on file prior to encounter.      Current Outpatient Medications on File Prior to Encounter   Medication Sig Dispense Refill    pantoprazole (PROTONIX) 40 MG tablet TAKE 1 TABLET DAILY BEFORE BREAKFAST 90 tablet 3    docusate sodium (COLACE, DULCOLAX) 100 MG CAPS Take 100 mg by mouth 2 times daily 60 capsule 0    ferrous sulfate 325 (65 Fe) MG tablet Take 1 tablet by mouth daily (with breakfast) 30 tablet 5    vitamin C (ASCORBIC ACID) 500 MG tablet Take 1 tablet by mouth daily 30 tablet 3    polyethylene glycol (MIRALAX) powder Renal Miralax Colonoscopy prep. Use as directed. Mix Miralax 238 g with 24 oz clear liquids. Drink 8 oz glass every 20-30 minutes until gone. 238 g 0    cloNIDine (CATAPRES) 0.1 MG tablet Take 0.1 mg by mouth 2 times daily      hydrALAZINE (APRESOLINE) 50 MG tablet Take 50 mg by mouth 2 times daily      cinacalcet (SENSIPAR) 30 MG tablet Take 1 tablet by mouth Daily with supper 30 tablet 3    lisinopril (PRINIVIL;ZESTRIL) 20 MG tablet Take 1 tablet by mouth daily 30 tablet 3    isosorbide mononitrate (IMDUR) 30 MG extended release tablet Take 30 mg by mouth daily      gabapentin (NEURONTIN) 100 MG capsule Take 100 mg by mouth nightly. B Complex-C-Folic Acid (RENAL) 1 MG CAPS Take 1 capsule by mouth daily      metoprolol (LOPRESSOR) 50 MG tablet Take 1 tablet by mouth 2 times daily 180 tablet 3    pravastatin (PRAVACHOL) 80 MG tablet Take 1 tablet by mouth daily 30 tablet 5    naproxen (NAPROSYN) 500 MG tablet Take 1 tablet by mouth daily for 10 days 10 tablet 0       Allergies:    No known allergies    Social History:    reports that he quit smoking about 39 years ago. He has a 40.00 pack-year smoking history. He has never used smokeless tobacco. He reports that he does not drink alcohol and does not use drugs. Family History:       Problem Relation Age of Onset    Diabetes Mother     Heart Disease Mother     Diabetes Sister     Mental Illness Paternal Aunt        Diet:  DIET RENAL;    Review of systems:   Pertinent positives as noted in the HPI. All other systems reviewed and negative. PHYSICAL EXAM:  /79   Pulse 78   Temp 98.9 °F (37.2 °C)   Resp 16   Ht 5' 6\" (1.676 m)   Wt 180 lb (81.6 kg)   SpO2 97%   BMI 29.05 kg/m²   General appearance: No apparent distress, appears stated age and cooperative. HEENT: Normal cephalic, atraumatic without obvious deformity. Extra ocular muscles intact. Conjunctivae/corneas clear. Neck: Supple, with full range of motion.  No jugular venous distention. Trachea midline. Respiratory:  Normal respiratory effort. Clear to auscultation, bilaterally without Rales/Wheezes/Rhonchi. Cardiovascular: Regular rate and rhythm with normal S1/S2 without murmurs, rubs or gallops. Abdomen: Soft, non-tender, non-distended with normal bowel sounds. Musculoskeletal:  No clubbing, cyanosis or edema bilaterally. Left foot wrapped with posterior splinter in place. Skin: Dry skin. No rashes or lesions. Neurologic:  Neurovascularly intact without any focal sensory/motor deficits. Cranial nerves: II-XII intact, grossly non-focal.  Psychiatric: Alert and oriented, thought content appropriate, normal insight  Capillary Refill: Brisk,< 3 seconds   Peripheral Pulses: +2 palpable, equal bilaterally     Labs:   Recent Labs     05/19/21 2219 05/20/21 0626   WBC 5.9 4.5*   HGB 11.5* 10.9*   HCT 34.3* 31.6*    159     Recent Labs     05/19/21 2219 05/20/21 0626    141   K 5.0 4.6   CL 94* 98   CO2 30 28   BUN 32* 36*   CREATININE 6.5* 7.3*   CALCIUM 9.7 9.3     Recent Labs     05/20/21  0626   AST 15   ALT 10*   BILITOT 0.3   ALKPHOS 46     No results for input(s): INR in the last 72 hours. No results for input(s): Charley Loth in the last 72 hours. Urinalysis:    Lab Results   Component Value Date    NITRU NEGATIVE 06/22/2019    WBCUA 50-75 06/22/2019    BACTERIA NONE 06/22/2019    RBCUA 25-50 06/22/2019    BLOODU LARGE 06/22/2019    SPECGRAV 1.013 05/03/2016    GLUCOSEU NEGATIVE 06/22/2019       Radiology:   XR CHEST PORTABLE   Final Result   Stable borderline cardiomegaly without acute findings. This document has been electronically signed by: Victor Manuel Bradley MD on    05/19/2021 10:35 PM      XR KNEE LEFT (MIN 4 VIEWS)   Final Result   1. No acute findings. 2. Tricompartmental osteoarthritis and chondrocalcinosis.       This document has been electronically signed by: Victor Manuel Bradley MD on    05/19/2021 09:19 PM      XR ANKLE LEFT (MIN 3 VIEWS) Final Result   1. Acute Sun type B fibular fracture or medial clear space is not    widened. 2.  Lateral ankle swelling. No ankle effusion. This document has been electronically signed by: Lucie Bruce MD on    05/19/2021 09:19 PM        XR KNEE LEFT (MIN 4 VIEWS)    Result Date: 5/19/2021  4 view left knee Comparison:  CR,SR  - XR KNEE LEFT (MIN 4 VIEWS)  - 03/04/2020 08:36 PM EST Findings: No acute fracture or dislocation. No suprapatellar joint effusion. Marginal osteophytes in the medial, lateral, and patellofemoral compartments. Mild medial compartmental narrowing with calcification in the menisci. No radiopaque foreign body. Vascular calcifications in the soft tissues. 1. No acute findings. 2. Tricompartmental osteoarthritis and chondrocalcinosis. This document has been electronically signed by: Lucie Bruce MD on 05/19/2021 09:19 PM    XR ANKLE LEFT (MIN 3 VIEWS)    Result Date: 5/19/2021  3 view left ankle Comparison: None Findings: Acute oblique fracture of the distal fibula extending to the tibiofibular syndesmosis. Medial clear space is not widened. There is lateral ankle swelling without ankle effusion. No radiopaque foreign body. 1.  Acute Sun type B fibular fracture or medial clear space is not widened. 2.  Lateral ankle swelling. No ankle effusion. This document has been electronically signed by: Lucie Bruce MD on 05/19/2021 09:19 PM    XR CHEST PORTABLE    Result Date: 5/19/2021  1 view chest x-ray Comparison:  CR,SR  - XR CHEST PORTABLE  - 04/30/2021 04:43 PM EDT Findings: No consolidation or effusion. No pneumothorax. Stable borderline cardiomegaly. No pulmonary vascular congestion. Stable left AICD leads in the right atrium and right ventricle. Calcification in the aortic knob. No acute fracture. Stable borderline cardiomegaly without acute findings.  This document has been electronically signed by: Lucie Bruce MD on 05/19/2021 10:35 PM        EKG: NSR with occasional PVC, no acute ischemic changes.       [address] CONSULT    Electronically signed by Vashti Malik MD on 5/20/2021 at 9:53 AM 152.4

## 2024-02-02 NOTE — ED PROVIDER NOTE - ATTENDING CONTRIBUTION TO CARE
I personally evaluated patient. I agree with the findings and plan with all documentation on chart except as documented  in my note.    83-year-old female past medical history of A-fib on Xarelto, osteoporosis, chronic constipation with recent stercoral colitis, MR/TR, who presents to the emergency department with generalized weakness and was found to be hypotensive.  Patient recently admitted and discharged for cervical colitis 3 days ago and has been on a bowel regimen and had visiting nurse at home.  Every time visiting nurses came to see patient over the past couple days she has been hypotensive.  Patient initially refused coming to the hospital but was seen today and was hypotensive in the 70s.  Patient has generalized weakness.  Patient denies any fever, chest pain, shortness of breath, abdominal pain, nausea, vomiting, or diarrhea.    Patient rushed to critical care emergency department and initial blood pressure was 73/37.  Patient bradycardic to 48 and sinus rhythm.  Patient is afebrile.  Large-bore IV placed and full labs sent.  Patient has regular slow heart rate with clear lungs bilaterally.  Patient's benign abdominal exam.  No skin changes or signs of DVT on exam.  Full labs sent and TSH sent, patient given empiric dose of hydrocortisone for concern for possible adrenal insufficiency, patient started on fluid resuscitation.  Patient's blood pressure improved with fluids and hydrocortisone.  Patient remains weak and has difficulty ambulating.  White blood cell count is normal and patient renal ultrasound performed.  Viral panel is negative.  Patient found to have UTI and was started on antibiotics.    ED work up reviewed and results and plan of care discussed with patient. Patient requires admission for further work up, monitoring, and management. Need for admission discussed with patient.

## 2024-02-02 NOTE — ED ADULT TRIAGE NOTE - CHIEF COMPLAINT QUOTE
Patient BIBA for hypotension- as per EMS the visiting nurse found her to be hypotensive this morning- patient denies any CP, dizziness or SOB.

## 2024-02-02 NOTE — H&P ADULT - NSHPREVIEWOFSYSTEMS_GEN_ALL_CORE
General:	denies night sweats, wt changes, cold/heat intolerance    Skin: denies rashes, pruritis, nail/hair changes  	  Ophthalmologic: denies vision changes, denies eye discharge or irritation  	  ENMT: denies nasal discharge, hearing changes, mouth sores, difficulty swallowing    Respiratory and Thorax: denies cough, denies difficulty breathing, denies shortness of breath  	  Cardiovascular: denies CP, denies [place], denies palpitation    Gastrointestinal: endorses recent BM, denies n/v/d    Genitourinary: endorses low urine OP, denies urgency, burning, nocturia    Musculoskeletal: denies muscle/joint pain    Neurological: denies dizziness, syncope, HA    Psychiatric: denies si/hi and hallucinations    Hematology/Lymphatics: denies easy bleeding/bruising    Endocrine: denies wt changes, hair/nail changes, denies cold/heat sensitivity

## 2024-02-02 NOTE — ED PROVIDER NOTE - PROGRESS NOTE DETAILS
Authored by Dr. Brand: 84 y/o F w pmhx of AFib on Xarelto s/p ablation, Temporal arteritis, Osteoporosis, tricuspid regurg, mitral valve regurg, brought in by ambulance from home presents to the emergency department with generalized weakness and fatigue for the past several days.  Patient states she was recently admitted to the hospital and was found to have stercoral proctitis, patient was given multiple medications and was able to have a bowel movement and was ultimately discharged home.  Patient states she last had a bowel movement yesterday described as normal and nonbloody stool.  Per EMS, patient has been having 3 days of hypotension at home.  Initially patient had an episode of hypotension 3 days ago where visiting nurse called 911 however patient refused to come to the hospital.  Visiting nurse today noted patient again was hypotensive and hence this time patient agreed to come to the hospital.  Patient denies any complaints of pain.  Denies recent fevers chills chest pain cough shortness of breath nausea vomiting diarrhea abdominal pain melena hematochezia.  Patient brought to critical care area with hypotension noted on exam, however patient is AAO x 3 answering questions appropriately with normal mental status.  Patient noted to have A-fib with slow ventricular response with a heart rate in 48 to 50s on EKG. Lung exam normal with no tachypnea or retractions, no wheezes or crackles.  Abdomen soft nontender.  Patient refused rectal exam.  Bilateral lower extremity edema with minimal nonpitting edema, no skin changes no erythema.  IV access immediately obtained, will hold off on fluids due to concerns for fluid overload given the patient is on Lasix at home, labs chest x-ray and urine ordered. Pt confirms she takes cardizem once daily, sotalol BID. Possible concern for medication side effects given that pt is on both beta blocker and calcium channel blocker. PAYTON: Patient initially hypotensive to 80s and bradycardic to 50s, irregular.  Blood pressure and heart rate normalized without any fluids or intervention.  However patient still feels generally weak and is unsafe to go home alone.  [facility]

## 2024-02-02 NOTE — ED PROVIDER NOTE - OBJECTIVE STATEMENT
83-year-old female with PMH of A-fib on Xarelto, s/p multiple unsuccessful ablations, mitral valve and tricuspid valve regurgitation, osteoporosis, recent admission for stercoral colitis discharged home 3 days ago on bowel regimen presents ED for evaluation of hypotension and generalized weakness today.  Patient was seen by visiting nurse 3 days ago when she got home noted to be hypertensive however she refused hospitalization at that time.  Visiting nurse came again today noticed patient again to be hypotensive to the 70s this time she felt generally weak so agreed to hospitalization.  Here she reports feeling tired but is no other complaints at this time.  Denies fever, cough, CP, SOB, palpitations, abdominal pain, N/V/D, black or bloody stools, extremity numbness/weakness/paresthesias.  Patient lives at home alone, walks without assistance and says she is normally independent of most ADLs.

## 2024-02-02 NOTE — H&P ADULT - NSHPLABSRESULTS_GEN_ALL_CORE
LABS:                        10.9   5.25  )-----------( 268      ( 02 Feb 2024 14:14 )             32.5     02-02    132<L>  |  92<L>  |  25<H>  ----------------------------<  110<H>  4.0   |  29  |  1.0    Ca    9.2      02 Feb 2024 14:14  Phos  4.7     02-02  Mg     1.9     02-02    TPro  6.0  /  Alb  3.8  /  TBili  0.4  /  DBili  x   /  AST  17  /  ALT  26  /  AlkPhos  73  02-02      Urinalysis Basic - ( 02 Feb 2024 14:14 )    Color: x / Appearance: x / SG: x / pH: x  Gluc: 110 mg/dL / Ketone: x  / Bili: x / Urobili: x   Blood: x / Protein: x / Nitrite: x   Leuk Esterase: x / RBC: x / WBC x   Sq Epi: x / Non Sq Epi: x / Bacteria: x    RADIOLOGY:    < from: POCUS ED Abdomen Limited (02.02.24 @ 15:51) >      IMPRESSION:   No Free Intraperitoneal Fluid  Bilateral pleural effusions  No pericardial effusion    --- End of Report ---      < end of copied text >    < from: Xray Chest 1 View- PORTABLE-Urgent (02.02.24 @ 14:22) >      Impression:    Stable bibasilar opacities/effusions and right upper lobe opacity.        --- End of Report ---      < end of copied text >        VITALS:   T(F): 97.9  HR: 65  BP: 105/54  RR: 18  SpO2: 96%

## 2024-02-02 NOTE — ED PROVIDER NOTE - PHYSICAL EXAMINATION
VITAL SIGNS: I have reviewed nursing notes and confirm.  CONSTITUTIONAL: Well-developed; well-nourished; in no acute distress.  SKIN: Skin exam is warm and dry, no acute rash.  HEAD: Normocephalic; atraumatic.  EYES: PERRL, conjunctiva and sclera clear.  ENT: mmm  CARD: S1, S2 normal; no murmurs, gallops, or rubs. Regular rate and rhythm.  RESP: Normal respiratory effort, no tachypnea or distress. Lungs CTAB, no wheezes, rales or rhonchi.  ABD: soft, NT/ND.  EXT: Normal ROM. 1+ symmetric pitting edema  NEURO: Alert, oriented. Grossly unremarkable. No focal deficits.  PSYCH: Cooperative, appropriate.

## 2024-02-02 NOTE — ED PROVIDER NOTE - DIFFERENTIAL DIAGNOSIS
The differential diagnosis for patients clinical presentation includes but is not limited to:  hypothyroidism, adrenal insufficiency, sepsis Differential Diagnosis

## 2024-02-02 NOTE — H&P ADULT - ASSESSMENT
84yo woman pmhx of AFib on Xarelto s/p ablation 1/10/24 (Dr. Arzate), mild pHTN, severe MR 2/2 prolapse, Temporal arteritis, Osteoporosis being treated for UTI, deconditioning    # pyelonephritis vs cystitis  - f/u UA/UCx/BCx  - ceftriaxone     # ?pre renal camille  - renal bladder US  - f/u U Lytes  - trend BMP  - encourage PO hydration     # mild pHTN  # severe MR  # HFpEF  - small BL pleural effusions, however CXR improved from prior hospitalization  - comfortable laying flat, on room air  - will hold lasix for now iso hypotension, resume as pressure improves on ABx  - f/u Dr. Ledbetter OP    # Hx constipation  - miralax bid senna 2qhs    # Hx retention  - daily bladder scan  - straight cath prn    # ?permanent AF  - will reduce Cardizem iso hypotension  - c/w home sotalol  - c/w home xarelto   - f/u Dr. Le OP    # temporal arteritis - stable, being treated  # osteoporosis - stable, being treated  - c/w OP medications  - f/u OP    DVT on xarelto  Diet Dash/tlc  Activity w/ assistance   82yo woman pmhx of AFib on Xarelto s/p ablation 1/10/24 (Dr. Arzate), mild pHTN, severe MR 2/2 prolapse, Temporal arteritis, Osteoporosis being treated for UTI, deconditioning    # pyelonephritis vs cystitis  - f/u UA/UCx/BCx  - ceftriaxone x 5 days     # ?pre renal camille  - renal bladder US  - f/u U Lytes  - trend BMP  - encourage gentle PO hydration     # mild pHTN  # severe MR  # HFpEF  - small BL pleural effusions, however CXR improved from prior hospitalization  - comfortable laying flat, on room air  - will hold lasix for now iso hypotension, resume as pressure improves on ABx  - f/u cardio/ep cs for help managing fluid status/rate control in hypotensive pt    # Hx constipation  - miralax bid senna 2qhs    # Hx retention  - daily bladder scan  - straight cath prn    # ?permanent AF  - will reduce Cardizem iso hypotension  - c/w home sotalol  - c/w home xarelto   - f/u Dr. Le OP    # temporal arteritis - stable, being treated  # osteoporosis - stable, being treated  - c/w OP medications  - f/u OP    DVT on xarelto  Diet Dash/tlc  Activity w/ assistance   84yo woman pmhx of AFib on Xarelto s/p ablation 1/10/24 (Dr. Arzate), mild pHTN, severe MR 2/2 prolapse, Temporal arteritis, Osteoporosis being treated for UTI, deconditioning    # pyelonephritis vs cystitis  - f/u UA/UCx/BCx  - ceftriaxone x 5 days     # permanent AF  - will reduce Cardizem iso hypotension  - c/w home sotalol  - c/w home xarelto   - f/u EP recs    # ?pre renal camille  - renal bladder US  - f/u U Lytes  - trend BMP  - encourage gentle PO hydration     # mild pHTN  # severe MR  # HFpEF  - small BL pleural effusions, however CXR improved from prior hospitalization  - comfortable laying flat, on room air  - will hold lasix for now iso hypotension, resume as pressure improves on ABx  - f/u cardio/ep cs for help managing fluid status/rate control in hypotensive pt    # Hx constipation  - miralax bid senna 2qhs    # Hx retention  - daily bladder scan  - straight cath prn    # temporal arteritis - stable, being treated  # osteoporosis - stable, being treated  - c/w OP medications  - f/u OP    DVT on xarelto  Diet Dash/tlc  Activity w/ assistance   82yo woman pmhx of AFib on Xarelto s/p ablation 1/10/24 (Dr. Arzate), mild pHTN, severe MR 2/2 prolapse, Temporal arteritis, Osteoporosis being treated for UTI, deconditioning    # pyelonephritis vs cystitis  - f/u UA/UCx/BCx  - ceftriaxone x 5 days     # permanent AF  - will hold Cardizem iso hypotension  - c/w home sotalol  - c/w home xarelto   - f/u EP recs    # ?pre renal camille  - renal bladder US  - f/u U Lytes  - trend BMP  - encourage gentle PO hydration     # mild pHTN  # severe MR  # HFpEF  - small BL pleural effusions, however CXR improved from prior hospitalization  - comfortable laying flat, on room air  - will hold lasix for now iso hypotension, resume as pressure improves on ABx  - f/u cardio/ep cs for help managing fluid status/rate control in hypotensive pt    # Hx constipation  - miralax bid senna 2qhs    # Hx retention  - daily bladder scan  - straight cath prn    # temporal arteritis - stable, being treated  # osteoporosis - stable, being treated  - c/w OP medications  - f/u OP    DVT on xarelto  Diet Dash/tlc  Activity w/ assistance

## 2024-02-02 NOTE — H&P ADULT - ATTENDING COMMENTS
84yo woman pmhx of AFib on Xarelto s/p ablation 1/10/24 (Dr. Arzate), mild pHTN, severe MR 2/2 prolapse, Temporal arteritis, Osteoporosis being treated for UTI, deconditioning    #Acute cystitis   - f/u UA/UCx/BCx  - ceftriaxone x 5 days     #Chronic AF  - hold cardizem but can resume if BP tolerates at a lower rate   - c/w home sotalol  - c/w home xarelto   - f/u EP recs    #DEEJAY - Cardiorenal vs prerenal   - renal bladder US  - f/u U Lytes  - trend BMP  - IV lasix     # mild pHTN  # severe MR  # HFpEF  - patient hypervolemic CXR congested with plueral effusion , Legs markedly edematous   - IV lasix 20 mg   - f/u cardio/ep cs for help managing fluid status/rate control in hypotensive pt    # Hx constipation  - miralax bid senna 2qhs    # Hx retention  - daily bladder scan  - straight cath prn    # temporal arteritis - stable, being treated  # osteoporosis - stable, being treated  - c/w OP medications  - f/u OP    DVT on xarelto  Diet Dash/tlc  Activity w/ assistance

## 2024-02-02 NOTE — ED PROVIDER NOTE - CLINICAL SUMMARY MEDICAL DECISION MAKING FREE TEXT BOX
83-year-old female past medical history of A-fib on Xarelto, osteoporosis, chronic constipation with recent stercoral colitis, MR/TR, who presents to the emergency department with generalized weakness and was found to be hypotensive.  Patient recently admitted and discharged for cervical colitis 3 days ago and has been on a bowel regimen and had visiting nurse at home.  Every time visiting nurses came to see patient over the past couple days she has been hypotensive.  Patient initially refused coming to the hospital but was seen today and was hypotensive in the 70s.  Patient has generalized weakness.  Patient denies any fever, chest pain, shortness of breath, abdominal pain, nausea, vomiting, or diarrhea.    Patient rushed to critical care emergency department and initial blood pressure was 73/37.  Patient bradycardic to 48 and sinus rhythm.  Patient is afebrile.  Large-bore IV placed and full labs sent.  Patient has regular slow heart rate with clear lungs bilaterally.  Patient's benign abdominal exam.  No skin changes or signs of DVT on exam.  Full labs sent and TSH sent, patient given empiric dose of hydrocortisone for concern for possible adrenal insufficiency, patient started on fluid resuscitation.  Patient's blood pressure improved with fluids and hydrocortisone.  Patient remains weak and has difficulty ambulating.  White blood cell count is normal and patient renal ultrasound performed.  Viral panel is negative.  Patient found to have UTI and was started on antibiotics.    ED work up reviewed and results and plan of care discussed with patient. Patient requires admission for further work up, monitoring, and management. Need for admission discussed with patient.

## 2024-02-03 DIAGNOSIS — Z88.8 ALLERGY STATUS TO OTHER DRUGS, MEDICAMENTS AND BIOLOGICAL SUBSTANCES: ICD-10-CM

## 2024-02-03 DIAGNOSIS — T39.4X5A ADVERSE EFFECT OF ANTIRHEUMATICS, NOT ELSEWHERE CLASSIFIED, INITIAL ENCOUNTER: ICD-10-CM

## 2024-02-03 DIAGNOSIS — Z88.2 ALLERGY STATUS TO SULFONAMIDES: ICD-10-CM

## 2024-02-03 DIAGNOSIS — E83.51 HYPOCALCEMIA: ICD-10-CM

## 2024-02-03 DIAGNOSIS — E87.1 HYPO-OSMOLALITY AND HYPONATREMIA: ICD-10-CM

## 2024-02-03 DIAGNOSIS — Z88.1 ALLERGY STATUS TO OTHER ANTIBIOTIC AGENTS STATUS: ICD-10-CM

## 2024-02-03 DIAGNOSIS — Z88.0 ALLERGY STATUS TO PENICILLIN: ICD-10-CM

## 2024-02-03 DIAGNOSIS — I48.20 CHRONIC ATRIAL FIBRILLATION, UNSPECIFIED: ICD-10-CM

## 2024-02-03 DIAGNOSIS — M81.0 AGE-RELATED OSTEOPOROSIS WITHOUT CURRENT PATHOLOGICAL FRACTURE: ICD-10-CM

## 2024-02-03 DIAGNOSIS — I27.20 PULMONARY HYPERTENSION, UNSPECIFIED: ICD-10-CM

## 2024-02-03 DIAGNOSIS — E87.6 HYPOKALEMIA: ICD-10-CM

## 2024-02-03 DIAGNOSIS — I08.1 RHEUMATIC DISORDERS OF BOTH MITRAL AND TRICUSPID VALVES: ICD-10-CM

## 2024-02-03 DIAGNOSIS — M31.6 OTHER GIANT CELL ARTERITIS: ICD-10-CM

## 2024-02-03 DIAGNOSIS — K62.6 ULCER OF ANUS AND RECTUM: ICD-10-CM

## 2024-02-03 DIAGNOSIS — R33.9 RETENTION OF URINE, UNSPECIFIED: ICD-10-CM

## 2024-02-03 DIAGNOSIS — K56.41 FECAL IMPACTION: ICD-10-CM

## 2024-02-03 DIAGNOSIS — T50.995A ADVERSE EFFECT OF OTHER DRUGS, MEDICAMENTS AND BIOLOGICAL SUBSTANCES, INITIAL ENCOUNTER: ICD-10-CM
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
## 2024-02-03 LAB
A1C WITH ESTIMATED AVERAGE GLUCOSE RESULT: 5.7 % — HIGH (ref 4–5.6)
ANION GAP SERPL CALC-SCNC: 11 MMOL/L — SIGNIFICANT CHANGE UP (ref 7–14)
BASOPHILS # BLD AUTO: 0.02 K/UL — SIGNIFICANT CHANGE UP (ref 0–0.2)
BASOPHILS NFR BLD AUTO: 0.4 % — SIGNIFICANT CHANGE UP (ref 0–1)
BUN SERPL-MCNC: 17 MG/DL — SIGNIFICANT CHANGE UP (ref 10–20)
CALCIUM SERPL-MCNC: 9.1 MG/DL — SIGNIFICANT CHANGE UP (ref 8.4–10.5)
CHLORIDE SERPL-SCNC: 93 MMOL/L — LOW (ref 98–110)
CHOLEST SERPL-MCNC: 169 MG/DL — SIGNIFICANT CHANGE UP
CO2 SERPL-SCNC: 34 MMOL/L — HIGH (ref 17–32)
CREAT SERPL-MCNC: 0.7 MG/DL — SIGNIFICANT CHANGE UP (ref 0.7–1.5)
EGFR: 87 ML/MIN/1.73M2 — SIGNIFICANT CHANGE UP
EOSINOPHIL # BLD AUTO: 0.02 K/UL — SIGNIFICANT CHANGE UP (ref 0–0.7)
EOSINOPHIL NFR BLD AUTO: 0.4 % — SIGNIFICANT CHANGE UP (ref 0–8)
ESTIMATED AVERAGE GLUCOSE: 117 MG/DL — HIGH (ref 68–114)
GLUCOSE SERPL-MCNC: 97 MG/DL — SIGNIFICANT CHANGE UP (ref 70–99)
HCT VFR BLD CALC: 33.7 % — LOW (ref 37–47)
HDLC SERPL-MCNC: 70 MG/DL — SIGNIFICANT CHANGE UP
HGB BLD-MCNC: 11.4 G/DL — LOW (ref 12–16)
IMM GRANULOCYTES NFR BLD AUTO: 0.4 % — HIGH (ref 0.1–0.3)
LIPID PNL WITH DIRECT LDL SERPL: 85 MG/DL — SIGNIFICANT CHANGE UP
LYMPHOCYTES # BLD AUTO: 1.05 K/UL — LOW (ref 1.2–3.4)
LYMPHOCYTES # BLD AUTO: 22.6 % — SIGNIFICANT CHANGE UP (ref 20.5–51.1)
MAGNESIUM SERPL-MCNC: 1.8 MG/DL — SIGNIFICANT CHANGE UP (ref 1.8–2.4)
MCHC RBC-ENTMCNC: 32.5 PG — HIGH (ref 27–31)
MCHC RBC-ENTMCNC: 33.8 G/DL — SIGNIFICANT CHANGE UP (ref 32–37)
MCV RBC AUTO: 96 FL — SIGNIFICANT CHANGE UP (ref 81–99)
MONOCYTES # BLD AUTO: 0.5 K/UL — SIGNIFICANT CHANGE UP (ref 0.1–0.6)
MONOCYTES NFR BLD AUTO: 10.8 % — HIGH (ref 1.7–9.3)
NEUTROPHILS # BLD AUTO: 3.04 K/UL — SIGNIFICANT CHANGE UP (ref 1.4–6.5)
NEUTROPHILS NFR BLD AUTO: 65.4 % — SIGNIFICANT CHANGE UP (ref 42.2–75.2)
NON HDL CHOLESTEROL: 99 MG/DL — SIGNIFICANT CHANGE UP
NRBC # BLD: 0 /100 WBCS — SIGNIFICANT CHANGE UP (ref 0–0)
PHOSPHATE SERPL-MCNC: 2.9 MG/DL — SIGNIFICANT CHANGE UP (ref 2.1–4.9)
PLATELET # BLD AUTO: 266 K/UL — SIGNIFICANT CHANGE UP (ref 130–400)
PMV BLD: 8.4 FL — SIGNIFICANT CHANGE UP (ref 7.4–10.4)
POTASSIUM SERPL-MCNC: 3.8 MMOL/L — SIGNIFICANT CHANGE UP (ref 3.5–5)
POTASSIUM SERPL-SCNC: 3.8 MMOL/L — SIGNIFICANT CHANGE UP (ref 3.5–5)
RBC # BLD: 3.51 M/UL — LOW (ref 4.2–5.4)
RBC # FLD: 13.2 % — SIGNIFICANT CHANGE UP (ref 11.5–14.5)
SODIUM SERPL-SCNC: 138 MMOL/L — SIGNIFICANT CHANGE UP (ref 135–146)
TRIGL SERPL-MCNC: 69 MG/DL — SIGNIFICANT CHANGE UP
WBC # BLD: 4.65 K/UL — LOW (ref 4.8–10.8)
WBC # FLD AUTO: 4.65 K/UL — LOW (ref 4.8–10.8)

## 2024-02-03 PROCEDURE — 99232 SBSQ HOSP IP/OBS MODERATE 35: CPT

## 2024-02-03 PROCEDURE — 76775 US EXAM ABDO BACK WALL LIM: CPT | Mod: 26

## 2024-02-03 PROCEDURE — 93010 ELECTROCARDIOGRAM REPORT: CPT | Mod: 76

## 2024-02-03 RX ADMIN — RIVAROXABAN 20 MILLIGRAM(S): KIT at 17:52

## 2024-02-03 RX ADMIN — Medication 20 MILLIGRAM(S): at 06:08

## 2024-02-03 RX ADMIN — PANTOPRAZOLE SODIUM 40 MILLIGRAM(S): 20 TABLET, DELAYED RELEASE ORAL at 06:07

## 2024-02-03 RX ADMIN — Medication 80 MILLIGRAM(S): at 19:39

## 2024-02-03 RX ADMIN — Medication 80 MILLIGRAM(S): at 06:07

## 2024-02-03 NOTE — CONSULT NOTE ADULT - ASSESSMENT
History   AFIB   post  ablation      now in AFIB      severe MR presents   with hypotension    now improved   off BP meds         EPS   follow  up

## 2024-02-04 PROCEDURE — 99233 SBSQ HOSP IP/OBS HIGH 50: CPT

## 2024-02-04 RX ORDER — DILTIAZEM HCL 120 MG
30 CAPSULE, EXT RELEASE 24 HR ORAL EVERY 6 HOURS
Refills: 0 | Status: DISCONTINUED | OUTPATIENT
Start: 2024-02-04 | End: 2024-02-08

## 2024-02-04 RX ORDER — NITROFURANTOIN MACROCRYSTAL 50 MG
100 CAPSULE ORAL
Refills: 0 | Status: DISCONTINUED | OUTPATIENT
Start: 2024-02-04 | End: 2024-02-08

## 2024-02-04 RX ADMIN — Medication 30 MILLIGRAM(S): at 23:21

## 2024-02-04 RX ADMIN — Medication 80 MILLIGRAM(S): at 18:18

## 2024-02-04 RX ADMIN — Medication 100 MILLIGRAM(S): at 01:24

## 2024-02-04 RX ADMIN — RIVAROXABAN 20 MILLIGRAM(S): KIT at 18:17

## 2024-02-04 RX ADMIN — PANTOPRAZOLE SODIUM 40 MILLIGRAM(S): 20 TABLET, DELAYED RELEASE ORAL at 05:41

## 2024-02-04 RX ADMIN — Medication 30 MILLIGRAM(S): at 18:17

## 2024-02-04 RX ADMIN — Medication 20 MILLIGRAM(S): at 05:41

## 2024-02-04 RX ADMIN — Medication 30 MILLIGRAM(S): at 12:15

## 2024-02-04 RX ADMIN — POLYETHYLENE GLYCOL 3350 17 GRAM(S): 17 POWDER, FOR SOLUTION ORAL at 05:42

## 2024-02-04 RX ADMIN — Medication 80 MILLIGRAM(S): at 05:41

## 2024-02-04 RX ADMIN — Medication 100 MILLIGRAM(S): at 18:27

## 2024-02-04 NOTE — PHYSICAL THERAPY INITIAL EVALUATION ADULT - ADDITIONAL COMMENTS
Pt lives alone in an apartment with no steps to enter and no stairs to negotiate. Prior to admission, pt was transferring and ambulating independently with occasional use of cane. Pt has a RW but declines to use it because she says it is too heavy to get into and out of the car and too difficult to open and close.

## 2024-02-04 NOTE — PHYSICAL THERAPY INITIAL EVALUATION ADULT - NSACTIVITYREC_GEN_A_PT
Pt performed BL LE therex including glute extensions, standing marches, and mini squats while standing in RW x10 reps each, ankle pumps and knee extensions while seated x10 reps each.   *Next session, assess pt's gait with SC as pt is adamant about not using her RW at home.

## 2024-02-04 NOTE — PHYSICAL THERAPY INITIAL EVALUATION ADULT - GENERAL OBSERVATIONS, REHAB EVAL
08:25-09:00. Pt encountered semireclined in bed in NAD, +tele. Pt denies any pain/discomfort at rest and is agreeable to PT at this time.

## 2024-02-04 NOTE — PHYSICAL THERAPY INITIAL EVALUATION ADULT - PERTINENT HX OF CURRENT PROBLEM, REHAB EVAL
Patient is an 84yo woman pmhx of AFib on Xarelto s/p ablation 1/10/24 (Dr. Arzate), mild pHTN, severe MR 2/2 prolapse, Temporal arteritis, Osteoporosis sent from home by EMS when HHA reported "low" BP at home. Pt in usual state of health prior to ablation 1/10/24; followed shortly after by prolonged hospital course w/ fecal impaction 2/2 colitis complicated by urinary retention that resolved. Pt has been feeling weak since d/c on 1/31/24; began having urinary sxs - cloudy urine and dysuria and on scheduled f/u cardio appointment Thursday she was also found to have positive UA, ordered macrobid but pt has not picked up yet; pt saw structural cardiology Dr. Ledbetter who adjusted meds (hctz and olmesartan d/c'd; started on lasix PO, pt took this AM); Today pt HHA noted low BP, and activated EMS for ED eval. Pt endorses reduced urinary OP, endorses regular BMs, denies acute CP, SOB, [place], orthopnea; denies fever

## 2024-02-04 NOTE — PHYSICAL THERAPY INITIAL EVALUATION ADULT - GAIT TRAINING, PT EVAL
By discharge, pt will be able to ambulate 150 feet with least restrictive assistive device with supervision to return to PLOF.

## 2024-02-05 ENCOUNTER — TRANSCRIPTION ENCOUNTER (OUTPATIENT)
Age: 84
End: 2024-02-05

## 2024-02-05 PROBLEM — I95.9 HYPOTENSION: Status: ACTIVE | Noted: 2024-02-01

## 2024-02-05 PROBLEM — K59.09 CHRONIC CONSTIPATION: Status: ACTIVE | Noted: 2024-02-04

## 2024-02-05 LAB
-  AMOXICILLIN/CLAVULANIC ACID: SIGNIFICANT CHANGE UP
-  AMPICILLIN/SULBACTAM: SIGNIFICANT CHANGE UP
-  AMPICILLIN: SIGNIFICANT CHANGE UP
-  AZTREONAM: SIGNIFICANT CHANGE UP
-  CEFAZOLIN: SIGNIFICANT CHANGE UP
-  CEFEPIME: SIGNIFICANT CHANGE UP
-  CEFOXITIN: SIGNIFICANT CHANGE UP
-  CEFTRIAXONE: SIGNIFICANT CHANGE UP
-  CEFUROXIME: SIGNIFICANT CHANGE UP
-  CIPROFLOXACIN: SIGNIFICANT CHANGE UP
-  ERTAPENEM: SIGNIFICANT CHANGE UP
-  GENTAMICIN: SIGNIFICANT CHANGE UP
-  IMIPENEM: SIGNIFICANT CHANGE UP
-  LEVOFLOXACIN: SIGNIFICANT CHANGE UP
-  MEROPENEM: SIGNIFICANT CHANGE UP
-  NITROFURANTOIN: SIGNIFICANT CHANGE UP
-  PIPERACILLIN/TAZOBACTAM: SIGNIFICANT CHANGE UP
-  TOBRAMYCIN: SIGNIFICANT CHANGE UP
-  TRIMETHOPRIM/SULFAMETHOXAZOLE: SIGNIFICANT CHANGE UP
ALBUMIN SERPL ELPH-MCNC: 4.2 G/DL
ALP BLD-CCNC: 77 U/L
ALT SERPL-CCNC: 33 U/L
ANION GAP SERPL CALC-SCNC: 18 MMOL/L
APPEARANCE: ABNORMAL
AST SERPL-CCNC: 23 U/L
BILIRUB SERPL-MCNC: 0.6 MG/DL
BILIRUBIN URINE: NEGATIVE
BLOOD URINE: ABNORMAL
BUN SERPL-MCNC: 28 MG/DL
CALCIUM SERPL-MCNC: 9.7 MG/DL
CHLORIDE SERPL-SCNC: 90 MMOL/L
CO2 SERPL-SCNC: 25 MMOL/L
COLOR: YELLOW
CREAT SERPL-MCNC: 0.8 MG/DL
CULTURE RESULTS: ABNORMAL
EGFR: 74 ML/MIN/1.73M2
GLUCOSE QUALITATIVE U: NEGATIVE MG/DL
GLUCOSE SERPL-MCNC: 97 MG/DL
HCT VFR BLD CALC: 36.5 %
HGB BLD-MCNC: 11.7 G/DL
KETONES URINE: ABNORMAL MG/DL
LEUKOCYTE ESTERASE URINE: ABNORMAL
MCHC RBC-ENTMCNC: 31.7 PG
MCHC RBC-ENTMCNC: 32.1 G/DL
MCV RBC AUTO: 98.9 FL
METHOD TYPE: SIGNIFICANT CHANGE UP
NITRITE URINE: POSITIVE
ORGANISM # SPEC MICROSCOPIC CNT: ABNORMAL
ORGANISM # SPEC MICROSCOPIC CNT: SIGNIFICANT CHANGE UP
PH URINE: 6
PLATELET # BLD AUTO: 322 K/UL
PMV BLD AUTO: 0 /100 WBCS
PMV BLD: 8.8 FL
POTASSIUM SERPL-SCNC: 4.1 MMOL/L
PROT SERPL-MCNC: 6.4 G/DL
PROTEIN URINE: 100 MG/DL
RBC # BLD: 3.69 M/UL
RBC # FLD: 13.4 %
SODIUM SERPL-SCNC: 133 MMOL/L
SPECIFIC GRAVITY URINE: 1.02
SPECIMEN SOURCE: SIGNIFICANT CHANGE UP
UROBILINOGEN URINE: 0.2 MG/DL
WBC # FLD AUTO: 5.69 K/UL

## 2024-02-05 PROCEDURE — 99232 SBSQ HOSP IP/OBS MODERATE 35: CPT

## 2024-02-05 PROCEDURE — 93010 ELECTROCARDIOGRAM REPORT: CPT

## 2024-02-05 PROCEDURE — 99223 1ST HOSP IP/OBS HIGH 75: CPT

## 2024-02-05 RX ORDER — SALICYLIC ACID 0.5 %
1 CLEANSER (GRAM) TOPICAL
Refills: 0 | Status: DISCONTINUED | OUTPATIENT
Start: 2024-02-05 | End: 2024-02-08

## 2024-02-05 RX ORDER — FLUTICASONE PROPIONATE 50 MCG
1 SPRAY, SUSPENSION NASAL
Refills: 0 | Status: DISCONTINUED | OUTPATIENT
Start: 2024-02-05 | End: 2024-02-08

## 2024-02-05 RX ORDER — FUROSEMIDE 40 MG
20 TABLET ORAL DAILY
Refills: 0 | Status: DISCONTINUED | OUTPATIENT
Start: 2024-02-06 | End: 2024-02-08

## 2024-02-05 RX ORDER — SODIUM CHLORIDE 0.65 %
1 AEROSOL, SPRAY (ML) NASAL
Refills: 0 | Status: DISCONTINUED | OUTPATIENT
Start: 2024-02-05 | End: 2024-02-08

## 2024-02-05 RX ADMIN — Medication 30 MILLIGRAM(S): at 13:14

## 2024-02-05 RX ADMIN — Medication 1 SPRAY(S): at 23:33

## 2024-02-05 RX ADMIN — Medication 20 MILLIGRAM(S): at 05:42

## 2024-02-05 RX ADMIN — Medication 80 MILLIGRAM(S): at 05:41

## 2024-02-05 RX ADMIN — RIVAROXABAN 20 MILLIGRAM(S): KIT at 18:52

## 2024-02-05 RX ADMIN — Medication 30 MILLIGRAM(S): at 05:41

## 2024-02-05 RX ADMIN — Medication 80 MILLIGRAM(S): at 18:52

## 2024-02-05 RX ADMIN — PANTOPRAZOLE SODIUM 40 MILLIGRAM(S): 20 TABLET, DELAYED RELEASE ORAL at 05:42

## 2024-02-05 RX ADMIN — Medication 30 MILLIGRAM(S): at 18:56

## 2024-02-05 RX ADMIN — Medication 100 MILLIGRAM(S): at 18:52

## 2024-02-05 RX ADMIN — Medication 100 MILLIGRAM(S): at 05:42

## 2024-02-05 RX ADMIN — Medication 1 APPLICATION(S): at 18:00

## 2024-02-05 RX ADMIN — Medication 30 MILLIGRAM(S): at 23:34

## 2024-02-05 NOTE — DISCHARGE NOTE NURSING/CASE MANAGEMENT/SOCIAL WORK - PATIENT PORTAL LINK FT
You can access the FollowMyHealth Patient Portal offered by [facility] by registering at the following website: [email or web address]. By joining [facility]’s FollowMyHealth portal, you will also be able to view your health information using other applications (apps) compatible with our system.

## 2024-02-05 NOTE — DIETITIAN INITIAL EVALUATION ADULT - PERTINENT MEDS FT
MEDICATIONS  (STANDING):  diltiazem    Tablet 30 milliGRAM(s) Oral every 6 hours  nitrofurantoin monohydrate/macrocrystals (MACROBID) 100 milliGRAM(s) Oral two times a day  pantoprazole    Tablet 40 milliGRAM(s) Oral before breakfast  polyethylene glycol 3350 17 Gram(s) Oral two times a day  rivaroxaban 20 milliGRAM(s) Oral with dinner  senna 2 Tablet(s) Oral at bedtime  sotalol. 80 milliGRAM(s) Oral every 12 hours    MEDICATIONS  (PRN):  aluminum hydroxide/magnesium hydroxide/simethicone Suspension 30 milliLiter(s) Oral every 4 hours PRN Dyspepsia

## 2024-02-05 NOTE — PLAN
[FreeTextEntry1] : 1.To return to ER for BP management 2. continue all medications as prescribed 3. f/u with CARDS/PCP/GI 4. Maintain DASH diet 5. F/u bowel regimen 6. Call PCP/TCM for any questions or concerns.

## 2024-02-05 NOTE — CONSULT NOTE ADULT - ASSESSMENT
EP Dr Lester  Cards Dr Angulo  Structural Dr Ledbetter      83y Female with h/o HTN, severe MR/MVP pulm HTN, AF on Xarelto, s/p cryoblation 1/10/24, with subsequent readmission for fecal impaction, UTI now readmitted due to hypotension.. Was found back in AF rate controlled.   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 Was found back in AF rate controlled.   Meds adjusted, BP improves but still borderline    AF on Xarelto, s/p ablation  severe MR  hypotension  UTI    con't current management  con't tele  once BP improves, will schedule MIGUEL A/DCCV with Dr Ledbetter   Mitral clip with Dr Ledbetter as out patient  Maintain electrolytes K>4.0 Mg >2.0   complete UTI treatment  will follow     EP Dr Lester  Cards Dr Angulo  Structural Dr Ledbetter      83y Female with h/o HTN, severe MR/MVP pulm HTN, AF on Xarelto, s/p cryoblation 1/10/24, with subsequent readmission for fecal impaction, UTI now readmitted due to hypotension.. Was found back in AF rate controlled.   Meds adjusted, BP improves but still borderline    AF on Xarelto, s/p ablation  severe MR  hypotension  UTI    con't current management  con't tele  once BP improves, will schedule MIGUEL A/DCCV with Dr Anatoly Soriano for 2/6/24  NPO after MN  Mitral clip with Dr Ledbetter as out patient  Maintain electrolytes K>4.0 Mg >2.0   complete UTI treatment  will follow

## 2024-02-05 NOTE — DIETITIAN INITIAL EVALUATION ADULT - OTHER CALCULATIONS
Energy: 6708-3952 kcal (20-25 kcal/kg)  Protein: 64-76g/day (1-1.2 g/kg)  Fluids: 1046-8368 ml/day   Estimated needs with consideration for age and recent appetite changes.

## 2024-02-05 NOTE — CONSULT NOTE ADULT - SUBJECTIVE AND OBJECTIVE BOX
----- Message from Edgar Allen MD sent at 1/27/2022  5:54 PM EST -----  01/27/22       Tell him that his lab work showed that his kidney function (serum creatinine of 1.67 with a BUN of 24) is mildly abnormal but is stable from what it is been over the last 8 months.  His CBC shows his hemoglobin is mildly low with a hemoglobin of 11.3.  His hemoglobin has been low over the last 11 months.       Please send a copy of this report to his PCP.  Lorenzo samson  
Called pt and advised of Dr Allen's note. Verb understanding.     [facility]  
Patient is a 83y old  Female who presents with a chief complaint of     HPI:  Patient is an 84yo woman pmhx of AFib on Xarelto s/p ablation 1/10/24 (Dr. Arzate), mild pHTN, severe MR 2/2 prolapse, Temporal arteritis, Osteoporosis sent from home by EMS when HHA reported "low" BP at home.     Pt in usual state of health prior to ablation 1/10/24; followed shortly after by prolonged hospital course w/ fecal impaction 2/2 colitis complicated by urinary retention that resolved. Pt has been feeling weak since d/c on 1/31/24; began having urinary sxs - cloudy urine and dysuria and on scheduled f/u cardio appointment Thursday she was also found to have positive UA, ordered macrobid but pt has not picked up yet; pt saw structural cardiology Dr. Ledbetter who adjusted meds (hctz and olmesartan d/c'd; started on lasix PO, pt took this AM);      Today pt HHA noted low BP, and activated EMS for ED eval.     Pt endorses reduced urinary OP, endorses regular BMs, denies acute CP, SOB, NORWOOD, orthopnea; denies fever    ED  BP 73/37 HR 48 RR 18 99% 2L T 97.9  WBC 5.25 BUN 25 Cr 1.0    Pt given Solu-cortef 100; pt bp improved to 105/54; still too weak to ambulate; admitted to medicine (02 Feb 2024 17:10)      PAST MEDICAL & SURGICAL HISTORY:  Atrial fibrillation      HTN (hypertension)      History of temporal arteritis      History of temporal artery biopsy      Osteoporosis      H/O temporal arteritis      S/P cataract surgery          PREVIOUS DIAGNOSTIC TESTING:      ECHO  FINDINGS:    STRESS  FINDINGS:    CATHETERIZATION  FINDINGS:    MEDICATIONS  (STANDING):  cefTRIAXone   IVPB 1000 milliGRAM(s) IV Intermittent every 24 hours  furosemide   Injectable 20 milliGRAM(s) IV Push daily  pantoprazole    Tablet 40 milliGRAM(s) Oral before breakfast  polyethylene glycol 3350 17 Gram(s) Oral two times a day  rivaroxaban 20 milliGRAM(s) Oral with dinner  senna 2 Tablet(s) Oral at bedtime  sotalol. 80 milliGRAM(s) Oral every 12 hours    MEDICATIONS  (PRN):  acetaminophen     Tablet .. 650 milliGRAM(s) Oral every 6 hours PRN Temp greater or equal to 38C (100.4F), Mild Pain (1 - 3)  aluminum hydroxide/magnesium hydroxide/simethicone Suspension 30 milliLiter(s) Oral every 4 hours PRN Dyspepsia  melatonin 3 milliGRAM(s) Oral at bedtime PRN Insomnia  ondansetron Injectable 4 milliGRAM(s) IV Push every 8 hours PRN Nausea and/or Vomiting      FAMILY HISTORY:      SOCIAL HISTORY:  CIGARETTES:    ALCOHOL:    REVIEW OF SYSTEMS:  CONSTITUTIONAL: No fever, weight loss, or fatigue  NECK: No pain or stiffness  RESPIRATORY: No cough, wheezing, chills or hemoptysis; No shortness of breath  CARDIOVASCULAR: No chest pain, palpitations, dizziness, or leg swelling  GASTROINTESTINAL: No abdominal or epigastric pain. No nausea, vomiting, or hematemesis; No diarrhea or constipation. No melena or hematochezia.  GENITOURINARY: No dysuria, frequency, hematuria, or incontinence  NEUROLOGICAL: No headaches, memory loss, loss of strength, numbness, or tremors  SKIN: No itching, burning, rashes, or lesions   ENDOCRINE: No heat or cold intolerance; No hair loss  MUSCULOSKELETAL: No joint pain or swelling; No muscle, back, or extremity pain  HEME/LYMPH: No easy bruising, or bleeding gums          Vital Signs Last 24 Hrs  T(C): 36.3 (03 Feb 2024 12:49), Max: 36.6 (02 Feb 2024 22:40)  T(F): 97.4 (03 Feb 2024 12:49), Max: 97.8 (02 Feb 2024 22:40)  HR: 98 (03 Feb 2024 12:49) (65 - 98)  BP: 111/57 (03 Feb 2024 12:49) (98/54 - 140/83)  BP(mean): --  RR: 18 (03 Feb 2024 12:49) (18 - 18)  SpO2: 96% (02 Feb 2024 15:40) (96% - 96%)    Parameters below as of 02 Feb 2024 15:40  Patient On (Oxygen Delivery Method): nasal cannula  O2 Flow (L/min): 2          PHYSICAL EXAM:  GENERAL: NAD, well-groomed, well-developed  HEAD:  Atraumatic, Normocephalic  NECK: Supple, No JVD, Normal thyroid  NERVOUS SYSTEM:  Alert & Oriented X3, Good concentration  CHEST/LUNG: Clear to percussion bilaterally; No rales, rhonchi, wheezing, or rubs  HEART: Regular rate and rhythm; No murmurs, rubs, or gallops  ABDOMEN: Soft, Nontender, Nondistended; Bowel sounds present  EXTREMITIES:  2+ Peripheral Pulses, No clubbing, cyanosis, or edema  SKIN: No rashes or lesions    INTERPRETATION OF TELEMETRY:    ECG:    I&O's Detail    03 Feb 2024 07:01  -  03 Feb 2024 14:34  --------------------------------------------------------  IN:    Oral Fluid: 650 mL  Total IN: 650 mL    OUT:    Voided (mL): 266 mL  Total OUT: 266 mL    Total NET: 384 mL          LABS:                        11.4   4.65  )-----------( 266      ( 03 Feb 2024 06:51 )             33.7     02-03    138  |  93<L>  |  17  ----------------------------<  97  3.8   |  34<H>  |  0.7    Ca    9.1      03 Feb 2024 06:51  Phos  2.9     02-03  Mg     1.8     02-03    TPro  6.0  /  Alb  3.8  /  TBili  0.4  /  DBili  x   /  AST  17  /  ALT  26  /  AlkPhos  73  02-02          Urinalysis Basic - ( 03 Feb 2024 06:51 )    Color: x / Appearance: x / SG: x / pH: x  Gluc: 97 mg/dL / Ketone: x  / Bili: x / Urobili: x   Blood: x / Protein: x / Nitrite: x   Leuk Esterase: x / RBC: x / WBC x   Sq Epi: x / Non Sq Epi: x / Bacteria: x      I&O's Summary    03 Feb 2024 07:01  -  03 Feb 2024 14:34  --------------------------------------------------------  IN: 650 mL / OUT: 266 mL / NET: 384 mL        RADIOLOGY & ADDITIONAL STUDIES:
Patient is a 83y old  Female who presents with a chief complaint of Hypotension     (2024 11:35)        HPI:  Patient is an 82yo woman pmhx of AFib on Xarelto s/p ablation 1/10/24 (Dr. Arzate), mild pHTN, severe MR 2/2 prolapse, Temporal arteritis, Osteoporosis sent from home by EMS when HHA reported "low" BP at home.     Pt in usual state of health prior to ablation 1/10/24; followed shortly after by prolonged hospital course w/ fecal impaction 2/2 colitis complicated by urinary retention that resolved. Pt has been feeling weak since d/c on 24; began having urinary sxs - cloudy urine and dysuria and on scheduled f/u cardio appointment Thursday she was also found to have positive UA, ordered macrobid but pt has not picked up yet; pt saw structural cardiology Dr. Ledbetter who adjusted meds (hctz and olmesartan d/c'd; started on lasix PO, pt took this AM);      Today pt HHA noted low BP, and activated EMS for ED eval.     Pt endorses reduced urinary OP, endorses regular BMs, denies acute CP, SOB, NORWOOD, orthopnea; denies fever    ED  BP 73/37 HR 48 RR 18 99% 2L T 97.9  WBC 5.25 BUN 25 Cr 1.0    Pt given Solu-cortef 100; pt bp improved to 105/54; still too weak to ambulate; admitted to medicine (2024 17:10)      Electrophysiology:  83y Female with h/o HTN, severe MR/MVP pulm HTN, AF on Xarelto, s/p cryoblation 1/10/24, with subsequent readmission for fecal impaction, now readmitted due to hypotension.  Patient developed constipation post procedure, was admitted, for fecal impaction, subsequently developed UTI, being treated. Was sent to ED by VNS for sBP 70s. Patient states she was felling tired and winded, but denies dizziness, dyspnea, LOC. In hospital was found back in AF rate controlled.   Meds adjusted, BP improves but still borderline  Ambulates with walker, asymptomatic    REVIEW OF SYSTEMS    [x ] A ten-point review of systems was otherwise negative except as noted.  [ ] Due to altered mental status/intubation, subjective information were not able to be obtained from the patient. History was obtained, to the extent possible, from review of the chart and collateral sources of information.      PAST MEDICAL & SURGICAL HISTORY:  Atrial fibrillation      HTN (hypertension)      History of temporal arteritis      History of temporal artery biopsy      Osteoporosis      H/O temporal arteritis      S/P cataract surgery          Home Medications:  Actemra 162 mg/0.9 mL subcutaneous solution: subcutaneous every 3 weeks (2024 17:36)  Lasix 20 mg oral tablet: 1 tab(s) orally once a day (2024 17:33)  Prolia 60 mg/mL subcutaneous solution: subcutaneous every 6 months (2024 17:36)  sotalol 80 mg oral tablet: 1 tab(s) orally 2 times a day (2024 17:36)  Xarelto 20 mg oral tablet: 1 tab(s) orally once a day (in the evening) (2024 17:36)      Allergies:  sulfonamides (Unknown)  penicillin V potassium (Unknown)  cephalexin (Unknown)  Cipro (Sedation/Somnol)  amoxicillin (Other (Mild to Mod))  naproxen (Unknown)      FAMILY HISTORY:       SOCIAL HISTORY: denies tobacco / ETOH / illicit drug use     CIGARETTES:  ALCOHOL:  MARIJUANA:  ILLICIT DRUGS:        PREVIOUS DIAGNOSTIC TESTING:      ECHO  FINDINGS:  < from: TTE Echo Complete w/o Contrast w/ Doppler (24 @ 09:04) >  Summary:   1. LV Ejection Fraction by Dixon's Method with a biplane EF of 57 %.   2. Mildly enlarged left atrium.   3. Mildly enlarged right atrium.   4. Mild mitral annular calcification.   5. Moderate to severe mitral valve regurgitation.   6. Mild-moderate tricuspid regurgitation.   7. Sclerotic aortic valve with normal opening.    < end of copied text >    STRESS  FINDINGS:    CATHETERIZATION  FINDINGS:    ELECTROPHYSIOLOGY STUDY  FINDINGS:    < from: Electrophysiology Order (01.10.24 @ 09:09) >  Successful Cryoablation of the left superior, right superior, left  inferior and right inferior pulmonary veins with ablation of PV  potentials and entrance / exit block as endpoints.    < end of copied text >  CAROTID ULTRASOUND:  FINDINGS    VENOUS DUPLEX SCAN:  FINDINGS:    CHEST CT PULMONARY ANGIO with IV Contrast:  FINDINGS:      MEDICATIONS  (STANDING):  diltiazem    Tablet 30 milliGRAM(s) Oral every 6 hours  fluticasone propionate 50 MICROgram(s)/spray Nasal Spray 1 Spray(s) Both Nostrils two times a day  nitrofurantoin monohydrate/macrocrystals (MACROBID) 100 milliGRAM(s) Oral two times a day  pantoprazole    Tablet 40 milliGRAM(s) Oral before breakfast  polyethylene glycol 3350 17 Gram(s) Oral two times a day  rivaroxaban 20 milliGRAM(s) Oral with dinner  senna 2 Tablet(s) Oral at bedtime  sotalol. 80 milliGRAM(s) Oral every 12 hours  vitamin A &amp; D Ointment 1 Application(s) Topical two times a day    MEDICATIONS  (PRN):  acetaminophen     Tablet .. 650 milliGRAM(s) Oral every 6 hours PRN Temp greater or equal to 38C (100.4F), Mild Pain (1 - 3)  aluminum hydroxide/magnesium hydroxide/simethicone Suspension 30 milliLiter(s) Oral every 4 hours PRN Dyspepsia  artificial  tears Solution 1 Drop(s) Both EYES three times a day PRN Dry Eyes  melatonin 3 milliGRAM(s) Oral at bedtime PRN Insomnia  ondansetron Injectable 4 milliGRAM(s) IV Push every 8 hours PRN Nausea and/or Vomiting      Vital Signs Last 24 Hrs  T(C): 37 (2024 13:11), Max: 37 (2024 13:11)  T(F): 98.6 (2024 13:11), Max: 98.6 (2024 13:11)  HR: 111 (2024 13:11) (72 - 112)  BP: 94/50 (2024 13:11) (89/53 - 118/68)  BP(mean): 65 (2024 13:11) (65 - 65)  RR: 18 (2024 12:30) (18 - 18)  SpO2: 96% (2024 20:08) (96% - 96%)    Parameters below as of 2024 20:08  Patient On (Oxygen Delivery Method): room air        PHYSICAL EXAM:    GENERAL: In no apparent distress, well nourished, and hydrated.  HEAD:  Atraumatic, Normocephalic  EYES: EOMI, PERRLA, conjunctiva and sclera clear  NECK: Supple and normal thyroid.  No JVD or carotid bruit.  Carotid pulse is 2+ bilaterally.  HEART: IRRegular rate and rhythm; No murmurs, rubs, or gallops.  PULMONARY: Clear to auscultation and perfusion.  No rales, wheezing, or rhonchi bilaterally.  ABDOMEN: Soft, Nontender, Nondistended; Bowel sounds present  EXTREMITIES:  2+ Peripheral Pulses, No clubbing, cyanosis, or edema  NEUROLOGICAL: Grossly nonfocal    I&O's Detail    2024 07:01  -  2024 07:00  --------------------------------------------------------  IN:    Oral Fluid: 765 mL  Total IN: 765 mL    OUT:    Voided (mL): 1750 mL  Total OUT: 1750 mL    Total NET: -985 mL      2024 07:01  -  2024 16:02  --------------------------------------------------------  IN:    Oral Fluid: 600 mL  Total IN: 600 mL    OUT:    Voided (mL): 200 mL  Total OUT: 200 mL    Total NET: 400 mL        Daily     Daily Weight in k (2024 05:33)    INTERPRETATION OF TELEMETRY:    EC Lead ECG:   Ventricular Rate 98 BPM    QRS Duration 86 ms    Q-T Interval 378 ms    QTC Calculation(Bazett) 482 ms    R Axis 43 degrees    T Axis 233 degrees    Diagnosis Line Atrial fibrillation with premature ventricular or aberrantly conducted  complexes  Nonspecific T wave abnormality  Prolonged QT  Abnormal ECG    Confirmed by DEAN OCASIO MD ([id number]) on 2024 6:37:27 AM (24 @ 04:29)  12 Lead ECG:   Ventricular Rate 103 BPM    QRS Duration 92 ms    Q-T Interval 384 ms    QTC Calculation(Bazett) 503 ms    R Axis 9 degrees    T Axis -75 degrees    Diagnosis Line Atrial fibrillation with rapid ventricular response  Minimal voltage criteria for LVH, may be normal variant ( Shahram product )  Nonspecific T wave abnormality  Abnormal ECG    Confirmed by Ronaldo Jacobs ([id number]) on 2024 9:27:29 AM (24 @ 19:34)  12 Lead ECG:   Ventricular Rate 90 BPM    QRS Duration 80 ms    Q-T Interval 400 ms    QTC Calculation(Bazett) 489 ms    R Axis 39 degrees    T Axis -84 degrees    Diagnosis Line Atrial fibrillation  Low voltage QRS  T wave abnormality, consider inferior ischemia  T wave abnormality, consider anterolateral ischemia  Prolonged QT  Abnormal ECG    Confirmed by Ronaldo Jacobs ([id number]) on 2024 9:28:22 AM (24 @ 05:21)        LABS:                  BNP      Culture - Urine (collected 24 @ 20:18)  Source: Clean Catch Clean Catch (Midstream)  Final Report (24 @ 11:58):    >100,000 CFU/ml Escherichia coli    <10,000 CFU/ml Normal Urogenital billy present  Organism: Escherichia coli (24 @ 11:58)  Organism: Escherichia coli (24 @ 11:58)      Method Type: DAISY      -  Amoxicillin/Clavulanic Acid: S <=8/4      -  Ampicillin: S <=8 These ampicillin results predict results for amoxicillin      -  Ampicillin/Sulbactam: S <=4/2      -  Aztreonam: S <=4      -  Cefazolin: S <=2 For uncomplicated UTI with K. pneumoniae, E. coli, or P. mirablis: DAISY <=16 is sensitive and DAISY >=32 is resistant. This also predicts results for oral agents cefaclor, cefdinir, cefpodoxime, cefprozil, cefuroxime axetil, cephalexin and locarbef for uncomplicated UTI. Note that some isolates may be susceptible to these agents while testing resistant to cefazolin.      -  Cefepime: S <=2      -  Cefoxitin: S <=8      -  Ceftriaxone: S <=1      -  Cefuroxime: S <=4      -  Ciprofloxacin: S <=0.25      -  Ertapenem: S <=0.5      -  Gentamicin: S <=2      -  Imipenem: S <=1      -  Levofloxacin: S <=0.5      -  Meropenem: S <=1      -  Nitrofurantoin: S <=32 Should not be used to treat pyelonephritis      -  Piperacillin/Tazobactam: S <=8      -  Tobramycin: S <=2      -  Trimethoprim/Sulfamethoxazole: S <=0.5/9.5            RADIOLOGY & ADDITIONAL STUDIES:

## 2024-02-05 NOTE — DIETITIAN INITIAL EVALUATION ADULT - PERTINENT LABORATORY DATA
2/3:   H/H: 11.4/33.7 (L), Cl: 93 (L),   A1C with Estimated Average Glucose Result: 5.7 % (02-03-24 @ 06:51)

## 2024-02-05 NOTE — DIETITIAN INITIAL EVALUATION ADULT - COLLABORATION WITH OTHER PROVIDERS
Interventions: meals and snacks, medical food supplements  Monitoring/evaluation: energy/protein intake, weights, skin status, labs, NFPE

## 2024-02-05 NOTE — DIETITIAN INITIAL EVALUATION ADULT - NAME AND PHONE
Thais Pope RDN  X [id number] or via teams  Pt is at a high nutritional risk. RD to f/u in 3-5 days or prn. Thais Pope RDN  X [id number] or via teams  Pt is at a moderate nutritional risk. RD to f/u in 5-7 days or prn.

## 2024-02-05 NOTE — DIETITIAN INITIAL EVALUATION ADULT - ORAL INTAKE PTA/DIET HISTORY
Pt alert at time of visit. States she typically has a great appetite and loves food but since cardiac ablation surgery last month her appetite has been very poor. She " eats a few bites and then can't eat any more. Pt endorses her wt has been stable and reports her UBW at 140#.  Pt takes calcium, vit D and a mvi at home. Pt does not consume any other oral supplements at home.  NKFA

## 2024-02-05 NOTE — PHYSICAL EXAM
[No Acute Distress] : no acute distress [Normal Sclera/Conjunctiva] : normal sclera/conjunctiva [Normal Outer Ear/Nose] : the outer ears and nose were normal in appearance [No Respiratory Distress] : no respiratory distress  [No Accessory Muscle Use] : no accessory muscle use [Normal] : soft, non-tender, non-distended, no masses palpated, no HSM and normal bowel sounds

## 2024-02-05 NOTE — DIETITIAN INITIAL EVALUATION ADULT - NS FNS REASON FOR WEIGHT CHANG
Per [facility] pt wt 1/20/24 82.1 kg. Per pt wt has been stable over last year with no sig changes. Pt states UBW at 64 kg. Accuracy of previously listed wt in question./other (specify)

## 2024-02-05 NOTE — REVIEW OF SYSTEMS
[Lower Ext Edema] : lower extremity edema [Constipation] : constipation [Unsteady Walking] : ataxia [Negative] : Heme/Lymph [Fever] : no fever [Chills] : no chills [Vision Problems] : no vision problems [Chest Pain] : no chest pain [Shortness Of Breath] : no shortness of breath [Incontinence] : no incontinence [Muscle Weakness] : no muscle weakness [Skin Rash] : no skin rash

## 2024-02-05 NOTE — HISTORY OF PRESENT ILLNESS
[Home] : at home, [unfilled] , at the time of the visit. [Other Location: e.g. Home (Enter Location, City,State)___] : at [unfilled] [Other:____] : [unfilled] [Verbal consent obtained from patient] : the patient, [unfilled] [FreeTextEntry1] : F/u post hospitalization at [facility] from 1/17 - 30 for Acute on chronic Afib/fecal impaction [de-identified] : Brief Hospital Course:  82yo F w. pmhx of AFib on Xarelto s/p ablation one week ago, pHTN, Temporal arteritis, Osteoporosis presenting with complaints of constipation since last week Tuesday 01/09. Reports had ablation w/ CT attending Dr. Lester on week ago, since than has been extremely constipated with very little appetite. Reports taking a suppository on Sunday with very small amount of watery brown stool.   This patient is Enrolled in the Post-Discharge [facility] Follow Up Program through [facility] for Continuity of Care S/P Recent Hospitalization until seen by PCP.  Televisit made with pt an RINKU Fleming. Reports pt's BP 78/50. Pt taking multiple BP meds, she saw her cardiologist yesterday Olmesartan was dc, labs were done. T/c her CARDS to confirm her medications. Pt was also contacted earlier that she has a UTI and Macrobid was prescribed. To be delivered later today. Pt will go back to the ER for further evaluation. She was sent back a few days go for low BP and pulse. Reports no bm x 2dys as she had stopped the bowel regimen. Pt to f/u with Dr. Hunt for outpt colonoscopy. Pt is alert and oriented x 3. loves alone but has a very supportive neighbor who is present with her.   TCM NP reviewed that pt is under the [facility] program for home care until pt is seen by PCP.  TCM NP will be assigned to sign Home Care orders post-discharge.

## 2024-02-05 NOTE — DIETITIAN INITIAL EVALUATION ADULT - ORAL NUTRITION SUPPLEMENTS
Recommend Ensure three times daily to enhance kcal and protein intake. Recommend Ensure three times daily to enhance kcal and protein intake. Provides 1050 kcal and 48 g protein total

## 2024-02-05 NOTE — DIETITIAN INITIAL EVALUATION ADULT - OTHER INFO
84 y/o female admitted for hypotension and weakness.   pt has hx of AFib on Xarelto s/p ablation 1/10/24, mild pulmonary HTN, severe MR 2/2 prolapse, Temporal arteritis, Osteoporosis  patient was recently admitted for fecal impaction. Pt currently on abx for UTI.

## 2024-02-05 NOTE — ASSESSMENT
[FreeTextEntry1] : 84yo F w. pmhx of AFib on Xarelto s/p ablation one week ago, pHTN, Temporal arteritis, Osteoporosis presenting with complaints of constipation since last week Tuesday 01/09. Reports had ablation w/ CT attending Dr. Lester on week ago, since than has been extremely constipated with very little appetite. Reports taking a suppository on Sunday with very small amount of watery brown stool.  #Afib -cont Diltiazem -cont Sotalol -f/u with PCP/CARDS #constipation -cont Dulcolax -cont Miralax -cont Senna -states no bm in 2dys, stopped bowel regimen -maintain adequate nutrition and hydration -f/u with PCP #UTI -cont Macrobid as prescribed -maintain adequate hydration -practice good hygiene -f/u with PCP for ongoing management #Hypotension -BP 78/50  -Asymptomatic -back to ER for further treatment

## 2024-02-06 LAB
ERYTHROCYTE [SEDIMENTATION RATE] IN BLOOD: 41 MM/HR — HIGH (ref 0–20)
RAPID RVP RESULT: SIGNIFICANT CHANGE UP
SARS-COV-2 RNA SPEC QL NAA+PROBE: SIGNIFICANT CHANGE UP

## 2024-02-06 PROCEDURE — 99232 SBSQ HOSP IP/OBS MODERATE 35: CPT

## 2024-02-06 RX ADMIN — PANTOPRAZOLE SODIUM 40 MILLIGRAM(S): 20 TABLET, DELAYED RELEASE ORAL at 05:49

## 2024-02-06 RX ADMIN — Medication 100 MILLIGRAM(S): at 17:32

## 2024-02-06 RX ADMIN — SENNA PLUS 2 TABLET(S): 8.6 TABLET ORAL at 22:50

## 2024-02-06 RX ADMIN — Medication 30 MILLIGRAM(S): at 23:04

## 2024-02-06 RX ADMIN — Medication 80 MILLIGRAM(S): at 05:50

## 2024-02-06 RX ADMIN — Medication 1 APPLICATION(S): at 17:32

## 2024-02-06 RX ADMIN — Medication 1 APPLICATION(S): at 06:10

## 2024-02-06 RX ADMIN — Medication 80 MILLIGRAM(S): at 17:33

## 2024-02-06 RX ADMIN — POLYETHYLENE GLYCOL 3350 17 GRAM(S): 17 POWDER, FOR SOLUTION ORAL at 17:36

## 2024-02-06 RX ADMIN — Medication 30 MILLIGRAM(S): at 17:33

## 2024-02-06 RX ADMIN — Medication 1 SPRAY(S): at 17:34

## 2024-02-06 RX ADMIN — Medication 30 MILLIGRAM(S): at 11:37

## 2024-02-06 RX ADMIN — Medication 100 MILLIGRAM(S): at 05:49

## 2024-02-06 RX ADMIN — RIVAROXABAN 20 MILLIGRAM(S): KIT at 17:33

## 2024-02-06 RX ADMIN — Medication 20 MILLIGRAM(S): at 05:48

## 2024-02-06 RX ADMIN — Medication 30 MILLIGRAM(S): at 05:49

## 2024-02-07 ENCOUNTER — RESULT REVIEW (OUTPATIENT)
Age: 84
End: 2024-02-07

## 2024-02-07 PROCEDURE — 93320 DOPPLER ECHO COMPLETE: CPT | Mod: 26

## 2024-02-07 PROCEDURE — 93312 ECHO TRANSESOPHAGEAL: CPT | Mod: 26,XU

## 2024-02-07 PROCEDURE — 93010 ELECTROCARDIOGRAM REPORT: CPT

## 2024-02-07 PROCEDURE — 92960 CARDIOVERSION ELECTRIC EXT: CPT | Mod: XU

## 2024-02-07 PROCEDURE — 93010 ELECTROCARDIOGRAM REPORT: CPT | Mod: 77

## 2024-02-07 PROCEDURE — 99232 SBSQ HOSP IP/OBS MODERATE 35: CPT

## 2024-02-07 PROCEDURE — 93325 DOPPLER ECHO COLOR FLOW MAPG: CPT | Mod: 26

## 2024-02-07 RX ORDER — AMIODARONE HYDROCHLORIDE 400 MG/1
1 TABLET ORAL
Qty: 450 | Refills: 0 | Status: DISCONTINUED | OUTPATIENT
Start: 2024-02-07 | End: 2024-02-07

## 2024-02-07 RX ORDER — AMIODARONE HYDROCHLORIDE 400 MG/1
0.5 TABLET ORAL
Qty: 450 | Refills: 0 | Status: DISCONTINUED | OUTPATIENT
Start: 2024-02-07 | End: 2024-02-07

## 2024-02-07 RX ORDER — AMIODARONE HYDROCHLORIDE 400 MG/1
150 TABLET ORAL ONCE
Refills: 0 | Status: DISCONTINUED | OUTPATIENT
Start: 2024-02-07 | End: 2024-02-07

## 2024-02-07 RX ADMIN — Medication 100 MILLIGRAM(S): at 17:31

## 2024-02-07 RX ADMIN — Medication 30 MILLIGRAM(S): at 11:33

## 2024-02-07 RX ADMIN — Medication 1 SPRAY(S): at 06:19

## 2024-02-07 RX ADMIN — Medication 80 MILLIGRAM(S): at 06:02

## 2024-02-07 RX ADMIN — Medication 80 MILLIGRAM(S): at 17:30

## 2024-02-07 RX ADMIN — Medication 1 SPRAY(S): at 06:02

## 2024-02-07 RX ADMIN — Medication 30 MILLIGRAM(S): at 17:30

## 2024-02-07 RX ADMIN — Medication 20 MILLIGRAM(S): at 06:02

## 2024-02-07 RX ADMIN — POLYETHYLENE GLYCOL 3350 17 GRAM(S): 17 POWDER, FOR SOLUTION ORAL at 17:31

## 2024-02-07 RX ADMIN — PANTOPRAZOLE SODIUM 40 MILLIGRAM(S): 20 TABLET, DELAYED RELEASE ORAL at 06:20

## 2024-02-07 RX ADMIN — SENNA PLUS 2 TABLET(S): 8.6 TABLET ORAL at 22:51

## 2024-02-07 RX ADMIN — RIVAROXABAN 20 MILLIGRAM(S): KIT at 17:31

## 2024-02-07 RX ADMIN — Medication 1 APPLICATION(S): at 06:02

## 2024-02-07 RX ADMIN — Medication 30 MILLIGRAM(S): at 23:02

## 2024-02-07 RX ADMIN — Medication 100 MILLIGRAM(S): at 06:02

## 2024-02-07 NOTE — PROGRESS NOTE ADULT - ASSESSMENT
83 year old female with history of AFib on Xarelto s/p ablation 1/10/24, mild pulmonary HTN, severe MR 2/2 prolapse, Temporal arteritis, Osteoporosis came to ED for hypotension and dizziness, she was found with low BP by visiting nurse, patient was recently admitted for fecal impaction, her medication were adjusted for high rate AFIB, also she had recent symptoms for UTI 2 days ago and was prescribed antibiotics but didn't take. In the ED BP 73/37, UA is positive.     #Hypotension:   Improved, likely from BP medication, worsened with UTI.   BP was 73/53 in the ED, also was low at home. BP improved  Holding Losartan, Cardizem dose reduced from 360 to 120mg daily.     #UTI:   Patient with dysuria, UA was positive. Urine cx grew E.Coli, she has allergy to multiple antibiotics.   continue Macrobid     #Chronic Atrial Fibrillation  s/p Recent ablation 1/10/24.   Resume Cardizem with lower dose 30mg q 6hrs.   Continue Sotalol and Xarelto.   EP and cardiology follow up, plan for MIGUEL A/Cardioversion today.     #Mild acute HFpEF:   #Severe Mitral Regurgitation   Patient with mild leg edema  CXR showed mild pulmonary congestion  s/p Lasix IV 20mg, continue Lasix 20mg po daily.   Recently evaluated by Dr. Ledbetter outpatient, follow up for Mitral Clip.     #Constipation  Continue miralax bid senna 2qhs    #Temporal arteritis - stable, ESR 41  #Osteoporosis - stable,     DVT on xarelto  #Progress Note Handoff:  Pending (specify): monitor post cardioversion, f/u cardio tomorrow, possible dc in 24-48 hours   Disposition: Home with home care in 24 hrs      Total time spent to complete patient's bedside assessment, review medical chart, discuss medical plan of care with covering medical team was more than 35 minutes  with >50% of time spent face to face with patient, discussion with patient/family and/or coordination of care. My note supersedes them medical resident note in case of discrepancy.      Marisol Nash,  
83 year old female with history of AFib on Xarelto s/p ablation 1/10/24, mild pulmonary HTN, severe MR 2/2 prolapse, Temporal arteritis, Osteoporosis came to ED for hypotension and dizziness, she was found with low BP by visiting nurse, patient was recently admitted for fecal impaction, her medication were adjusted for high rate AFIB, also she had recent symptoms for UTI 2 days ago and was prescribed antibiotics but didn't take. In the ED BP 73/37, UA is positive.     A/P:   Hypotension:   Improved, likely from BP medication, worsened with UTI.   BP was 73/53 in the ED, also was low at home. BP improved  Holding Losartan and Cardizem, recently switched from HCTZ to Lasix 20mg po daily.     UTI:   Patient with dysuria, UA was positive.   Started on Rocephin but refused to take it for allergic history to penicllin.     Chronic Atrial Fibrillation:   s/p Recent ablation 1/10/24.   Resume Cardizem with lower dose 30mg q 6hrs.   Continue Sotalol and Xarelto.     Mild acute HFpEF:   Severe Mitral Regurgitation   Patient with mild leg edema  CXR showed mild pulmonary congestion  s/p Lasix IV 20mg, can switch to Lasix 20mg po daily.   Recently evaluated by Dr. Ledbetter outpatient, follow up.     Constipation  Continue miralax bid senna 2qhs    # temporal arteritis - stable,   # osteoporosis - stable,       DVT on xarelto  #Progress Note Handoff:  Pending (specify):  monitor BP.   Family discussion:  Disposition: Home. 
83 year old female with history of AFib on Xarelto s/p ablation 1/10/24, mild pulmonary HTN, severe MR 2/2 prolapse, Temporal arteritis, Osteoporosis came to ED for hypotension and dizziness, she was found with low BP by visiting nurse, patient was recently admitted for fecal impaction, her medication were adjusted for high rate AFIB, also she had recent symptoms for UTI 2 days ago and was prescribed antibiotics but didn't take. In the ED BP 73/37, UA is positive.     A/P:   Hypotension:   Improved, likely from BP medication, worsened with UTI.   BP was 73/53 in the ED, also was low at home. BP improved  Holding Losartan and Cardizem, recently switched from HCTZ to Lasix 20mg po daily.     UTI:   Patient with dysuria, UA was positive.   Started on Rocephin but refused to take it for allergic history to penicllin. Start on Macrobid     Chronic Atrial Fibrillation:   s/p Recent ablation 1/10/24.   Resume Cardizem with lower dose 30mg q 6hrs.   Continue Sotalol and Xarelto.     Mild acute HFpEF:   Severe Mitral Regurgitation   Patient with mild leg edema  CXR showed mild pulmonary congestion  s/p Lasix IV 20mg, continue Lasix 20mg po daily.   Recently evaluated by Dr. Ledbetter outpatient, follow up.     Constipation  Continue miralax bid senna 2qhs    Temporal arteritis - stable,   Osteoporosis - stable,     DVT on xarelto  #Progress Note Handoff:  Pending (specify):  monitor BP.   Family discussion:  Disposition: Home in 24 hrs.    
83 year old female with history of AFib on Xarelto s/p ablation 1/10/24, mild pulmonary HTN, severe MR 2/2 prolapse, Temporal arteritis, Osteoporosis came to ED for hypotension and dizziness, she was found with low BP by visiting nurse, patient was recently admitted for fecal impaction, her medication were adjusted for high rate AFIB, also she had recent symptoms for UTI 2 days ago and was prescribed antibiotics but didn't take. In the ED BP 73/37, UA is positive.     A/P:   Hypotension:   Improved, likely from BP medication, worsened with UTI.   BP was 73/53 in the ED, also was low at home. BP improved  Holding Losartan, Cardizem dose reduced from 360 to 120mg daily.     UTI:   Patient with dysuria, UA was positive.   continue Macrobid     Chronic Atrial Fibrillation:   s/p Recent ablation 1/10/24.   Resume Cardizem with lower dose 30mg q 6hrs.   Continue Sotalol and Xarelto.   EP and cardiology follow up for possible MIGUEL A/Cardioversion.     Mild acute HFpEF:   Severe Mitral Regurgitation   Patient with mild leg edema  CXR showed mild pulmonary congestion  s/p Lasix IV 20mg, continue Lasix 20mg po daily.   Recently evaluated by Dr. Ledbetter outpatient, follow up for Mitral Clip.     Constipation  Continue miralax bid senna 2qhs    Temporal arteritis - stable,   Osteoporosis - stable,     DVT on xarelto  #Progress Note Handoff:  Pending (specify): EP and cardiology follow up for possible MIGUEL A and cardioversion.   Family discussion:  Disposition: Home with home care.   
83 year old female with history of AFib on Xarelto s/p ablation 1/10/24, mild pulmonary HTN, severe MR 2/2 prolapse, Temporal arteritis, Osteoporosis came to ED for hypotension and dizziness, she was found with low BP by visiting nurse, patient was recently admitted for fecal impaction, her medication were adjusted for high rate AFIB, also she had recent symptoms for UTI 2 days ago and was prescribed antibiotics but didn't take. In the ED BP 73/37, UA is positive.     A/P:   Hypotension:   Improved, likely from BP medication, worsened with UTI.   BP was 73/53 in the ED, also was low at home. BP improved  Holding Losartan, Cardizem dose reduced from 360 to 120mg daily.     UTI:   Patient with dysuria, UA was positive. Urine cx grew E.Coli, she has allergy to multiple antibiotics.   continue Macrobid     Chronic Atrial Fibrillation:   s/p Recent ablation 1/10/24.   Resume Cardizem with lower dose 30mg q 6hrs.   Continue Sotalol and Xarelto.   EP and cardiology follow up, plan for MIGUEL A/Cardioversion today.     Mild acute HFpEF:   Severe Mitral Regurgitation   Patient with mild leg edema  CXR showed mild pulmonary congestion  s/p Lasix IV 20mg, continue Lasix 20mg po daily.   Recently evaluated by Dr. Ledbetter outpatient, follow up for Mitral Clip.     Constipation  Continue miralax bid senna 2qhs    Temporal arteritis - stable, ESR 41  Osteoporosis - stable,     DVT on xarelto  #Progress Note Handoff:  Pending (specify): MIGUEL A/DCCV today,    Family discussion:  Disposition: Home with home care in 24 hrs

## 2024-02-07 NOTE — CHART NOTE - NSCHARTNOTEFT_GEN_A_CORE
Pt urine culture showed E.coli, afebrile, no leucocytosis, still refusing ceftriaxone, says she gets very confused and drugged, called pharmacy for other options. since pt is allergy to sulfonamide as well, ordering nitrofurantion BID.
PRE-OP DIAGNOSIS:  - Severe MR   - A fib     POST-OP DIAGNOSIS:  - No OFELIA thrombus   - Severe MR   - Normal LVEF     PROCEDURE: Transesophageal echocardiogram    Primary Physician: Dr. Malpeso, Dr. Goldberg   Assistant: Dr. Aguillon    ANESTHESIA TYPE:   - As documented by anesthesia     CONDITION:  [  ] Good    ESTIMATED BLOOD LOSS: None    COMPLICATIONS: None    FINDINGS:    After risks and benefits of procedures were explained, informed consent was obtained and placed in chart. Refer to Anesthesia note for sedation details.  The MIGUEL A probe was passed into the esophagus without difficulty.  Transesophageal and transgastric images were obtained.  The MIGUEL A probe was removed without difficulty and examined.  There was no evidence for bleeding.  The patient tolerated the procedure well without any immediate MIGUEL A-related complications.      Preliminary Findings:  LA: Severely enlarged.   OFELIA: Left atrial appendage was clear of clot and smoke.  LV: LVEF was estimated at 50%.   MV: Severe MR  AV: Mild AI. Normal opening.   RA: Mod Enlarged   RV: Normal   Wires / Catheters: None  TV: Mild TR.   PV: Mild PI.   IAS: no PFO. Moderate sized ASD with left to right shunt.   Pericardial Effusion: None   Aorta: There was mild, simple/complex atheroma seen in the thoracic aorta.     NSR restored with one 200J or direct current delivery     Full report to follow
Pre-procedure Assessment:  Chart reviewed.  I agree with the assessment and plan and noted any changes below.  02-07-24 @ 15:01

## 2024-02-07 NOTE — PROGRESS NOTE ADULT - SUBJECTIVE AND OBJECTIVE BOX
GRACIELA GUEVARA  83y  Female      Patient is a 83y old  Female who presents with a chief complaint of Hypotension     (05 Feb 2024 11:35)      INTERVAL HPI/OVERNIGHT EVENTS:  No new complaints.   Vital Signs Last 24 Hrs  T(C): 36.7 (06 Feb 2024 04:45), Max: 36.7 (05 Feb 2024 19:42)  T(F): 98.1 (06 Feb 2024 04:45), Max: 98.1 (06 Feb 2024 04:45)  HR: 95 (06 Feb 2024 11:38) (91 - 118)  BP: 117/72 (06 Feb 2024 11:38) (102/62 - 117/72)  BP(mean): 77 (06 Feb 2024 04:45) (77 - 77)  RR: 18 (06 Feb 2024 04:45) (18 - 18)  SpO2: 95% (05 Feb 2024 20:20) (95% - 95%)    Parameters below as of 05 Feb 2024 20:20  Patient On (Oxygen Delivery Method): room air          02-05-24 @ 07:01  -  02-06-24 @ 07:00  --------------------------------------------------------  IN: 1060 mL / OUT: 1970 mL / NET: -910 mL    02-06-24 @ 07:01  -  02-06-24 @ 14:17  --------------------------------------------------------  IN: 0 mL / OUT: 200 mL / NET: -200 mL            Consultant(s) Notes Reviewed:  [x ] YES  [ ] NO          MEDICATIONS  (STANDING):  diltiazem    Tablet 30 milliGRAM(s) Oral every 6 hours  fluticasone propionate 50 MICROgram(s)/spray Nasal Spray 1 Spray(s) Both Nostrils two times a day  furosemide    Tablet 20 milliGRAM(s) Oral daily  nitrofurantoin monohydrate/macrocrystals (MACROBID) 100 milliGRAM(s) Oral two times a day  pantoprazole    Tablet 40 milliGRAM(s) Oral before breakfast  polyethylene glycol 3350 17 Gram(s) Oral two times a day  rivaroxaban 20 milliGRAM(s) Oral with dinner  senna 2 Tablet(s) Oral at bedtime  sotalol. 80 milliGRAM(s) Oral every 12 hours  vitamin A &amp; D Ointment 1 Application(s) Topical two times a day    MEDICATIONS  (PRN):  acetaminophen     Tablet .. 650 milliGRAM(s) Oral every 6 hours PRN Temp greater or equal to 38C (100.4F), Mild Pain (1 - 3)  aluminum hydroxide/magnesium hydroxide/simethicone Suspension 30 milliLiter(s) Oral every 4 hours PRN Dyspepsia  artificial  tears Solution 1 Drop(s) Both EYES three times a day PRN Dry Eyes  melatonin 3 milliGRAM(s) Oral at bedtime PRN Insomnia  ondansetron Injectable 4 milliGRAM(s) IV Push every 8 hours PRN Nausea and/or Vomiting  sodium chloride 0.65% Nasal 1 Spray(s) Both Nostrils two times a day PRN Nasal Congestion      LABS                  Lactate Trend        CAPILLARY BLOOD GLUCOSE          Culture - Urine (collected 02-02-24 @ 20:18)  Source: Clean Catch Clean Catch (Midstream)  Final Report (02-05-24 @ 11:58):    >100,000 CFU/ml Escherichia coli    <10,000 CFU/ml Normal Urogenital billy present  Organism: Escherichia coli (02-05-24 @ 11:58)  Organism: Escherichia coli (02-05-24 @ 11:58)      Method Type: DAISY      -  Amoxicillin/Clavulanic Acid: S <=8/4      -  Ampicillin: S <=8 These ampicillin results predict results for amoxicillin      -  Ampicillin/Sulbactam: S <=4/2      -  Aztreonam: S <=4      -  Cefazolin: S <=2 For uncomplicated UTI with K. pneumoniae, E. coli, or P. mirablis: DAISY <=16 is sensitive and DAISY >=32 is resistant. This also predicts results for oral agents cefaclor, cefdinir, cefpodoxime, cefprozil, cefuroxime axetil, cephalexin and locarbef for uncomplicated UTI. Note that some isolates may be susceptible to these agents while testing resistant to cefazolin.      -  Cefepime: S <=2      -  Cefoxitin: S <=8      -  Ceftriaxone: S <=1      -  Cefuroxime: S <=4      -  Ciprofloxacin: S <=0.25      -  Ertapenem: S <=0.5      -  Gentamicin: S <=2      -  Imipenem: S <=1      -  Levofloxacin: S <=0.5      -  Meropenem: S <=1      -  Nitrofurantoin: S <=32 Should not be used to treat pyelonephritis      -  Piperacillin/Tazobactam: S <=8      -  Tobramycin: S <=2      -  Trimethoprim/Sulfamethoxazole: S <=0.5/9.5        RADIOLOGY & ADDITIONAL TESTS:    Imaging Personally Reviewed:  [ ] YES  [ ] NO    HEALTH ISSUES - PROBLEM Dx:          PHYSICAL EXAM:  GENERAL: NAD, well-developed.  HEAD:  Atraumatic, Normocephalic.  EYES: EOMI, PERRLA, conjunctiva and sclera clear.  NECK: Supple, No JVD.  CHEST/LUNG: improved bibasilar rales.   HEART: Irregular rate and rhythm; S1 S2.   ABDOMEN: Soft, Nontender, Nondistended; Bowel sounds present.  EXTREMITIES:  2+ Peripheral Pulses, trace leg edema.   PSYCH: AAOx3.  NEUROLOGY: non-focal.  SKIN: No rashes or lesions.
  GRACIELA GUEVARA  83y  Female      Patient is a 83y old  Female who presents with a chief complaint of     INTERVAL HPI/OVERNIGHT EVENTS:  She feels ok, leg edema improved, no dizziness,   Vital Signs Last 24 Hrs  T(C): 36.1 (03 Feb 2024 05:34), Max: 36.6 (02 Feb 2024 13:32)  T(F): 97 (03 Feb 2024 05:34), Max: 97.9 (02 Feb 2024 13:32)  HR: 88 (03 Feb 2024 05:34) (48 - 91)  BP: 124/71 (03 Feb 2024 06:00) (73/37 - 140/83)  BP(mean): --  RR: 18 (03 Feb 2024 05:34) (18 - 18)  SpO2: 96% (02 Feb 2024 15:40) (96% - 99%)    Parameters below as of 02 Feb 2024 15:40  Patient On (Oxygen Delivery Method): nasal cannula  O2 Flow (L/min): 2        02-03-24 @ 07:01  -  02-03-24 @ 12:35  --------------------------------------------------------  IN: 0 mL / OUT: 266 mL / NET: -266 mL            Consultant(s) Notes Reviewed:  [x ] YES  [ ] NO          MEDICATIONS  (STANDING):  cefTRIAXone   IVPB 1000 milliGRAM(s) IV Intermittent every 24 hours  furosemide   Injectable 20 milliGRAM(s) IV Push daily  pantoprazole    Tablet 40 milliGRAM(s) Oral before breakfast  polyethylene glycol 3350 17 Gram(s) Oral two times a day  rivaroxaban 20 milliGRAM(s) Oral with dinner  senna 2 Tablet(s) Oral at bedtime  sotalol. 80 milliGRAM(s) Oral every 12 hours    MEDICATIONS  (PRN):  acetaminophen     Tablet .. 650 milliGRAM(s) Oral every 6 hours PRN Temp greater or equal to 38C (100.4F), Mild Pain (1 - 3)  aluminum hydroxide/magnesium hydroxide/simethicone Suspension 30 milliLiter(s) Oral every 4 hours PRN Dyspepsia  melatonin 3 milliGRAM(s) Oral at bedtime PRN Insomnia  ondansetron Injectable 4 milliGRAM(s) IV Push every 8 hours PRN Nausea and/or Vomiting      LABS                          11.4   4.65  )-----------( 266      ( 03 Feb 2024 06:51 )             33.7     02-03    138  |  93<L>  |  17  ----------------------------<  97  3.8   |  34<H>  |  0.7    Ca    9.1      03 Feb 2024 06:51  Phos  2.9     02-03  Mg     1.8     02-03    TPro  6.0  /  Alb  3.8  /  TBili  0.4  /  DBili  x   /  AST  17  /  ALT  26  /  AlkPhos  73  02-02      Urinalysis Basic - ( 03 Feb 2024 06:51 )    Color: x / Appearance: x / SG: x / pH: x  Gluc: 97 mg/dL / Ketone: x  / Bili: x / Urobili: x   Blood: x / Protein: x / Nitrite: x   Leuk Esterase: x / RBC: x / WBC x   Sq Epi: x / Non Sq Epi: x / Bacteria: x        Lactate Trend        CAPILLARY BLOOD GLUCOSE            RADIOLOGY & ADDITIONAL TESTS:    Imaging Personally Reviewed:  [ ] YES  [ ] NO    HEALTH ISSUES - PROBLEM Dx:          PHYSICAL EXAM:  GENERAL: NAD, well-developed.  HEAD:  Atraumatic, Normocephalic.  EYES: EOMI, PERRLA, conjunctiva and sclera clear.  NECK: Supple, No JVD.  CHEST/LUNG: bibasilar rales.   HEART: Irregular rate and rhythm; S1 S2.   ABDOMEN: Soft, Nontender, Nondistended; Bowel sounds present.  EXTREMITIES:  2+ Peripheral Pulses, trace leg edema.   PSYCH: AAOx3.  NEUROLOGY: non-focal.  SKIN: No rashes or lesions.
  GRACIELA GUEVARA  83y  Female      Patient is a 83y old  Female who presents with a chief complaint of Hypotension     (05 Feb 2024 11:35)      INTERVAL HPI/OVERNIGHT EVENTS:  She feels ok, no fever, urinary dysuria improved.   Vital Signs Last 24 Hrs  T(C): 37 (05 Feb 2024 13:11), Max: 37 (05 Feb 2024 13:11)  T(F): 98.6 (05 Feb 2024 13:11), Max: 98.6 (05 Feb 2024 13:11)  HR: 111 (05 Feb 2024 13:11) (72 - 112)  BP: 94/50 (05 Feb 2024 13:11) (89/53 - 118/68)  BP(mean): 65 (05 Feb 2024 13:11) (65 - 65)  RR: 18 (05 Feb 2024 12:30) (18 - 18)  SpO2: 96% (04 Feb 2024 20:08) (96% - 96%)    Parameters below as of 04 Feb 2024 20:08  Patient On (Oxygen Delivery Method): room air          02-04-24 @ 07:01  -  02-05-24 @ 07:00  --------------------------------------------------------  IN: 765 mL / OUT: 1750 mL / NET: -985 mL    02-05-24 @ 07:01  - 02-05-24 @ 16:58  --------------------------------------------------------  IN: 600 mL / OUT: 200 mL / NET: 400 mL            Consultant(s) Notes Reviewed:  [x ] YES  [ ] NO          MEDICATIONS  (STANDING):  diltiazem    Tablet 30 milliGRAM(s) Oral every 6 hours  fluticasone propionate 50 MICROgram(s)/spray Nasal Spray 1 Spray(s) Both Nostrils two times a day  nitrofurantoin monohydrate/macrocrystals (MACROBID) 100 milliGRAM(s) Oral two times a day  pantoprazole    Tablet 40 milliGRAM(s) Oral before breakfast  polyethylene glycol 3350 17 Gram(s) Oral two times a day  rivaroxaban 20 milliGRAM(s) Oral with dinner  senna 2 Tablet(s) Oral at bedtime  sotalol. 80 milliGRAM(s) Oral every 12 hours  vitamin A &amp; D Ointment 1 Application(s) Topical two times a day    MEDICATIONS  (PRN):  acetaminophen     Tablet .. 650 milliGRAM(s) Oral every 6 hours PRN Temp greater or equal to 38C (100.4F), Mild Pain (1 - 3)  aluminum hydroxide/magnesium hydroxide/simethicone Suspension 30 milliLiter(s) Oral every 4 hours PRN Dyspepsia  artificial  tears Solution 1 Drop(s) Both EYES three times a day PRN Dry Eyes  melatonin 3 milliGRAM(s) Oral at bedtime PRN Insomnia  ondansetron Injectable 4 milliGRAM(s) IV Push every 8 hours PRN Nausea and/or Vomiting      LABS                  Lactate Trend        CAPILLARY BLOOD GLUCOSE          Culture - Urine (collected 02-02-24 @ 20:18)  Source: Clean Catch Clean Catch (Midstream)  Final Report (02-05-24 @ 11:58):    >100,000 CFU/ml Escherichia coli    <10,000 CFU/ml Normal Urogenital billy present  Organism: Escherichia coli (02-05-24 @ 11:58)  Organism: Escherichia coli (02-05-24 @ 11:58)      Method Type: DAISY      -  Amoxicillin/Clavulanic Acid: S <=8/4      -  Ampicillin: S <=8 These ampicillin results predict results for amoxicillin      -  Ampicillin/Sulbactam: S <=4/2      -  Aztreonam: S <=4      -  Cefazolin: S <=2 For uncomplicated UTI with K. pneumoniae, E. coli, or P. mirablis: DAISY <=16 is sensitive and DAISY >=32 is resistant. This also predicts results for oral agents cefaclor, cefdinir, cefpodoxime, cefprozil, cefuroxime axetil, cephalexin and locarbef for uncomplicated UTI. Note that some isolates may be susceptible to these agents while testing resistant to cefazolin.      -  Cefepime: S <=2      -  Cefoxitin: S <=8      -  Ceftriaxone: S <=1      -  Cefuroxime: S <=4      -  Ciprofloxacin: S <=0.25      -  Ertapenem: S <=0.5      -  Gentamicin: S <=2      -  Imipenem: S <=1      -  Levofloxacin: S <=0.5      -  Meropenem: S <=1      -  Nitrofurantoin: S <=32 Should not be used to treat pyelonephritis      -  Piperacillin/Tazobactam: S <=8      -  Tobramycin: S <=2      -  Trimethoprim/Sulfamethoxazole: S <=0.5/9.5        RADIOLOGY & ADDITIONAL TESTS:    Imaging Personally Reviewed:  [ ] YES  [ ] NO    HEALTH ISSUES - PROBLEM Dx:          PHYSICAL EXAM:  GENERAL: NAD, well-developed.  HEAD:  Atraumatic, Normocephalic.  EYES: EOMI, PERRLA, conjunctiva and sclera clear.  NECK: Supple, No JVD.  CHEST/LUNG: improved bibasilar rales.   HEART: Irregular rate and rhythm; S1 S2.   ABDOMEN: Soft, Nontender, Nondistended; Bowel sounds present.  EXTREMITIES:  2+ Peripheral Pulses, trace leg edema.   PSYCH: AAOx3.  NEUROLOGY: non-focal.  SKIN: No rashes or lesions.
  ZAKICRISTELGRACIELA MELENDEZ  83y  Female      Patient is a 83y old  Female who presents with a chief complaint of     INTERVAL HPI/OVERNIGHT EVENTS:  No fever, no chest pain  Vital Signs Last 24 Hrs  T(C): 36.6 (04 Feb 2024 12:10), Max: 36.6 (04 Feb 2024 12:10)  T(F): 97.8 (04 Feb 2024 12:10), Max: 97.8 (04 Feb 2024 12:10)  HR: 82 (04 Feb 2024 12:10) (82 - 108)  BP: 130/61 (04 Feb 2024 12:10) (122/74 - 130/61)  BP(mean): --  RR: 18 (03 Feb 2024 20:18) (18 - 18)  SpO2: 96% (04 Feb 2024 06:06) (96% - 99%)    Parameters below as of 04 Feb 2024 06:06  Patient On (Oxygen Delivery Method): room air          02-03-24 @ 07:01  -  02-04-24 @ 07:00  --------------------------------------------------------  IN: 1504 mL / OUT: 1166 mL / NET: 338 mL    02-04-24 @ 07:01  -  02-04-24 @ 13:44  --------------------------------------------------------  IN: 0 mL / OUT: 400 mL / NET: -400 mL            Consultant(s) Notes Reviewed:  [x ] YES  [ ] NO          MEDICATIONS  (STANDING):  diltiazem    Tablet 30 milliGRAM(s) Oral every 6 hours  furosemide   Injectable 20 milliGRAM(s) IV Push daily  nitrofurantoin monohydrate/macrocrystals (MACROBID) 100 milliGRAM(s) Oral two times a day  pantoprazole    Tablet 40 milliGRAM(s) Oral before breakfast  polyethylene glycol 3350 17 Gram(s) Oral two times a day  rivaroxaban 20 milliGRAM(s) Oral with dinner  senna 2 Tablet(s) Oral at bedtime  sotalol. 80 milliGRAM(s) Oral every 12 hours    MEDICATIONS  (PRN):  acetaminophen     Tablet .. 650 milliGRAM(s) Oral every 6 hours PRN Temp greater or equal to 38C (100.4F), Mild Pain (1 - 3)  aluminum hydroxide/magnesium hydroxide/simethicone Suspension 30 milliLiter(s) Oral every 4 hours PRN Dyspepsia  melatonin 3 milliGRAM(s) Oral at bedtime PRN Insomnia  ondansetron Injectable 4 milliGRAM(s) IV Push every 8 hours PRN Nausea and/or Vomiting      LABS                          11.4   4.65  )-----------( 266      ( 03 Feb 2024 06:51 )             33.7     02-03    138  |  93<L>  |  17  ----------------------------<  97  3.8   |  34<H>  |  0.7    Ca    9.1      03 Feb 2024 06:51  Phos  2.9     02-03  Mg     1.8     02-03    TPro  6.0  /  Alb  3.8  /  TBili  0.4  /  DBili  x   /  AST  17  /  ALT  26  /  AlkPhos  73  02-02      Urinalysis Basic - ( 03 Feb 2024 06:51 )    Color: x / Appearance: x / SG: x / pH: x  Gluc: 97 mg/dL / Ketone: x  / Bili: x / Urobili: x   Blood: x / Protein: x / Nitrite: x   Leuk Esterase: x / RBC: x / WBC x   Sq Epi: x / Non Sq Epi: x / Bacteria: x        Lactate Trend        CAPILLARY BLOOD GLUCOSE          Culture - Urine (collected 02-02-24 @ 20:18)  Source: Clean Catch Clean Catch (Midstream)  Preliminary Report (02-03-24 @ 23:37):    >100,000 CFU/ml Escherichia coli        RADIOLOGY & ADDITIONAL TESTS:    Imaging Personally Reviewed:  [ ] YES  [ ] NO    HEALTH ISSUES - PROBLEM Dx:          PHYSICAL EXAM:  GENERAL: NAD, well-developed.  HEAD:  Atraumatic, Normocephalic.  EYES: EOMI, PERRLA, conjunctiva and sclera clear.  NECK: Supple, No JVD.  CHEST/LUNG: bibasilar rales.   HEART: Irregular rate and rhythm; S1 S2.   ABDOMEN: Soft, Nontender, Nondistended; Bowel sounds present.  EXTREMITIES:  2+ Peripheral Pulses, trace leg edema.   PSYCH: AAOx3.  NEUROLOGY: non-focal.  SKIN: No rashes or lesions.
  ZAKIKIRBYGRACIELA BRADLEY  83y, Female  Allergy: sulfonamides (Unknown)  penicillin V potassium (Unknown)  cephalexin (Unknown)  Cipro (Sedation/Somnol)  amoxicillin (Other (Mild to Mod))  naproxen (Unknown)    Hospital Day: 5d    Patient seen and examined earlier today. Feeling well no complaints , pending MIGUEL A CV today.     PMH/PSH:  PAST MEDICAL & SURGICAL HISTORY:  Atrial fibrillation      HTN (hypertension)      History of temporal arteritis      History of temporal artery biopsy      Osteoporosis      H/O temporal arteritis      S/P cataract surgery          LAST 24-Hr EVENTS:    VITALS:  T(F): 97.8 (02-07-24 @ 14:25), Max: 97.9 (02-07-24 @ 11:35)  HR: 114 (02-07-24 @ 14:25)  BP: 104/55 (02-07-24 @ 14:25) (98/54 - 110/56)  RR: 18 (02-07-24 @ 14:15)  SpO2: 95% (02-07-24 @ 14:15)        TESTS & MEASUREMENTS:  Weight (Kg): 63.5 (02-07-24 @ 14:15)  BMI (kg/m2): 27.3 (02-07)    02-05-24 @ 07:01  -  02-06-24 @ 07:00  --------------------------------------------------------  IN: 1060 mL / OUT: 1970 mL / NET: -910 mL    02-06-24 @ 07:01  -  02-07-24 @ 07:00  --------------------------------------------------------  IN: 0 mL / OUT: 1100 mL / NET: -1100 mL    02-07-24 @ 07:01  -  02-07-24 @ 17:12  --------------------------------------------------------  IN: 0 mL / OUT: 700 mL / NET: -700 mL                        Culture - Urine (collected 02-02-24 @ 20:18)  Source: Clean Catch Clean Catch (Midstream)  Final Report (02-05-24 @ 11:58):    >100,000 CFU/ml Escherichia coli    <10,000 CFU/ml Normal Urogenital billy present  Organism: Escherichia coli (02-05-24 @ 11:58)  Organism: Escherichia coli (02-05-24 @ 11:58)      Method Type: DAISY      -  Amoxicillin/Clavulanic Acid: S <=8/4      -  Ampicillin: S <=8 These ampicillin results predict results for amoxicillin      -  Ampicillin/Sulbactam: S <=4/2      -  Aztreonam: S <=4      -  Cefazolin: S <=2 For uncomplicated UTI with K. pneumoniae, E. coli, or P. mirablis: DAISY <=16 is sensitive and DAISY >=32 is resistant. This also predicts results for oral agents cefaclor, cefdinir, cefpodoxime, cefprozil, cefuroxime axetil, cephalexin and locarbef for uncomplicated UTI. Note that some isolates may be susceptible to these agents while testing resistant to cefazolin.      -  Cefepime: S <=2      -  Cefoxitin: S <=8      -  Ceftriaxone: S <=1      -  Cefuroxime: S <=4      -  Ciprofloxacin: S <=0.25      -  Ertapenem: S <=0.5      -  Gentamicin: S <=2      -  Imipenem: S <=1      -  Levofloxacin: S <=0.5      -  Meropenem: S <=1      -  Nitrofurantoin: S <=32 Should not be used to treat pyelonephritis      -  Piperacillin/Tazobactam: S <=8      -  Tobramycin: S <=2      -  Trimethoprim/Sulfamethoxazole: S <=0.5/9.5                    RADIOLOGY, ECG, & ADDITIONAL TESTS:  12 Lead ECG:   Ventricular Rate 94 BPM    Atrial Rate 156 BPM    QRS Duration 92 ms    Q-T Interval 410 ms    QTC Calculation(Bazett) 512 ms    R Axis 29 degrees    T Axis -72 degrees    Diagnosis Line Atrial fibrillation with premature ventricular or aberrantly conducted  complexes  Nonspecific T wave abnormality  Prolonged QT  Abnormal ECG    Confirmed by alyssa gallagher ([id number]) on 2/7/2024 9:07:25 AM (02-07-24 @ 07:30)      RECENT DIAGNOSTIC ORDERS:  12 Lead ECG:   Provider's Contact #: [phone] (02-07-24 @ 16:03)  12 Lead ECG (02-07-24 @ 15:59)  Comprehensive Metabolic Panel: AM Sched. Collection: 08-Feb-2024 04:30 (02-07-24 @ 15:58)  Magnesium: AM Sched. Collection: 08-Feb-2024 04:30 (02-07-24 @ 15:47)  Complete Blood Count + Automated Diff: AM Sched. Collection: 08-Feb-2024 04:30 (02-07-24 @ 15:47)  Transesophageal Echocardiogram:   Transport: Portable  Monitor: w/ Monitor  Provider's Contact #: [phone] (02-07-24 @ 14:51)      MEDICATIONS:  MEDICATIONS  (STANDING):  diltiazem    Tablet 30 milliGRAM(s) Oral every 6 hours  fluticasone propionate 50 MICROgram(s)/spray Nasal Spray 1 Spray(s) Both Nostrils two times a day  furosemide    Tablet 20 milliGRAM(s) Oral daily  nitrofurantoin monohydrate/macrocrystals (MACROBID) 100 milliGRAM(s) Oral two times a day  pantoprazole    Tablet 40 milliGRAM(s) Oral before breakfast  polyethylene glycol 3350 17 Gram(s) Oral two times a day  rivaroxaban 20 milliGRAM(s) Oral with dinner  senna 2 Tablet(s) Oral at bedtime  sotalol. 80 milliGRAM(s) Oral every 12 hours  vitamin A &amp; D Ointment 1 Application(s) Topical two times a day    MEDICATIONS  (PRN):  acetaminophen     Tablet .. 650 milliGRAM(s) Oral every 6 hours PRN Temp greater or equal to 38C (100.4F), Mild Pain (1 - 3)  aluminum hydroxide/magnesium hydroxide/simethicone Suspension 30 milliLiter(s) Oral every 4 hours PRN Dyspepsia  artificial  tears Solution 1 Drop(s) Both EYES three times a day PRN Dry Eyes  melatonin 3 milliGRAM(s) Oral at bedtime PRN Insomnia  ondansetron Injectable 4 milliGRAM(s) IV Push every 8 hours PRN Nausea and/or Vomiting  sodium chloride 0.65% Nasal 1 Spray(s) Both Nostrils two times a day PRN Nasal Congestion      HOME MEDICATIONS:  Actemra 162 mg/0.9 mL subcutaneous solution (02-02)  Lasix 20 mg oral tablet (02-02)  Prolia 60 mg/mL subcutaneous solution (02-02)  sotalol 80 mg oral tablet (02-02)  Xarelto 20 mg oral tablet (02-02)      PHYSICAL EXAM:  GENERAL: NAD, well-developed.  HEAD:  Atraumatic, Normocephalic.  EYES: EOMI, PERRLA, conjunctiva and sclera clear.  NECK: Supple, No JVD.  CHEST/LUNG: improved bibasilar rales.   HEART: Irregular rate and rhythm; S1 S2.   ABDOMEN: Soft, Nontender, Nondistended; Bowel sounds present.  EXTREMITIES:  2+ Peripheral Pulses, trace leg edema.   PSYCH: AAOx3.  NEUROLOGY: non-focal.  SKIN: No rashes or lesions.
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
## 2024-02-08 ENCOUNTER — TRANSCRIPTION ENCOUNTER (OUTPATIENT)
Age: 84
End: 2024-02-08

## 2024-02-08 VITALS — TEMPERATURE: 97 F | HEART RATE: 80 BPM | SYSTOLIC BLOOD PRESSURE: 110 MMHG | DIASTOLIC BLOOD PRESSURE: 58 MMHG

## 2024-02-08 LAB
ALBUMIN SERPL ELPH-MCNC: 3.8 G/DL — SIGNIFICANT CHANGE UP (ref 3.5–5.2)
ALP SERPL-CCNC: 74 U/L — SIGNIFICANT CHANGE UP (ref 30–115)
ALT FLD-CCNC: 16 U/L — SIGNIFICANT CHANGE UP (ref 0–41)
ANION GAP SERPL CALC-SCNC: 9 MMOL/L — SIGNIFICANT CHANGE UP (ref 7–14)
AST SERPL-CCNC: 14 U/L — SIGNIFICANT CHANGE UP (ref 0–41)
BASOPHILS # BLD AUTO: 0.03 K/UL — SIGNIFICANT CHANGE UP (ref 0–0.2)
BASOPHILS NFR BLD AUTO: 0.5 % — SIGNIFICANT CHANGE UP (ref 0–1)
BILIRUB SERPL-MCNC: 0.4 MG/DL — SIGNIFICANT CHANGE UP (ref 0.2–1.2)
BUN SERPL-MCNC: 13 MG/DL — SIGNIFICANT CHANGE UP (ref 10–20)
CALCIUM SERPL-MCNC: 8.8 MG/DL — SIGNIFICANT CHANGE UP (ref 8.4–10.4)
CHLORIDE SERPL-SCNC: 92 MMOL/L — LOW (ref 98–110)
CO2 SERPL-SCNC: 30 MMOL/L — SIGNIFICANT CHANGE UP (ref 17–32)
CREAT SERPL-MCNC: 0.5 MG/DL — LOW (ref 0.7–1.5)
EGFR: 94 ML/MIN/1.73M2 — SIGNIFICANT CHANGE UP
EOSINOPHIL # BLD AUTO: 0.23 K/UL — SIGNIFICANT CHANGE UP (ref 0–0.7)
EOSINOPHIL NFR BLD AUTO: 4.2 % — SIGNIFICANT CHANGE UP (ref 0–8)
GLUCOSE SERPL-MCNC: 104 MG/DL — HIGH (ref 70–99)
HCT VFR BLD CALC: 35.2 % — LOW (ref 37–47)
HGB BLD-MCNC: 11.8 G/DL — LOW (ref 12–16)
IMM GRANULOCYTES NFR BLD AUTO: 0.4 % — HIGH (ref 0.1–0.3)
LYMPHOCYTES # BLD AUTO: 0.62 K/UL — LOW (ref 1.2–3.4)
LYMPHOCYTES # BLD AUTO: 11.2 % — LOW (ref 20.5–51.1)
MAGNESIUM SERPL-MCNC: 2 MG/DL — SIGNIFICANT CHANGE UP (ref 1.8–2.4)
MCHC RBC-ENTMCNC: 32.2 PG — HIGH (ref 27–31)
MCHC RBC-ENTMCNC: 33.5 G/DL — SIGNIFICANT CHANGE UP (ref 32–37)
MCV RBC AUTO: 96.2 FL — SIGNIFICANT CHANGE UP (ref 81–99)
MONOCYTES # BLD AUTO: 0.47 K/UL — SIGNIFICANT CHANGE UP (ref 0.1–0.6)
MONOCYTES NFR BLD AUTO: 8.5 % — SIGNIFICANT CHANGE UP (ref 1.7–9.3)
NEUTROPHILS # BLD AUTO: 4.17 K/UL — SIGNIFICANT CHANGE UP (ref 1.4–6.5)
NEUTROPHILS NFR BLD AUTO: 75.2 % — SIGNIFICANT CHANGE UP (ref 42.2–75.2)
NRBC # BLD: 0 /100 WBCS — SIGNIFICANT CHANGE UP (ref 0–0)
PLATELET # BLD AUTO: 324 K/UL — SIGNIFICANT CHANGE UP (ref 130–400)
PMV BLD: 8.3 FL — SIGNIFICANT CHANGE UP (ref 7.4–10.4)
POTASSIUM SERPL-MCNC: 4 MMOL/L — SIGNIFICANT CHANGE UP (ref 3.5–5)
POTASSIUM SERPL-SCNC: 4 MMOL/L — SIGNIFICANT CHANGE UP (ref 3.5–5)
PROT SERPL-MCNC: 6.3 G/DL — SIGNIFICANT CHANGE UP (ref 6–8)
RBC # BLD: 3.66 M/UL — LOW (ref 4.2–5.4)
RBC # FLD: 12.9 % — SIGNIFICANT CHANGE UP (ref 11.5–14.5)
SODIUM SERPL-SCNC: 131 MMOL/L — LOW (ref 135–146)
WBC # BLD: 5.54 K/UL — SIGNIFICANT CHANGE UP (ref 4.8–10.8)
WBC # FLD AUTO: 5.54 K/UL — SIGNIFICANT CHANGE UP (ref 4.8–10.8)

## 2024-02-08 PROCEDURE — 99239 HOSP IP/OBS DSCHRG MGMT >30: CPT

## 2024-02-08 PROCEDURE — 93010 ELECTROCARDIOGRAM REPORT: CPT

## 2024-02-08 RX ORDER — DILTIAZEM HCL 120 MG
1 CAPSULE, EXT RELEASE 24 HR ORAL
Qty: 60 | Refills: 0
Start: 2024-02-08 | End: 2024-04-07

## 2024-02-08 RX ORDER — PANTOPRAZOLE SODIUM 20 MG/1
1 TABLET, DELAYED RELEASE ORAL
Qty: 0 | Refills: 0 | DISCHARGE
Start: 2024-02-08

## 2024-02-08 RX ORDER — FUROSEMIDE 40 MG
1 TABLET ORAL
Refills: 0 | DISCHARGE

## 2024-02-08 RX ORDER — FUROSEMIDE 40 MG
1 TABLET ORAL
Qty: 0 | Refills: 0 | DISCHARGE
Start: 2024-02-08

## 2024-02-08 RX ORDER — NITROFURANTOIN MACROCRYSTAL 50 MG
1 CAPSULE ORAL
Qty: 6 | Refills: 0
Start: 2024-02-08 | End: 2024-02-10

## 2024-02-08 RX ORDER — POLYETHYLENE GLYCOL 3350 17 G/17G
17 POWDER, FOR SOLUTION ORAL
Qty: 0 | Refills: 0 | DISCHARGE
Start: 2024-02-08

## 2024-02-08 RX ORDER — SOTALOL HCL 120 MG
1 TABLET ORAL
Qty: 0 | Refills: 0 | DISCHARGE
Start: 2024-02-08

## 2024-02-08 RX ORDER — SOTALOL HCL 120 MG
1 TABLET ORAL
Qty: 0 | Refills: 0 | DISCHARGE

## 2024-02-08 RX ADMIN — Medication 20 MILLIGRAM(S): at 06:41

## 2024-02-08 RX ADMIN — Medication 1 SPRAY(S): at 06:45

## 2024-02-08 RX ADMIN — Medication 30 MILLIGRAM(S): at 06:41

## 2024-02-08 RX ADMIN — Medication 100 MILLIGRAM(S): at 06:41

## 2024-02-08 RX ADMIN — Medication 1 APPLICATION(S): at 06:42

## 2024-02-08 RX ADMIN — Medication 30 MILLIGRAM(S): at 11:20

## 2024-02-08 RX ADMIN — Medication 80 MILLIGRAM(S): at 06:41

## 2024-02-08 RX ADMIN — POLYETHYLENE GLYCOL 3350 17 GRAM(S): 17 POWDER, FOR SOLUTION ORAL at 06:42

## 2024-02-08 RX ADMIN — PANTOPRAZOLE SODIUM 40 MILLIGRAM(S): 20 TABLET, DELAYED RELEASE ORAL at 06:41

## 2024-02-08 NOTE — DISCHARGE NOTE PROVIDER - CARE PROVIDER_API CALL
Shorty Santana  Internal Medicine  [address] [place] [address]  Phone: [phone]  Fax: [phone]  Follow Up Time: 1 week   Shorty Santana  Internal Medicine  [address] [place] [address]  Phone: [phone]  Fax: [phone]  Follow Up Time: 1 week    Nik Pedraza  Cardiology  [address] [facility], [address]  [place] [address]  Phone: [phone]  Fax: [phone]  Follow Up Time: 1 week    Theresa Mejia  Internal Medicine  [address] [place] [address]  Phone: [phone]  Fax: [phone]  Follow Up Time: 1-3 days

## 2024-02-08 NOTE — DISCHARGE NOTE PROVIDER - NSDCMRMEDTOKEN_GEN_ALL_CORE_FT
Actemra 162 mg/0.9 mL subcutaneous solution: subcutaneous every 3 weeks  Cardizem  mg/24 hours oral capsule, extended release: 1 cap(s) orally once a day  dilTIAZem 300 mg/24 hours oral capsule, extended release: 1 cap(s) orally once a day  furosemide 20 mg oral tablet: 1 tab(s) orally once a day  pantoprazole 40 mg oral delayed release tablet: 1 tab(s) orally once a day (before a meal)  polyethylene glycol 3350 oral powder for reconstitution: 17 gram(s) orally 2 times a day  Prolia 60 mg/mL subcutaneous solution: subcutaneous every 6 months  senna leaf extract oral tablet: 2 tab(s) orally once a day (at bedtime)  sotalol 80 mg oral tablet: 1 tab(s) orally every 12 hours  Xarelto 20 mg oral tablet: 1 tab(s) orally once a day (in the evening)   Actemra 162 mg/0.9 mL subcutaneous solution: subcutaneous every 3 weeks  Cardizem  mg/24 hours oral capsule, extended release: 1 cap(s) orally once a day  furosemide 20 mg oral tablet: 1 tab(s) orally once a day  pantoprazole 40 mg oral delayed release tablet: 1 tab(s) orally once a day (before a meal)  polyethylene glycol 3350 oral powder for reconstitution: 17 gram(s) orally 2 times a day  Prolia 60 mg/mL subcutaneous solution: subcutaneous every 6 months  senna leaf extract oral tablet: 2 tab(s) orally once a day (at bedtime)  sotalol 80 mg oral tablet: 1 tab(s) orally every 12 hours  Xarelto 20 mg oral tablet: 1 tab(s) orally once a day (in the evening)   Actemra 162 mg/0.9 mL subcutaneous solution: subcutaneous every 3 weeks  Cardizem  mg/24 hours oral capsule, extended release: 1 cap(s) orally once a day  furosemide 20 mg oral tablet: 1 tab(s) orally once a day  nitrofurantoin macrocrystals-monohydrate 100 mg oral capsule: 1 cap(s) orally 2 times a day  pantoprazole 40 mg oral delayed release tablet: 1 tab(s) orally once a day (before a meal)  polyethylene glycol 3350 oral powder for reconstitution: 17 gram(s) orally 2 times a day  Prolia 60 mg/mL subcutaneous solution: subcutaneous every 6 months  senna leaf extract oral tablet: 2 tab(s) orally once a day (at bedtime)  sotalol 80 mg oral tablet: 1 tab(s) orally every 12 hours  Xarelto 20 mg oral tablet: 1 tab(s) orally once a day (in the evening)   Actemra 162 mg/0.9 mL subcutaneous solution: subcutaneous every 3 weeks  Cardizem  mg/24 hours oral capsule, extended release: 1 cap(s) orally once a day  furosemide 20 mg oral tablet: 1 tab(s) orally once a day Also called lasix (water pill)  nitrofurantoin macrocrystals-monohydrate 100 mg oral capsule: 1 cap(s) orally 2 times a day For UTI, stop after two days  pantoprazole 40 mg oral delayed release tablet: 1 tab(s) orally once a day (before a meal) Protonix  polyethylene glycol 3350 oral powder for reconstitution: 17 gram(s) orally 2 times a day  Prolia 60 mg/mL subcutaneous solution: subcutaneous every 6 months  senna leaf extract oral tablet: 2 tab(s) orally once a day (at bedtime)  sotalol 80 mg oral tablet: 1 tab(s) orally every 12 hours  Xarelto 20 mg oral tablet: 1 tab(s) orally once a day (in the evening)

## 2024-02-08 NOTE — DISCHARGE NOTE PROVIDER - NSDCACTIVITY_GEN_ALL_CORE
Do not drive or operate machinery/Do not make important decisions/No heavy lifting/straining No heavy lifting/straining

## 2024-02-08 NOTE — DISCHARGE NOTE PROVIDER - PROVIDER TOKENS
PROVIDER:[TOKEN:[[id number]],FOLLOWUP:[1 week]] PROVIDER:[TOKEN:[[id number]],FOLLOWUP:[1 week]],PROVIDER:[TOKEN:[[id number]],FOLLOWUP:[1 week]],PROVIDER:[TOKEN:[[id number]],FOLLOWUP:[1-3 days]]

## 2024-02-08 NOTE — DISCHARGE NOTE PROVIDER - NSDCFUSCHEDAPPT_GEN_ALL_CORE_FT
Justice Ledbetter  [facility] Physician [facility]  CARDIOLOGY [address] [facility]  Scheduled Appointment: 02/15/2024

## 2024-02-08 NOTE — DISCHARGE NOTE PROVIDER - CARE PROVIDERS DIRECT ADDRESSES
[email or web address]

## 2024-02-08 NOTE — DISCHARGE NOTE PROVIDER - HOSPITAL COURSE
83 year old female with history of AFib on Xarelto s/p ablation 1/10/24, mild pulmonary HTN, severe MR 2/2 prolapse, Temporal arteritis, Osteoporosis came to ED for hypotension and dizziness, she was found with low BP by visiting nurse, patient was recently admitted for fecal impaction, her medication were adjusted for high rate AFIB, also she had recent symptoms for UTI 2 days ago and was prescribed antibiotics but didn't take. In the ED BP 73/37, UA is positive.     Patient admitted for dizziness, multifactorial (UTI, afib, hypotension due to over-medications)  Treated for uti  Adjusted BP meds  Cardioverted  Planned for mitraclip as OP    #Hypotension:   Improved, likely from BP medication, worsened with UTI.   BP was 73/53 in the ED, also was low at home. BP improved  Holding Losartan, Cardizem dose reduced from 360 to 120mg daily.     #UTI:   Patient with dysuria, UA was positive. Urine cx grew E.Coli, she has allergy to multiple antibiotics.   continue Macrobid     #Chronic Atrial Fibrillation  s/p Recent ablation 1/10/24.   Resume Cardizem with lower dose 30mg q 6hrs.   Continue Sotalol and Xarelto.   EP and cardiology follow up, plan for MIGUEL A/Cardioversion today.     #Mild acute HFpEF:   #Severe Mitral Regurgitation   Patient with mild leg edema  CXR showed mild pulmonary congestion  s/p Lasix IV 20mg, continue Lasix 20mg po daily.   Recently evaluated by Dr. Ledbetter outpatient, follow up for Mitral Clip.     #Constipation  Continue miralax bid senna 2qhs    #Temporal arteritis - stable, ESR 41  #Osteoporosis - stable,    83 year old female with history of AFib on Xarelto s/p ablation 1/10/24, mild pulmonary HTN, severe MR 2/2 prolapse, Temporal arteritis, Osteoporosis came to ED for hypotension and dizziness, she was found with low BP by visiting nurse, patient was recently admitted for fecal impaction, her medication were adjusted for high rate AFIB, also she had recent symptoms for UTI 2 days ago and was prescribed antibiotics but didn't take. In the ED BP 73/37, UA is positive.     Patient admitted for dizziness, multifactorial (UTI, afib, hypotension due to over-medications)  Treated for uti  Adjusted BP meds  Cardioverted  Planned for mitraclip as OP    #Hypotension:   Improved, likely from BP medication, worsened with UTI.   BP was 73/53 in the ED, also was low at home. BP improved  Holding Losartan, Cardizem dose reduced from 360 to 120mg daily.     #UTI:   Patient with dysuria, UA was positive. Urine cx grew E.Coli, she has allergy to multiple antibiotics.   continue Macrobid     #Chronic Atrial Fibrillation  s/p Recent ablation 1/10/24.   Resume Cardizem with lower dose 30mg q 6hrs.   Continue Sotalol and Xarelto.   s/p MIGUEL A/Cardioversion    #Mild acute HFpEF:   #Severe Mitral Regurgitation   Patient with mild leg edema  CXR showed mild pulmonary congestion  s/p Lasix IV 20mg, continue Lasix 20mg po daily.   Recently evaluated by Dr. Ledbetter outpatient, follow up for Mitral Clip.     #Constipation  Continue miralax bid senna 2qhs    #Temporal arteritis - stable, ESR 41  #Osteoporosis - stable,    83 year old female with history of AFib on Xarelto s/p ablation 1/10/24, mild pulmonary HTN, severe MR 2/2 prolapse, Temporal arteritis, Osteoporosis came to ED for hypotension and dizziness, she was found with low BP by visiting nurse, patient was recently admitted for fecal impaction, her medication were adjusted for high rate AFIB, also she had recent symptoms for UTI 2 days ago and was prescribed antibiotics but didn't take. In the ED BP 73/37, UA is positive.     Patient admitted for dizziness, multifactorial (UTI, afib, hypotension due to over-medications)  Treated for uti  Adjusted BP meds  Cardioverted  Planned for mitraclip as OP    #Hypotension:   Improved, likely from BP medication, worsened with UTI.   BP was 73/53 in the ED, also was low at home. BP improved  Holding Losartan, Cardizem dose reduced from 360 to 120mg daily.     #UTI:   Patient with dysuria, UA was positive. Urine cx grew E.Coli, she has allergy to multiple antibiotics.   continue Macrobid     #Chronic Atrial Fibrillation  s/p Recent ablation 1/10/24.   Resume Cardizem with lower dose 30mg q 6hrs.   Continue Sotalol and Xarelto.   s/p MIGUEL A/Cardioversion    #Mild acute HFpEF:   #Severe Mitral Regurgitation   Patient with mild leg edema  CXR showed mild pulmonary congestion  s/p Lasix IV 20mg, continue Lasix 20mg po daily.   Recently evaluated by Dr. Ledbetter outpatient, follow up for Mitral Clip.     #Constipation  Continue miralax bid senna 2qhs    #Temporal arteritis - stable, ESR 41  #Osteoporosis - stable

## 2024-02-08 NOTE — DISCHARGE NOTE PROVIDER - ATTENDING DISCHARGE PHYSICAL EXAMINATION:
GENERAL: NAD, well-developed.  HEAD:  Atraumatic, Normocephalic.  EYES: EOMI, PERRLA, conjunctiva and sclera clear.  NECK: Supple, No JVD.  CHEST/LUNG: improved bibasilar rales.   HEART: regular rate and rhythm; S1 S2.   ABDOMEN: Soft, Nontender, Nondistended; Bowel sounds present.  EXTREMITIES:  2+ Peripheral Pulses, trace leg edema.   PSYCH: AAOx3.  NEUROLOGY: non-focal.  SKIN: No rashes or lesions.

## 2024-02-08 NOTE — DISCHARGE NOTE PROVIDER - NSDCCPCAREPLAN_GEN_ALL_CORE_FT
PRINCIPAL DISCHARGE DIAGNOSIS  Diagnosis: Hypotension  Assessment and Plan of Treatment: You were admitted for dizziness, multifactorial (UTI, afib, hypotension due to over-medications)  Treated for uti  Adjusted your BP meds  You underwent electrical cardioversion   Planned for mitraclip as OP  Please follow up with PCP, cardiology, and EP      SECONDARY DISCHARGE DIAGNOSES  Diagnosis: Generalized weakness  Assessment and Plan of Treatment:      PRINCIPAL DISCHARGE DIAGNOSIS  Diagnosis: Hypotension  Assessment and Plan of Treatment: You were admitted for low BP, multifactorial (UTI, afib, hypotension due to over-medications)  Treated for uti  Adjusted your BP meds  You underwent electrical cardioversion   Planned for mitraclip as OP  Please follow up with PCP, cardiology, and EP

## 2024-02-15 ENCOUNTER — APPOINTMENT (OUTPATIENT)
Dept: CARDIOLOGY | Facility: CLINIC | Age: 84
End: 2024-02-15
Payer: MEDICARE

## 2024-02-15 VITALS
RESPIRATION RATE: 14 BRPM | HEIGHT: 61 IN | OXYGEN SATURATION: 98 % | SYSTOLIC BLOOD PRESSURE: 140 MMHG | DIASTOLIC BLOOD PRESSURE: 83 MMHG | HEART RATE: 72 BPM

## 2024-02-15 VITALS — WEIGHT: 144 LBS | BODY MASS INDEX: 27.21 KG/M2

## 2024-02-15 DIAGNOSIS — I48.21 PERMANENT ATRIAL FIBRILLATION: ICD-10-CM

## 2024-02-15 DIAGNOSIS — K59.00 CONSTIPATION, UNSPECIFIED: ICD-10-CM

## 2024-02-15 DIAGNOSIS — I27.20 PULMONARY HYPERTENSION, UNSPECIFIED: ICD-10-CM

## 2024-02-15 DIAGNOSIS — Z88.2 ALLERGY STATUS TO SULFONAMIDES: ICD-10-CM

## 2024-02-15 DIAGNOSIS — I11.0 HYPERTENSIVE HEART DISEASE WITH HEART FAILURE: ICD-10-CM

## 2024-02-15 DIAGNOSIS — N17.9 ACUTE KIDNEY FAILURE, UNSPECIFIED: ICD-10-CM

## 2024-02-15 DIAGNOSIS — Z88.1 ALLERGY STATUS TO OTHER ANTIBIOTIC AGENTS STATUS: ICD-10-CM

## 2024-02-15 DIAGNOSIS — I08.1 RHEUMATIC DISORDERS OF BOTH MITRAL AND TRICUSPID VALVES: ICD-10-CM

## 2024-02-15 DIAGNOSIS — Z79.01 LONG TERM (CURRENT) USE OF ANTICOAGULANTS: ICD-10-CM

## 2024-02-15 DIAGNOSIS — T50.905A ADVERSE EFFECT OF UNSPECIFIED DRUGS, MEDICAMENTS AND BIOLOGICAL SUBSTANCES, INITIAL ENCOUNTER: ICD-10-CM

## 2024-02-15 DIAGNOSIS — M31.6 OTHER GIANT CELL ARTERITIS: ICD-10-CM

## 2024-02-15 DIAGNOSIS — N39.0 URINARY TRACT INFECTION, SITE NOT SPECIFIED: ICD-10-CM

## 2024-02-15 DIAGNOSIS — B96.20 UNSPECIFIED ESCHERICHIA COLI [E. COLI] AS THE CAUSE OF DISEASES CLASSIFIED ELSEWHERE: ICD-10-CM

## 2024-02-15 DIAGNOSIS — I48.20 CHRONIC ATRIAL FIBRILLATION, UNSPECIFIED: ICD-10-CM

## 2024-02-15 DIAGNOSIS — M81.0 AGE-RELATED OSTEOPOROSIS WITHOUT CURRENT PATHOLOGICAL FRACTURE: ICD-10-CM

## 2024-02-15 DIAGNOSIS — Z88.0 ALLERGY STATUS TO PENICILLIN: ICD-10-CM

## 2024-02-15 DIAGNOSIS — E87.70 FLUID OVERLOAD, UNSPECIFIED: ICD-10-CM

## 2024-02-15 DIAGNOSIS — I95.2 HYPOTENSION DUE TO DRUGS: ICD-10-CM

## 2024-02-15 DIAGNOSIS — N30.00 ACUTE CYSTITIS WITHOUT HEMATURIA: ICD-10-CM

## 2024-02-15 DIAGNOSIS — Z87.891 PERSONAL HISTORY OF NICOTINE DEPENDENCE: ICD-10-CM

## 2024-02-15 DIAGNOSIS — I50.31 ACUTE DIASTOLIC (CONGESTIVE) HEART FAILURE: ICD-10-CM

## 2024-02-15 PROCEDURE — 99214 OFFICE O/P EST MOD 30 MIN: CPT

## 2024-02-15 RX ORDER — DILTIAZEM HYDROCHLORIDE 120 MG/1
120 TABLET, COATED ORAL
Refills: 0 | Status: ACTIVE | COMMUNITY

## 2024-02-16 NOTE — HISTORY OF PRESENT ILLNESS
[FreeTextEntry1] : Ms. GRACIELA GUEVARA 83 year F arrives today for evaluation of their Moderate to Severe Mitral Regurgitation. Patient PMH include Afib X 5 Cardioversion last 8/2023, all failed and failed Ablation 12/2023, HTN, Leukopenia Osteopenia. Symptoms include SOB and LE Edema. NYHA class III. Had further testing and arrives today for next step planning.   Their healthcare team includes the following PMD: Dr. Santana Cardio: Dr. Pedraza  EP: Dr. Lester Hematologist: Dr. Pérez  Denies family history of CAD Lives alone, no aide- has been independent Ambulates with cane

## 2024-02-16 NOTE — ASSESSMENT
[FreeTextEntry1] : MIGUEL A reviewed  Pt needs CATH prior to MitraClip  Today pts HR is regular s/p cardioversion with Dr. Duncan  Currently on Cardizem 120mg with improved BP and Lasix 20mg daily Does not weigh herself daily advised to maintain DW and notify her cardio if she gains 3-5 lbs in one day - continue lasix 20mg daily if gains weight (3lbs) can take lasix 40mg x 1 day until swelling and weight improves   At this time pt does not want to proceed with CATH for 3 months  Recommended CATH and to continue planning for mitraclip - pt declined cath at this time and will F/U CTS for further discussion of CATH and MitaCLip CATH to be with her Cardio - [place]   F/U CTS 6 weeks

## 2024-02-16 NOTE — CARDIOLOGY SUMMARY
[de-identified] : Date of Exam:        2/7/2024 3:04:26 PM Referring Physician: [id number] Justice Ledbetter MD, [facility] Sonographer:         Suzanne Taylor Fellow:              Gisella Aguillon MD Reading Physician:   Ythan Goldberg MD.  Procedure:   Transesophageal Echocardiogram. Indications: I34.0 - Nonrheumatic mitral (valve) insufficiency Diagnosis:   I34.0 - Nonrheumatic mitral (valve) insufficiency     Summary:  1. Left ventricular ejection fraction, by visual estimation, is 50 to 55%.  2. Normal global left ventricular systolic function.  3. Severely enlarged left atrium.  4. Severely enlarged right atrium.  5. No left atrial appendage thrombus and decreased left atrial appendage velocities.  6. Sclerotic aortic valve with normal opening.  7. Trivial aortic regurgitation.  8. Mild thickening of the anterior and posterior mitral valve leaflets.  9. Moderate to severe mitral valve regurgitation. Etiology is atrial-functional. 10. Regurgitant fraction = 53%. Mitral valve area by 3D guided planimetry = 5.5cm2. 11. Mild tricuspid regurgitation.

## 2024-02-16 NOTE — END OF VISIT
[FreeTextEntry3] : Interval hospitalization.  Symptomatic atrial fibrillation, low blood pressure, decompensated diastolic CHF (volume overload).  Diuresed and underwent DCCV.  Feels better since discharge.  Breathing more comfortable.  Getting stronger.  Rhythm controlled on sotalol.  Diltiazem decreased.  Currently taking 20 mg Lasix daily.  Notices more lower extremity edema over the past few days.  Better in morning.  Not checking weight.  BP controlled. Trace lower extremity edema.  ECHO reviewed.  Normal LV function.  Moderate to severe mitral regurgitation (atrial functional, annular dilatation).  MitraClip reasonable given symptomatic mitral regurgitation on medical therapy.  Also suspect chance of maintaining sinus rhythm low with persistent severe mitral regurgitation.  Lauren requires time to "mentally prepare" for procedure and wants to wait a couple months.  Increase Lasix 40 mg daily x 2 days, then resume 20 mg daily.  Take extra dose for 3 pound weight gain. Continue Cardizem, sotalol, Xarelto. Follow-up 6 weeks.

## 2024-02-18 NOTE — END OF VISIT
[FreeTextEntry3] :  Persistent atrial fibrillation Mitral regurgitation Chronic diastolic CHF  Recent AF ablation (several failed cardioversions in the past).  Presented to (by for constipation.  Found to have recurrent atrial fibrillation.  Exertional dyspnea Lightheadedness (likely overmedicated blood pressure) Mild volume overload with lower extremity edema.  ECHO reviewed.  Normal LV function.  Moderate to severe mitral regurgitation.  Mechanism likely atrial functional.  Stop olmesartan and hydrochlorothiazide Start Lasix Continue Cardizem and Xarelto EP follow-up today with Dr. Lester (arranged)  Will likely benefit from MitraClip for both symptomatic mitral regurgitation, and greater likelihood of maintaining sinus rhythm. Plan for MIGUEL A with DCCV to further evaluate.

## 2024-02-18 NOTE — ASSESSMENT
[FreeTextEntry1] : Ms. GRACIELA GUEVARA 83 year F arrives  today for evaluation of their Severe Mitral Regurgitation. Patient PMH include Afib X 5 Cardioversion last 8/2023, all failed and failed Ablation 12/2023, HTN, Leukopenia Osteopenia. Symptoms include SOB and LE Edema. NYHA class III. Here for hospital follow up for her severe mitral regurgitation.  Plan: Reviewed hospital admission and echocardiogram with patient Having episodes of hypotension- Stop Olmesartan Stop HCTZ Will start on Lasix 20 mg daily Patient with moderate to severe MR, reviewed echocardiograms back to 2021 Will get EKG now to see if patient in AFib  Will need another MIGUEL A to evaluate MR C/O of burning- may have UTI will get U/A and CBC/CMP F/U in 2 weeks

## 2024-02-18 NOTE — REVIEW OF SYSTEMS
[Fever] : no fever [Headache] : no headache [Chills] : no chills [Chest Discomfort] : no chest discomfort [Palpitations] : no palpitations [Leg Claudication] : no intermittent leg claudication [Syncope] : no syncope [FreeTextEntry9] : Ambulatory with cane

## 2024-02-18 NOTE — HISTORY OF PRESENT ILLNESS
[FreeTextEntry1] : Ms. GRACIELA GUEVARA 83 year F arrives  today for evaluation of their Severe Mitral Regurgitation. Patient PMH include Afib X 5 Cardioversion last 8/2023, all failed and failed Ablation 12/2023, HTN, Leukopenia Osteopenia. Symptoms include SOB and LE Edema. NYHA class III. Here for hospital follow up for her severe mitral regurgitation.  Their healthcare team includes the following PMD: Dr. Santana  Cardio: Dr. Dumont  EP: Dr. Lester Hemtologist: Dr. Pérez   NYHA class Pt lives home with ?aide Denies family history of CAD Lives alone, no aide- has been independent  Ambulates with cane   6 Minute Walk: Ambulated 450 feet in 3 mint, limited due to mobility and SOB

## 2024-02-20 RX ORDER — FUROSEMIDE 20 MG/1
20 TABLET ORAL
Qty: 30 | Refills: 1 | Status: ACTIVE | COMMUNITY
Start: 2024-02-01 | End: 1900-01-01

## 2024-05-30 ENCOUNTER — APPOINTMENT (OUTPATIENT)
Dept: CARDIOLOGY | Facility: CLINIC | Age: 84
End: 2024-05-30
Payer: MEDICARE

## 2024-05-30 VITALS
SYSTOLIC BLOOD PRESSURE: 122 MMHG | WEIGHT: 136 LBS | RESPIRATION RATE: 11 BRPM | HEART RATE: 66 BPM | BODY MASS INDEX: 25.68 KG/M2 | HEIGHT: 61 IN | OXYGEN SATURATION: 95 % | DIASTOLIC BLOOD PRESSURE: 79 MMHG | TEMPERATURE: 97 F

## 2024-05-30 DIAGNOSIS — I48.19 OTHER PERSISTENT ATRIAL FIBRILLATION: ICD-10-CM

## 2024-05-30 DIAGNOSIS — I50.32 CHRONIC DIASTOLIC (CONGESTIVE) HEART FAILURE: ICD-10-CM

## 2024-05-30 PROCEDURE — 99214 OFFICE O/P EST MOD 30 MIN: CPT

## 2024-05-30 NOTE — CARDIOLOGY SUMMARY
[de-identified] : Date of Exam:        2/7/2024 3:04:26 PM Referring Physician: [id number] Justice Ledbetter MD, [facility] Sonographer:         Suzanne Taylor Fellow:              Gisella Aguillon MD Reading Physician:   Ythan Goldberg MD.  Procedure:   Transesophageal Echocardiogram. Indications: I34.0 - Nonrheumatic mitral (valve) insufficiency Diagnosis:   I34.0 - Nonrheumatic mitral (valve) insufficiency     Summary:  1. Left ventricular ejection fraction, by visual estimation, is 50 to 55%.  2. Normal global left ventricular systolic function.  3. Severely enlarged left atrium.  4. Severely enlarged right atrium.  5. No left atrial appendage thrombus and decreased left atrial appendage velocities.  6. Sclerotic aortic valve with normal opening.  7. Trivial aortic regurgitation.  8. Mild thickening of the anterior and posterior mitral valve leaflets.  9. Moderate to severe mitral valve regurgitation. Etiology is atrial-functional. 10. Regurgitant fraction = 53%. Mitral valve area by 3D guided planimetry = 5.5cm2. 11. Mild tricuspid regurgitation.

## 2024-05-30 NOTE — CARDIOLOGY SUMMARY
[de-identified] : Date of Exam:        2/7/2024 3:04:26 PM Referring Physician: [id number] Justice Ledbetter MD, [facility] Sonographer:         Suzanne Taylor Fellow:              Gisella Aguillon MD Reading Physician:   Ythan Goldberg MD.  Procedure:   Transesophageal Echocardiogram. Indications: I34.0 - Nonrheumatic mitral (valve) insufficiency Diagnosis:   I34.0 - Nonrheumatic mitral (valve) insufficiency     Summary:  1. Left ventricular ejection fraction, by visual estimation, is 50 to 55%.  2. Normal global left ventricular systolic function.  3. Severely enlarged left atrium.  4. Severely enlarged right atrium.  5. No left atrial appendage thrombus and decreased left atrial appendage velocities.  6. Sclerotic aortic valve with normal opening.  7. Trivial aortic regurgitation.  8. Mild thickening of the anterior and posterior mitral valve leaflets.  9. Moderate to severe mitral valve regurgitation. Etiology is atrial-functional. 10. Regurgitant fraction = 53%. Mitral valve area by 3D guided planimetry = 5.5cm2. 11. Mild tricuspid regurgitation.

## 2024-06-03 DIAGNOSIS — I34.0 NONRHEUMATIC MITRAL (VALVE) INSUFFICIENCY: ICD-10-CM

## 2024-06-09 PROBLEM — I50.32 CHRONIC DIASTOLIC CONGESTIVE HEART FAILURE: Status: ACTIVE | Noted: 2024-02-15

## 2024-06-09 PROBLEM — I48.19 PERSISTENT ATRIAL FIBRILLATION: Status: ACTIVE | Noted: 2024-02-15

## 2024-06-09 NOTE — HISTORY OF PRESENT ILLNESS
[FreeTextEntry1] : Ms. GRACIELA GUEVARA 83 year F arrives today for evaluation of their Severe Mitral Regurgitation. Patient PMH include Afib X 5 Cardioversion last 8/2023, all failed and failed Ablation 12/2023, HTN, Temporal Arteritis, Leukopenia Osteopenia. Symptoms include SOB and LE Edema, which have much improved. NYHA class I now after medication. S/p successful cardioversion in Feb 2024.  Here for  follow up for her severe mitral regurgitation.  Their healthcare team includes the following PMD: Dr. Santana Cardio: Dr. Dumont EP: Dr. Lester Hemtologist: Dr. Pérez  NYHA class I Denies family history of CAD Lives alone, no aide- has been independent Ambulates with cane

## 2024-06-09 NOTE — END OF VISIT
[FreeTextEntry3] : Feels well. Remains rhythm controlled. Compensated on medical therapy.  Normotensive. Breathing comfortable and not limiting factor to her daily activities.  Lauren adamantly does not want to proceed with DAMIAN at presnent.  Given age, absence of flail, preserved LV function and dimensions, and compensation without significant symptoms on medical therapy, this is not unreasonable.  We again spoke about the natural history of mitral regurgitation, the need for close follow-up, and the availability of minimally invasive procedures should her symptoms warrant intervention.  Regular follow-up Dr. Jenkins and Trevon. Follow-up [facility] 6 months.

## 2024-06-09 NOTE — END OF VISIT
[FreeTextEntry3] : Feels well. Remains rhythm controlled. Compensated on medical therapy.  Normotensive. Breathing comfortable and not limiting factor to her daily activities.  Lauren adamantly does not want to proceed with DAMIAN at presnent.  Given age, absence of flail, preserved LV function and dimensions, and compensation without significant symptoms on medical therapy, this is not unreasonable.  We again spoke about the natural history of mitral regurgitation, the need for close follow-up, and the availability of minimally invasive procedures should her symptoms warrant intervention.  Regular follow-up Dr. Jenkins and Trevon. Follow-up [facility] 6 months.

## 2024-06-09 NOTE — ASSESSMENT
[FreeTextEntry1] : Ms. GRACIELA GUEVARA 83 year F arrives today for evaluation of their Severe Mitral Regurgitation.  Here for  follow up for her severe mitral regurgitation.  Plan: Reviewed plan and history/symptoms S/P MIGUEL A/ Cardioversion 2/2024 and is currently in SR Feels well on current medication regimen Does not wish to undergo workup or testing for clip at this time Continue Current Medications Echocardiogram with Dr Braun in 6 months F/U after for review F/U sooner if symptoms begin to worsen Continue F/U with Dr Braun for general cardiac management PMD for routine medical care

## 2024-08-28 ENCOUNTER — OUTPATIENT (OUTPATIENT)
Dept: OUTPATIENT SERVICES | Facility: HOSPITAL | Age: 84
LOS: 1 days | End: 2024-08-28
Payer: MEDICARE

## 2024-08-28 ENCOUNTER — RESULT REVIEW (OUTPATIENT)
Age: 84
End: 2024-08-28

## 2024-08-28 DIAGNOSIS — Z98.49 CATARACT EXTRACTION STATUS, UNSPECIFIED EYE: Chronic | ICD-10-CM

## 2024-08-28 DIAGNOSIS — Z12.31 ENCOUNTER FOR SCREENING MAMMOGRAM FOR MALIGNANT NEOPLASM OF BREAST: ICD-10-CM

## 2024-08-28 PROCEDURE — 77067 SCR MAMMO BI INCL CAD: CPT

## 2024-08-28 PROCEDURE — 77063 BREAST TOMOSYNTHESIS BI: CPT

## 2024-08-28 PROCEDURE — 77067 SCR MAMMO BI INCL CAD: CPT | Mod: 26

## 2024-08-28 PROCEDURE — 77063 BREAST TOMOSYNTHESIS BI: CPT | Mod: 26

## 2024-08-29 DIAGNOSIS — Z12.31 ENCOUNTER FOR SCREENING MAMMOGRAM FOR MALIGNANT NEOPLASM OF BREAST: ICD-10-CM

## 2024-11-15 ENCOUNTER — OUTPATIENT (OUTPATIENT)
Dept: OUTPATIENT SERVICES | Facility: HOSPITAL | Age: 84
LOS: 1 days | End: 2024-11-15
Payer: MEDICARE

## 2024-11-15 ENCOUNTER — LABORATORY RESULT (OUTPATIENT)
Age: 84
End: 2024-11-15

## 2024-11-15 ENCOUNTER — APPOINTMENT (OUTPATIENT)
Age: 84
End: 2024-11-15
Payer: MEDICARE

## 2024-11-15 VITALS
HEIGHT: 61 IN | SYSTOLIC BLOOD PRESSURE: 153 MMHG | OXYGEN SATURATION: 99 % | HEART RATE: 80 BPM | DIASTOLIC BLOOD PRESSURE: 84 MMHG | BODY MASS INDEX: 27.19 KG/M2 | TEMPERATURE: 98.1 F | WEIGHT: 144 LBS

## 2024-11-15 DIAGNOSIS — D72.819 DECREASED WHITE BLOOD CELL COUNT, UNSPECIFIED: ICD-10-CM

## 2024-11-15 DIAGNOSIS — Z98.49 CATARACT EXTRACTION STATUS, UNSPECIFIED EYE: Chronic | ICD-10-CM

## 2024-11-15 PROCEDURE — 99213 OFFICE O/P EST LOW 20 MIN: CPT

## 2024-11-15 PROCEDURE — 85027 COMPLETE CBC AUTOMATED: CPT

## 2024-11-16 DIAGNOSIS — D72.819 DECREASED WHITE BLOOD CELL COUNT, UNSPECIFIED: ICD-10-CM

## 2024-11-18 LAB
HCT VFR BLD CALC: 33.8 %
HGB BLD-MCNC: 11.8 G/DL
MCHC RBC-ENTMCNC: 32 PG
MCHC RBC-ENTMCNC: 34.9 G/DL
MCV RBC AUTO: 91.6 FL
PLATELET # BLD AUTO: 210 K/UL
PMV BLD: 9 FL
RBC # BLD: 3.69 M/UL
RBC # FLD: 13 %
WBC # FLD AUTO: 4.29 K/UL

## 2024-12-05 ENCOUNTER — APPOINTMENT (OUTPATIENT)
Dept: CARDIOLOGY | Facility: CLINIC | Age: 84
End: 2024-12-05

## 2024-12-05 VITALS
HEIGHT: 61 IN | DIASTOLIC BLOOD PRESSURE: 80 MMHG | HEART RATE: 72 BPM | SYSTOLIC BLOOD PRESSURE: 122 MMHG | WEIGHT: 144 LBS | RESPIRATION RATE: 14 BRPM | BODY MASS INDEX: 27.19 KG/M2 | OXYGEN SATURATION: 96 %

## 2024-12-05 PROCEDURE — 99214 OFFICE O/P EST MOD 30 MIN: CPT

## 2024-12-05 PROCEDURE — 93000 ELECTROCARDIOGRAM COMPLETE: CPT

## 2025-04-19 NOTE — ED PROVIDER NOTE - NS ED ATTENDING STATEMENT MOD
PE x 2 (2020 and 2023 requiring thrombectomy). Had been on apixiban and then Lovenox due to insurance coverage issues.  Now on coumadin with supratherapeutic INR.    - s/p vit K  - INR now therapeutic  - pharm following for coumadin management.   Problem: Labor  Goal: Patient/family childbirth preparation and goals established  Outcome: Outcome Met, Continue evaluating goal progress toward completion  Reviewed with pt and significant other at this time        I have personally performed a face to face diagnostic evaluation on this patient. I have reviewed the ACP note and agree with the history, exam and plan of care, except as noted.

## 2025-06-09 ENCOUNTER — APPOINTMENT (OUTPATIENT)
Dept: CARDIOLOGY | Facility: CLINIC | Age: 85
End: 2025-06-09

## 2025-09-20 ENCOUNTER — RESULT REVIEW (OUTPATIENT)
Age: 85
End: 2025-09-20